# Patient Record
Sex: FEMALE | Race: WHITE | NOT HISPANIC OR LATINO | Employment: OTHER | URBAN - METROPOLITAN AREA
[De-identification: names, ages, dates, MRNs, and addresses within clinical notes are randomized per-mention and may not be internally consistent; named-entity substitution may affect disease eponyms.]

---

## 2017-03-06 ENCOUNTER — ALLSCRIPTS OFFICE VISIT (OUTPATIENT)
Dept: OTHER | Facility: OTHER | Age: 22
End: 2017-03-06

## 2017-06-15 ENCOUNTER — GENERIC CONVERSION - ENCOUNTER (OUTPATIENT)
Dept: OTHER | Facility: OTHER | Age: 22
End: 2017-06-15

## 2018-01-12 VITALS
DIASTOLIC BLOOD PRESSURE: 62 MMHG | WEIGHT: 195.38 LBS | HEIGHT: 66 IN | SYSTOLIC BLOOD PRESSURE: 118 MMHG | BODY MASS INDEX: 31.4 KG/M2

## 2018-01-12 NOTE — MISCELLANEOUS
Message   Recorded as Task   Date: 06/15/2017 02:29 PM, Created By: Max Shah   Task Name: Follow Up   Assigned To: Lynda Carosn   Regarding Patient: Joby Boyce, Status: Active   Comment:    Paul Barron - 15 Isaac 2017 2:29 PM     TASK CREATED  GOOD EVENING      PT CALLED ERQUESTING A REFILL FOR HER BIRTH CONTROL  HER CONTACT INFO: [facility] - 15 Isaac 2017 2:39 PM     TASK EDITED  pt should have refills    called and left a message for the pharmacy           Active Problems    1  Counseling for birth control, oral contraceptives (V25 01) (Z30 09)   2  Encounter for gynecological examination without abnormal finding (V72 31) (Z01 419)   3  Encounter for screening for respiratory tuberculosis (V74 1) (Z11 1)   4  Mild intermittent asthma without complication (275 27) (Y24 70)    Current Meds   1  Levora 0 15/30 (28) 0 15-30 MG-MCG Oral Tablet; Take one daily; Therapy: 74QNJ1220 to (Last Rx:06Mar2017)  Requested for: 33BQU2536 Ordered   2  Meloxicam 15 MG Oral Tablet; take 1 tablet by mouth once daily; Therapy: 40BSY6075 to (Evaluate:01Jan2017)  Requested for: 01ISY0076; Last   Rx:02Nov2016 Ordered   3  Methylphenidate HCl - 20 MG Oral Tablet; TAKE 1 TABLET DAILY; Therapy: 50LLY2974 to (Evaluate:05Jky4141); Last Rx:02Nov2016 Ordered   4  ProAir  (90 Base) MCG/ACT Inhalation Aerosol Solution; INHALE 2 PUFFS 4   times daily; Therapy: 17Jjr3137 to (Last Rx:39Wka4765)  Requested for: 77Ijf9992 Ordered    Allergies    1   No Known Environmental Allergies    Signatures   Electronically signed by : Yadi Castle, ; Isaac 15 2017  2:39PM EST                       (Author)

## 2018-01-15 NOTE — PROGRESS NOTES
Assessment    1  Well woman exam with routine gynecological exam (V72 31) (Z01 419)   2  Counseling for birth control, oral contraceptives (V25 01) (Z30 9)    Plan  Counseling for birth control, oral contraceptives    · Levora 0 15/30 (28) 0 15-30 MG-MCG Oral Tablet; TAKE 1 TABLET DAILY   Rx By: Steve Aguilar; Dispense: 0 Days ; #:1 Tablet; Refill: 3; For: Counseling for birth control, oral contraceptives; ALTHEA = N; Sent To: Elodia Norris    Discussion/Summary    Contraception - explained ocs and nexplanon, risk of blood clot with ocs reviewed along with side effects of nausea, breast tenderness, BTB, HA and bloating, irregular bleeding with nexplanon reviewed  She will start with her next menses and RTO 3months for pill check, continued condom use recommended, instructed on pill taking, information on pills and nexplanon given  Chief Complaint    1  Visit For: Preventive General Multisystem Exam  No issues      History of Present Illness  HPI: New Pt - 26 year old female presents for first exam, she has regular menses with 2-3 heavy days when she changes a super tampon 4-6 times a day  Cramps are manageable  She is sexually active and is very consistent with condom use  She is interested in contraception, either pills or Nexplanon  She has well controlled asthma and ADD  GYN HM, Adult Female [facility]: The patient is being seen for a health maintenance evaluation  General Health: The patient's health since the last visit is described as good  Lifestyle:  She has weight concerns  She does not use tobacco    Screening: Active Problems    1  Asthma (493 90) (J45 909)   2  Attention and concentration deficit (799 51) (R41 840)   3  Need for influenza vaccination (V04 81) (Z23)   4  Sacroiliitis (720 2) (M46 1)   5   URI (upper respiratory infection) (465 9) (J06 9)    Past Medical History    · History of status asthmaticus (V12 69) (Z87 09)    The active problems and past medical history were reviewed and updated today  Surgical History    · History of Adenoidectomy   · History of Oral Surgery Tooth Extraction   · History of Tonsillectomy    Family History    · Family history of Anxiety    · Family history of malignant neoplasm of breast (V16 3) (Z80 3)   · Family history of thyroid disease (V18 19) (Z83 49)   · Family history of Ovarian cancer    · Family history of Anxiety   · Family history of malignant neoplasm of breast (V16 3) (Z80 3)   · Family history of thyroid disease (V18 19) (Z83 49)   · Family history of Throat cancer    · Family history of Anxiety   · Family history of malignant neoplasm of breast (V16 3) (Z80 3)   · Family history of thyroid disease (V18 19) (Z83 49)   · Family history of Ovarian cancer   · Family history of Throat cancer    Social History    · Always uses seat belt   · Caffeine use (V49 89) (F15 90)   · Condom use   · Never a smoker   · No alcohol use   · Single    Current Meds   1  Methylphenidate HCl - 20 MG Oral Tablet; TAKE 1 TABLET DAILY; Therapy: 36LXI6543 to (Evaluate:04Ipq8229); Last Rx:19Nov2015 Ordered   2  ProAir  (90 Base) MCG/ACT Inhalation Aerosol Solution; INHALE 2 PUFFS 4   times daily; Therapy: 76Pjm1284 to (Last Rx:45Ktr7059)  Requested for: 25Srt4958 Ordered    Allergies    1  No Known Environmental Allergies    Vitals   Recorded: 95MET3566 39:97VM   Systolic 040   Diastolic 70   Height 5 ft 6 5 in   Weight 196 lb    BMI Calculated 31 16   BSA Calculated 1 99   LMP 29-Dec-2015     Physical Exam    Constitutional   General appearance: No acute distress, well appearing and well nourished  Neck   Thyroid: Normal, no thyromegaly  Pulmonary   Auscultation of lungs: Clear to auscultation  Cardiovascular   Auscultation of heart: Normal rate and rhythm, normal S1 and S2, no murmurs  Genitourinary   External genitalia: Normal and no lesions appreciated  Vagina: Normal, no lesions or dryness appreciated      Urethra: Normal     Urethral meatus: Normal     Bladder: Normal, soft, non-tender and no prolapse or masses appreciated  Cervix: Normal, no palpable masses  Uterus: Normal, non-tender, not enlarged, and no palpable masses  Adnexa/parametria: Normal, non-tender and no fullness or masses appreciated  Chest   Breasts: Normal and no dimpling or skin changes noted  Abdomen   Abdomen: Normal, non-tender, and no organomegaly noted  Liver and spleen: No hepatomegaly or splenomegaly  Examination for hernias: No hernias appreciated  Psychiatric   Orientation to person, place, and time: Normal     Mood and affect: Normal        Signatures   Electronically signed by :  Jean Serrano DO; Jan 19 2016  3:21PM EST                       (Author)

## 2018-01-16 NOTE — RESULT NOTES
Verified Results  * XR SPINE THORACIC 3 VIEW [id number] 01:49PM [facility] Order Number: [id number]     Test Name Result Flag Reference   XR SPINE THORACIC 3 VW (Report)     THORACIC SPINE     INDICATION: Right side back pain  COMPARISON: None     VIEWS: AP, lateral and coned-down projections; 3 images     FINDINGS: Bone mineralization within normal limits  Thoracic vertebrae demonstrate normal stature and alignment  There is no fracture or pathologic bone lesion  There is no displacement of the paraspinal line  The pedicles are intact  IMPRESSION:   Unremarkable thoracic spine  Workstation performed: [id number]     Signed by:    John Minaya DO   11/3/16

## 2018-03-16 ENCOUNTER — OFFICE VISIT (OUTPATIENT)
Dept: FAMILY MEDICINE CLINIC | Facility: HOSPITAL | Age: 23
End: 2018-03-16
Payer: COMMERCIAL

## 2018-03-16 VITALS
SYSTOLIC BLOOD PRESSURE: 100 MMHG | WEIGHT: 186 LBS | OXYGEN SATURATION: 99 % | HEIGHT: 67 IN | DIASTOLIC BLOOD PRESSURE: 64 MMHG | HEART RATE: 67 BPM | BODY MASS INDEX: 29.19 KG/M2

## 2018-03-16 DIAGNOSIS — F98.8 ATTENTION DEFICIT DISORDER (ADD) WITHOUT HYPERACTIVITY: Chronic | ICD-10-CM

## 2018-03-16 DIAGNOSIS — Z00.00 WELLNESS EXAMINATION: Primary | ICD-10-CM

## 2018-03-16 DIAGNOSIS — J45.20 MILD INTERMITTENT ASTHMA WITHOUT COMPLICATION: ICD-10-CM

## 2018-03-16 PROBLEM — S39.012A LUMBAR STRAIN: Status: ACTIVE | Noted: 2018-03-16

## 2018-03-16 PROCEDURE — 99395 PREV VISIT EST AGE 18-39: CPT | Performed by: INTERNAL MEDICINE

## 2018-03-16 RX ORDER — LEVONORGESTREL AND ETHINYL ESTRADIOL 0.15-0.03
KIT ORAL
COMMUNITY
Start: 2018-01-05 | End: 2018-04-05 | Stop reason: CLARIF

## 2018-03-16 RX ORDER — MELOXICAM 15 MG/1
15 TABLET ORAL DAILY
Qty: 30 TABLET | Refills: 2 | Status: SHIPPED | OUTPATIENT
Start: 2018-03-16 | End: 2019-11-29 | Stop reason: ALTCHOICE

## 2018-03-16 RX ORDER — MELOXICAM 15 MG/1
1 TABLET ORAL DAILY
COMMUNITY
Start: 2016-11-02 | End: 2018-03-16 | Stop reason: SDUPTHER

## 2018-03-16 RX ORDER — METHYLPHENIDATE HYDROCHLORIDE 20 MG/1
20 TABLET ORAL DAILY
Qty: 30 TABLET | Refills: 0 | Status: SHIPPED | OUTPATIENT
Start: 2018-03-16 | End: 2018-05-22 | Stop reason: SDUPTHER

## 2018-03-16 RX ORDER — LEVONORGESTREL AND ETHINYL ESTRADIOL 0.15-0.03
KIT ORAL DAILY
COMMUNITY
Start: 2016-01-19 | End: 2018-05-22 | Stop reason: SDUPTHER

## 2018-03-16 RX ORDER — ALBUTEROL SULFATE 90 UG/1
2 AEROSOL, METERED RESPIRATORY (INHALATION) 4 TIMES DAILY
COMMUNITY
Start: 2015-08-06 | End: 2018-03-16 | Stop reason: ALTCHOICE

## 2018-03-16 RX ORDER — LEVALBUTEROL TARTRATE 45 UG/1
2 AEROSOL, METERED ORAL EVERY 6 HOURS PRN
Qty: 1 INHALER | Refills: 0 | Status: SHIPPED | OUTPATIENT
Start: 2018-03-16 | End: 2018-08-08 | Stop reason: SDUPTHER

## 2018-03-16 RX ORDER — METHYLPHENIDATE HYDROCHLORIDE 20 MG/1
1 TABLET ORAL DAILY
COMMUNITY
Start: 2015-10-06 | End: 2018-03-16 | Stop reason: SDUPTHER

## 2018-03-16 NOTE — PROGRESS NOTES
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          Relevant Medications    methylphenidate (RITALIN) 20 MG tablet      Other Visit Diagnoses     Wellness examination    -  Primary    Relevant Medications    meloxicam (MOBIC) 15 mg tablet            Subjective:      Patient ID: Kale Rich is a 23 y o  female    1  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   sees gyn next month  2  Asthma- will change to xopenex        The following portions of the patient's history were reviewed and updated as appropriate: allergies, current medications and problem list      Review of Systems   Musculoskeletal:        Occasional low back pain   All other systems reviewed and are negative  Objective:      Current Outpatient Prescriptions:     levonorgestrel-ethinyl estradiol (LEVORA 0 15/30, 28,) 0 15-30 MG-MCG per tablet, Take by mouth daily, Disp: , Rfl:     meloxicam (MOBIC) 15 mg tablet, Take 1 tablet (15 mg total) by mouth daily, Disp: 30 tablet, Rfl: 2    methylphenidate (RITALIN) 20 MG tablet, Take 1 tablet (20 mg total) by mouth daily Max Daily Amount: 20 mg, Disp: 30 tablet, Rfl: 0    CRISTIANA-28 0 15-30 MG-MCG per tablet, , Disp: , Rfl:     levalbuterol (XOPENEX HFA) 45 mcg/act inhaler, Inhale 2 puffs every 6 (six) hours as needed for wheezing Intolerant to albuterol, Disp: 1 Inhaler, Rfl: 0    /72   Pulse 90   Resp 16   Ht 5' 3" (1 6 m)   Wt 65 3 kg (144 lb)   BMI 25 51 kg/m²          Physical Exam   Constitutional: She is oriented to person, place, and time   She appears well-developed and well-nourished  No distress  HENT:   Right Ear: External ear normal    Left Ear: External ear normal    Mouth/Throat: Oropharynx is clear and moist    Tm clear   Eyes: Conjunctivae are normal  Right eye exhibits no discharge  Left eye exhibits no discharge  No scleral icterus  Neck: Neck supple  No thyromegaly present  Cardiovascular: Normal rate and regular rhythm  No murmur heard  Pulmonary/Chest: Breath sounds normal  No respiratory distress  She has no wheezes  She has no rales  Abdominal: Soft  She exhibits no distension  There is no tenderness  Musculoskeletal: She exhibits no edema or tenderness  Lymphadenopathy:     She has no cervical adenopathy  Neurological: She is alert and oriented to person, place, and time  No cranial nerve deficit  She exhibits normal muscle tone  Skin: Skin is warm and dry  No rash noted  Psychiatric: She has a normal mood and affect  Her behavior is normal  Judgment and thought content normal    Nursing note and vitals reviewed

## 2018-03-16 NOTE — ASSESSMENT & PLAN NOTE
This winter has noted some burning in chest from inhaler and feeling jittery- will try xopenex instead

## 2018-04-05 ENCOUNTER — OFFICE VISIT (OUTPATIENT)
Dept: FAMILY MEDICINE CLINIC | Facility: HOSPITAL | Age: 23
End: 2018-04-05
Payer: COMMERCIAL

## 2018-04-05 VITALS
HEIGHT: 67 IN | DIASTOLIC BLOOD PRESSURE: 70 MMHG | WEIGHT: 192.5 LBS | SYSTOLIC BLOOD PRESSURE: 110 MMHG | BODY MASS INDEX: 30.21 KG/M2

## 2018-04-05 DIAGNOSIS — N76.0 ACUTE VAGINITIS: ICD-10-CM

## 2018-04-05 PROCEDURE — 99212 OFFICE O/P EST SF 10 MIN: CPT | Performed by: INTERNAL MEDICINE

## 2018-04-05 RX ORDER — METRONIDAZOLE 7.5 MG/G
1 GEL VAGINAL 2 TIMES DAILY
Qty: 70 G | Refills: 0 | Status: SHIPPED | OUTPATIENT
Start: 2018-04-05 | End: 2018-04-10

## 2018-04-05 RX ORDER — FLUCONAZOLE 150 MG/1
150 TABLET ORAL ONCE
Qty: 1 TABLET | Refills: 0 | Status: SHIPPED | OUTPATIENT
Start: 2018-04-05 | End: 2018-04-05

## 2018-04-05 NOTE — PROGRESS NOTES
Assessment/Plan:    No problem-specific Assessment & Plan notes found for this encounter  Problem List Items Addressed This Visit     None      Visit Diagnoses     Acute vaginitis    -  Primary            Subjective:      Patient ID: Alberta Fernando is a 23 y o  female    Started with green discharge about a week ago and then had pain with intercourse  Has some burning in area with voiding  Used some otc Hc cream for itching  This is different than prior yeast infections- has had mtnz in past        The following portions of the patient's history were reviewed and updated as appropriate: allergies, current medications and problem list      Review of Systems   Constitutional: Negative for chills and fever  Genitourinary: Positive for pelvic pain  Objective:      Current Outpatient Prescriptions:     levalbuterol (XOPENEX HFA) 45 mcg/act inhaler, Inhale 2 puffs every 6 (six) hours as needed for wheezing Intolerant to albuterol, Disp: 1 Inhaler, Rfl: 0    levonorgestrel-ethinyl estradiol (LEVORA 0 15/30, 28,) 0 15-30 MG-MCG per tablet, Take by mouth daily, Disp: , Rfl:     meloxicam (MOBIC) 15 mg tablet, Take 1 tablet (15 mg total) by mouth daily, Disp: 30 tablet, Rfl: 2    methylphenidate (RITALIN) 20 MG tablet, Take 1 tablet (20 mg total) by mouth daily Max Daily Amount: 20 mg, Disp: 30 tablet, Rfl: 0    /72   Pulse 90   Resp 16   Ht 5' 3" (1 6 m)   Wt 65 3 kg (144 lb)   BMI 25 51 kg/m²          Physical Exam   Constitutional: She appears well-developed and well-nourished  No distress  Abdominal: Soft  She exhibits no distension and no mass  There is no tenderness  There is no guarding  Nursing note and vitals reviewed

## 2018-05-22 ENCOUNTER — ANNUAL EXAM (OUTPATIENT)
Dept: OBGYN CLINIC | Facility: CLINIC | Age: 23
End: 2018-05-22
Payer: COMMERCIAL

## 2018-05-22 VITALS
BODY MASS INDEX: 30.13 KG/M2 | SYSTOLIC BLOOD PRESSURE: 118 MMHG | HEIGHT: 67 IN | DIASTOLIC BLOOD PRESSURE: 70 MMHG | WEIGHT: 192 LBS

## 2018-05-22 DIAGNOSIS — Z01.419 ENCOUNTER FOR ANNUAL ROUTINE GYNECOLOGICAL EXAMINATION: Primary | ICD-10-CM

## 2018-05-22 DIAGNOSIS — Z30.41 ENCOUNTER FOR SURVEILLANCE OF CONTRACEPTIVE PILLS: ICD-10-CM

## 2018-05-22 DIAGNOSIS — F98.8 ATTENTION DEFICIT DISORDER (ADD) WITHOUT HYPERACTIVITY: Chronic | ICD-10-CM

## 2018-05-22 DIAGNOSIS — Z30.09 ENCOUNTER FOR COUNSELING REGARDING INITIATION OF OTHER CONTRACEPTIVE MEASURE: ICD-10-CM

## 2018-05-22 DIAGNOSIS — Z11.3 SCREENING FOR STD (SEXUALLY TRANSMITTED DISEASE): ICD-10-CM

## 2018-05-22 PROCEDURE — 99395 PREV VISIT EST AGE 18-39: CPT | Performed by: OBSTETRICS & GYNECOLOGY

## 2018-05-22 PROCEDURE — 87591 N.GONORRHOEAE DNA AMP PROB: CPT | Performed by: OBSTETRICS & GYNECOLOGY

## 2018-05-22 PROCEDURE — 87491 CHLMYD TRACH DNA AMP PROBE: CPT | Performed by: OBSTETRICS & GYNECOLOGY

## 2018-05-22 RX ORDER — METHYLPHENIDATE HYDROCHLORIDE 20 MG/1
20 TABLET ORAL DAILY
Qty: 30 TABLET | Refills: 0 | Status: SHIPPED | OUTPATIENT
Start: 2018-05-22 | End: 2018-08-08 | Stop reason: SDUPTHER

## 2018-05-22 RX ORDER — LEVONORGESTREL AND ETHINYL ESTRADIOL 0.15-0.03
1 KIT ORAL DAILY
Qty: 84 TABLET | Refills: 4 | Status: SHIPPED | OUTPATIENT
Start: 2018-05-22 | End: 2019-06-25 | Stop reason: SDUPTHER

## 2018-05-22 NOTE — PROGRESS NOTES
Assessment/Plan:    Contraception-we discussed various options for her including the contraceptive patch  We discussed the ring although this could cause her to have more discharge  Depo-Provera was reviewed along with Nexplanon, Mirena and ParaGard  I discussed the importance of consistent condom use with any of these options  We discussed the small risk of severe infection and ectopic pregnancy with an [employer] Units D  The chance of increased dysmenorrhea  We reviewed bleeding issues with any of the progesterone only options  She was given information on all of the above and if she chooses 1 of these, she will call  For now, her Everett Diggs was renewed  Chlamydia and gonorrhea done  No problem-specific Assessment & Plan notes found for this encounter  Diagnoses and all orders for this visit:    Encounter for annual routine gynecological examination    Encounter for surveillance of contraceptive pills  -     levonorgestrel-ethinyl estradiol (LEVORA 0 15/30, 28,) 0 15-30 MG-MCG per tablet; Take 1 tablet by mouth daily    Screening for STD (sexually transmitted disease)  -     Chlamydia/GC amplified DNA by PCR          Subjective:      Patient ID: Luis A Resendiz is a 23 y o  female  Patient here for yearly, she has been on Everett Diggs and does well with it but is interested in birth control that she does not have to take every day as she is going away to college in the fall  She thinks she would like an IUD  She uses condoms regularly  She feels the pill causes her to have more clear discharge  The following portions of the patient's history were reviewed and updated as appropriate: allergies, current medications, past family history, past medical history, past social history, past surgical history and problem list     Review of Systems   Constitutional: Negative  HENT: Negative  Eyes: Negative  Respiratory: Negative  Cardiovascular: Negative  Gastrointestinal: Negative  Endocrine: Negative  Genitourinary: Negative  Musculoskeletal: Negative  Skin: Negative  Allergic/Immunologic: Negative  Neurological: Negative  Hematological: Negative  Psychiatric/Behavioral: Negative  Objective:      /70 (BP Location: Right arm, Patient Position: Sitting, Cuff Size: Standard)   Ht 5' 6 5" (1 689 m)   Wt 87 1 kg (192 lb)   LMP 05/16/2018 (Exact Date)   BMI 30 53 kg/m²          Physical Exam   Constitutional: She appears well-developed  Neck: No tracheal deviation present  No thyromegaly present  Cardiovascular: Normal rate and regular rhythm  Pulmonary/Chest: Effort normal and breath sounds normal  Right breast exhibits no inverted nipple, no mass, no nipple discharge, no skin change and no tenderness  Left breast exhibits no inverted nipple, no mass, no nipple discharge, no skin change and no tenderness  Breasts are symmetrical    Examined seated and supine   Abdominal: Soft  She exhibits no distension and no mass  There is no tenderness  Genitourinary: Vagina normal and uterus normal  No labial fusion  There is no rash, tenderness, lesion or injury on the right labia  There is no rash, tenderness, lesion or injury on the left labia  Cervix exhibits no motion tenderness, no discharge and no friability  Right adnexum displays no mass, no tenderness and no fullness  Left adnexum displays no mass, no tenderness and no fullness  Vitals reviewed

## 2018-05-23 LAB
CHLAMYDIA DNA CVX QL NAA+PROBE: NORMAL
N GONORRHOEA DNA GENITAL QL NAA+PROBE: NORMAL

## 2018-08-08 ENCOUNTER — OFFICE VISIT (OUTPATIENT)
Dept: FAMILY MEDICINE CLINIC | Facility: HOSPITAL | Age: 23
End: 2018-08-08
Payer: COMMERCIAL

## 2018-08-08 ENCOUNTER — CLINICAL SUPPORT (OUTPATIENT)
Dept: FAMILY MEDICINE CLINIC | Facility: HOSPITAL | Age: 23
End: 2018-08-08
Payer: COMMERCIAL

## 2018-08-08 VITALS
OXYGEN SATURATION: 98 % | DIASTOLIC BLOOD PRESSURE: 68 MMHG | BODY MASS INDEX: 37.76 KG/M2 | WEIGHT: 200 LBS | SYSTOLIC BLOOD PRESSURE: 96 MMHG | HEIGHT: 61 IN | HEART RATE: 80 BPM

## 2018-08-08 DIAGNOSIS — Z11.1 PPD SCREENING TEST: Primary | ICD-10-CM

## 2018-08-08 DIAGNOSIS — F98.8 ATTENTION DEFICIT DISORDER (ADD) WITHOUT HYPERACTIVITY: Primary | Chronic | ICD-10-CM

## 2018-08-08 DIAGNOSIS — J45.20 MILD INTERMITTENT ASTHMA WITHOUT COMPLICATION: ICD-10-CM

## 2018-08-08 PROBLEM — S39.012A LUMBAR STRAIN: Status: RESOLVED | Noted: 2018-03-16 | Resolved: 2018-08-08

## 2018-08-08 PROCEDURE — 99213 OFFICE O/P EST LOW 20 MIN: CPT | Performed by: INTERNAL MEDICINE

## 2018-08-08 PROCEDURE — 86580 TB INTRADERMAL TEST: CPT | Performed by: INTERNAL MEDICINE

## 2018-08-08 RX ORDER — LEVALBUTEROL TARTRATE 45 UG/1
2 AEROSOL, METERED ORAL EVERY 6 HOURS PRN
Qty: 1 INHALER | Refills: 5 | Status: SHIPPED | OUTPATIENT
Start: 2018-08-08 | End: 2019-05-15 | Stop reason: SDUPTHER

## 2018-08-08 RX ORDER — METHYLPHENIDATE HYDROCHLORIDE 20 MG/1
20 TABLET ORAL DAILY
Qty: 30 TABLET | Refills: 0 | Status: SHIPPED | OUTPATIENT
Start: 2018-08-08 | End: 2018-10-22 | Stop reason: SDUPTHER

## 2018-08-08 NOTE — ASSESSMENT & PLAN NOTE
No recent flare with heat and humidity- when active with school and walking up and down the hill to her house    had tachycardia with albuterol so on prn xopenex   If having increased symptoms will place on steroid inhaler

## 2018-08-08 NOTE — PROGRESS NOTES
Assessment/Plan:             Problem List Items Addressed This Visit        Respiratory    Mild intermittent asthma without complication     No recent flare with heat and humidity- when active with school and walking up and down the hill to her house             Other    ADD (attention deficit disorder) - Primary (Chronic)     Here for 6 month recheck regarding use of meds- ritalin 20 mg is holding her well for classes  Takes about an hour before her first class in am and does well with that- took one class this summer for math and used it on those days  Will be restarting classes end of month  No sleep issues even with a night class this summer  Relevant Medications    methylphenidate (RITALIN) 20 MG tablet            Subjective:      Patient ID: Kristina Reno is a 23 y o  female    1  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 and  Has 5 classes this semester  2   Screening-had recent pap- n o issues with current oc meds- no headaches or dizziness        The following portions of the patient's history were reviewed and updated as appropriate: allergies, current medications and problem list      Review of Systems   Musculoskeletal: Negative for back pain  Uses prn mobic   All other systems reviewed and are negative          Objective:      Current Outpatient Prescriptions:     levalbuterol (XOPENEX HFA) 45 mcg/act inhaler, Inhale 2 puffs every 6 (six) hours as needed for wheezing Intolerant to albuterol, Disp: 1 Inhaler, Rfl: 0    levonorgestrel-ethinyl estradiol (LEVORA 0 15/30, 28,) 0 15-30 MG-MCG per tablet, Take 1 tablet by mouth daily, Disp: 84 tablet, Rfl: 4    meloxicam (MOBIC) 15 mg tablet, Take 1 tablet (15 mg total) by mouth daily, Disp: 30 tablet, Rfl: 2    methylphenidate (RITALIN) 20 MG tablet, Take 1 tablet (20 mg total) by mouth daily Max Daily Amount: 20 mg, Disp: 30 tablet, Rfl: 0    Blood pressure 96/68, pulse 80, height 5' 0 65" (1 541 m), weight 90 7 kg (200 lb), last menstrual period 07/18/2018, SpO2 98 %  Physical Exam   Constitutional: She is oriented to person, place, and time  She appears well-developed and well-nourished  No distress  HENT:   Right Ear: External ear normal    Left Ear: External ear normal    Mouth/Throat: Oropharynx is clear and moist    Eyes: Conjunctivae are normal  Right eye exhibits no discharge  Left eye exhibits no discharge  No scleral icterus  Neck: Neck supple  No thyromegaly present  Cardiovascular: Normal rate and regular rhythm  No murmur heard  Pulmonary/Chest: Breath sounds normal  No respiratory distress  She has no wheezes  She has no rales  Abdominal: Soft  She exhibits no distension  There is no tenderness  Musculoskeletal: She exhibits no edema or tenderness  Lymphadenopathy:     She has no cervical adenopathy  Neurological: She is alert and oriented to person, place, and time  No cranial nerve deficit  She exhibits normal muscle tone  Skin: Skin is warm and dry  No rash noted  Psychiatric: She has a normal mood and affect  Her behavior is normal  Judgment and thought content normal    Nursing note and vitals reviewed

## 2018-08-08 NOTE — ASSESSMENT & PLAN NOTE
Here for 6 month recheck regarding use of meds- ritalin 20 mg is holding her well for classes  Takes about an hour before her first class in am and does well with that- took one class this summer for math and used it on those days  Will be restarting classes end of month  No sleep issues even with a night class this summer

## 2018-08-10 ENCOUNTER — CLINICAL SUPPORT (OUTPATIENT)
Dept: FAMILY MEDICINE CLINIC | Facility: HOSPITAL | Age: 23
End: 2018-08-10

## 2018-08-10 DIAGNOSIS — Z11.1 ENCOUNTER FOR PPD SKIN TEST READING: ICD-10-CM

## 2018-08-10 LAB
INDURATION: 0 MM
TB SKIN TEST: NEGATIVE

## 2018-08-14 ENCOUNTER — TELEPHONE (OUTPATIENT)
Dept: FAMILY MEDICINE CLINIC | Facility: HOSPITAL | Age: 23
End: 2018-08-14

## 2018-08-20 ENCOUNTER — OFFICE VISIT (OUTPATIENT)
Dept: FAMILY MEDICINE CLINIC | Facility: HOSPITAL | Age: 23
End: 2018-08-20
Payer: COMMERCIAL

## 2018-08-20 VITALS
TEMPERATURE: 98.5 F | SYSTOLIC BLOOD PRESSURE: 100 MMHG | HEART RATE: 82 BPM | BODY MASS INDEX: 37.76 KG/M2 | DIASTOLIC BLOOD PRESSURE: 64 MMHG | OXYGEN SATURATION: 99 % | WEIGHT: 200 LBS | HEIGHT: 61 IN

## 2018-08-20 DIAGNOSIS — J02.9 PHARYNGITIS, UNSPECIFIED ETIOLOGY: Primary | ICD-10-CM

## 2018-08-20 PROCEDURE — 1036F TOBACCO NON-USER: CPT | Performed by: PHYSICIAN ASSISTANT

## 2018-08-20 PROCEDURE — 3008F BODY MASS INDEX DOCD: CPT | Performed by: PHYSICIAN ASSISTANT

## 2018-08-20 PROCEDURE — 99213 OFFICE O/P EST LOW 20 MIN: CPT | Performed by: PHYSICIAN ASSISTANT

## 2018-08-20 RX ORDER — AZITHROMYCIN 250 MG/1
250 TABLET, FILM COATED ORAL EVERY 24 HOURS
Qty: 6 TABLET | Refills: 0 | Status: SHIPPED | OUTPATIENT
Start: 2018-08-20 | End: 2018-08-25

## 2018-08-20 NOTE — PATIENT INSTRUCTIONS
Informed patient suspect viral etiology and no need for antibiotics at this time, given Rx  In case sx  Worsen or progress     Increase water intake, and get plenty of rest

## 2018-08-20 NOTE — PROGRESS NOTES
Assessment/Plan:         Diagnoses and all orders for this visit:    Pharyngitis, unspecified etiology  -     azithromycin (ZITHROMAX) 250 mg tablet; Take 1 tablet (250 mg total) by mouth every 24 hours for 5 days Take 2 tabs  Day one then one tab  Daily  Subjective:      Patient ID: Linette Fisher is a 23 y o  female  23year old white female presents with c/o sore throat since last week Wednesday, getting better; has  no ear pain, no runny nose, no headaches, no fatigue/sweats/chills  No meds  Taken for sx  Goes to college tomorrow, and mom concerned about patient (daughter)  Sore Throat    Pertinent negatives include no congestion, coughing, ear pain, headaches or shortness of breath  Review of Systems   Constitutional: Negative for chills, diaphoresis, fatigue and fever  HENT: Positive for sore throat  Negative for congestion, ear pain and rhinorrhea  Respiratory: Negative for cough and shortness of breath  Neurological: Negative for dizziness, light-headedness and headaches  Objective:      /64   Pulse 82   Temp 98 5 °F (36 9 °C) (Tympanic)   Ht 5' 0 65" (1 541 m)   Wt 90 7 kg (200 lb)   LMP 08/10/2018 (Exact Date)   SpO2 99%   BMI 38 23 kg/m²          Physical Exam   Constitutional: She appears well-developed and well-nourished  No distress  HENT:   Head: Normocephalic and atraumatic  Right Ear: External ear normal    Left Ear: External ear normal    Nose: Nose normal    Mouth/Throat: Oropharynx is clear and moist  No oropharyngeal exudate  Cardiovascular: Normal rate, regular rhythm and normal heart sounds  Pulmonary/Chest: Effort normal and breath sounds normal  No respiratory distress  She has no wheezes  She has no rales  Skin: She is not diaphoretic  Nursing note and vitals reviewed

## 2018-10-22 DIAGNOSIS — F98.8 ATTENTION DEFICIT DISORDER (ADD) WITHOUT HYPERACTIVITY: Chronic | ICD-10-CM

## 2018-10-23 RX ORDER — METHYLPHENIDATE HYDROCHLORIDE 20 MG/1
20 TABLET ORAL DAILY
Qty: 30 TABLET | Refills: 0 | Status: SHIPPED | OUTPATIENT
Start: 2018-10-23 | End: 2019-02-05 | Stop reason: SDUPTHER

## 2018-12-31 PROCEDURE — 88305 TISSUE EXAM BY PATHOLOGIST: CPT | Performed by: PATHOLOGY

## 2019-01-08 ENCOUNTER — LAB REQUISITION (OUTPATIENT)
Dept: LAB | Facility: HOSPITAL | Age: 24
End: 2019-01-08
Payer: COMMERCIAL

## 2019-01-08 DIAGNOSIS — D22.4 MELANOCYTIC NEVI OF SCALP AND NECK: ICD-10-CM

## 2019-02-05 DIAGNOSIS — F98.8 ATTENTION DEFICIT DISORDER (ADD) WITHOUT HYPERACTIVITY: Chronic | ICD-10-CM

## 2019-02-06 DIAGNOSIS — F98.8 ATTENTION DEFICIT DISORDER (ADD) WITHOUT HYPERACTIVITY: Chronic | ICD-10-CM

## 2019-02-06 RX ORDER — METHYLPHENIDATE HYDROCHLORIDE 20 MG/1
20 TABLET ORAL DAILY
Qty: 30 TABLET | Refills: 0 | Status: SHIPPED | OUTPATIENT
Start: 2019-02-06 | End: 2019-08-06 | Stop reason: SDUPTHER

## 2019-02-06 RX ORDER — METHYLPHENIDATE HYDROCHLORIDE 20 MG/1
20 TABLET ORAL DAILY
Qty: 30 TABLET | Refills: 0 | Status: SHIPPED | OUTPATIENT
Start: 2019-02-06 | End: 2019-02-06 | Stop reason: SDUPTHER

## 2019-03-12 ENCOUNTER — OFFICE VISIT (OUTPATIENT)
Dept: FAMILY MEDICINE CLINIC | Facility: HOSPITAL | Age: 24
End: 2019-03-12
Payer: COMMERCIAL

## 2019-03-12 VITALS
BODY MASS INDEX: 35.84 KG/M2 | HEART RATE: 84 BPM | DIASTOLIC BLOOD PRESSURE: 64 MMHG | SYSTOLIC BLOOD PRESSURE: 100 MMHG | HEIGHT: 66 IN | RESPIRATION RATE: 16 BRPM | WEIGHT: 223 LBS

## 2019-03-12 DIAGNOSIS — Z00.00 HEALTHCARE MAINTENANCE: Primary | ICD-10-CM

## 2019-03-12 PROCEDURE — 99395 PREV VISIT EST AGE 18-39: CPT | Performed by: PHYSICIAN ASSISTANT

## 2019-03-12 NOTE — PROGRESS NOTES
Assessment/Plan:    Health maintenance  Obesity  Allergic rhinitis       Subjective:      Patient ID: Mars Osman is a 26 y o  female  26year old white female presents for physical, also concerned about left ear pain  Saw PA in college, and was given ear drops, which did not help  Denies feeling congested but has sore throat  LNMP-  2/23/19, regular  Has GYN for well woman exams  Occasional back pain, right side, intermittent, non-radiating  Studying early childhood education  Review of Systems   Constitutional: Negative for chills, diaphoresis, fatigue and fever  HENT: Positive for ear pain and sore throat  Negative for congestion and rhinorrhea  Eyes: Negative for pain, discharge and visual disturbance  Respiratory: Negative for cough and shortness of breath  Cardiovascular: Negative for chest pain, palpitations and leg swelling  Gastrointestinal: Negative for abdominal pain, constipation, diarrhea, nausea and vomiting  Genitourinary: Negative for menstrual problem, vaginal bleeding and vaginal discharge  Musculoskeletal: Negative for arthralgias, back pain, myalgias, neck pain and neck stiffness  Neurological: Negative for dizziness, light-headedness and headaches  Psychiatric/Behavioral: Positive for decreased concentration and sleep disturbance  Negative for self-injury and suicidal ideas  The patient is not nervous/anxious  Objective:      /64   Pulse 84   Resp 16   Ht 5' 6 05" (1 678 m)   Wt 101 kg (223 lb)   BMI 35 94 kg/m²          Physical Exam   Constitutional: She is oriented to person, place, and time  She appears well-developed and well-nourished  No distress  Overweight  HENT:   Head: Normocephalic and atraumatic  Right Ear: External ear normal    Left Ear: External ear normal    Nose: Nose normal    Mouth/Throat: Oropharynx is clear and moist  No oropharyngeal exudate  Congestion noted in middle ears      Eyes: EOM are normal  Right eye exhibits no discharge  Left eye exhibits no discharge  No scleral icterus  Cardiovascular: Normal rate, regular rhythm, normal heart sounds and intact distal pulses  Pulmonary/Chest: Effort normal and breath sounds normal  No stridor  No respiratory distress  She has no wheezes  She has no rales  Abdominal: Soft  Bowel sounds are normal  She exhibits no distension and no mass  There is no tenderness  There is no rebound and no guarding  Musculoskeletal: Normal range of motion  She exhibits no edema, tenderness or deformity  Neurological: She is alert and oriented to person, place, and time  She displays normal reflexes  No cranial nerve deficit or sensory deficit  She exhibits normal muscle tone  Coordination normal    Skin: She is not diaphoretic  Psychiatric: She has a normal mood and affect  Her behavior is normal  Judgment and thought content normal    Nursing note and vitals reviewed

## 2019-05-15 ENCOUNTER — LAB (OUTPATIENT)
Dept: LAB | Facility: HOSPITAL | Age: 24
End: 2019-05-15
Attending: INTERNAL MEDICINE
Payer: COMMERCIAL

## 2019-05-15 ENCOUNTER — OFFICE VISIT (OUTPATIENT)
Dept: FAMILY MEDICINE CLINIC | Facility: HOSPITAL | Age: 24
End: 2019-05-15
Payer: COMMERCIAL

## 2019-05-15 VITALS
HEIGHT: 67 IN | WEIGHT: 226 LBS | BODY MASS INDEX: 35.47 KG/M2 | SYSTOLIC BLOOD PRESSURE: 118 MMHG | DIASTOLIC BLOOD PRESSURE: 82 MMHG | RESPIRATION RATE: 16 BRPM | HEART RATE: 72 BPM

## 2019-05-15 DIAGNOSIS — J45.20 MILD INTERMITTENT ASTHMA WITHOUT COMPLICATION: ICD-10-CM

## 2019-05-15 DIAGNOSIS — F41.0 PANIC DISORDER: Primary | ICD-10-CM

## 2019-05-15 DIAGNOSIS — F41.0 PANIC DISORDER: ICD-10-CM

## 2019-05-15 LAB
ALBUMIN SERPL BCP-MCNC: 3.8 G/DL (ref 3.5–5)
ALP SERPL-CCNC: 76 U/L (ref 46–116)
ALT SERPL W P-5'-P-CCNC: 36 U/L (ref 12–78)
ANION GAP SERPL CALCULATED.3IONS-SCNC: 10 MMOL/L (ref 4–13)
AST SERPL W P-5'-P-CCNC: 18 U/L (ref 5–45)
BILIRUB SERPL-MCNC: 0.6 MG/DL (ref 0.2–1)
BUN SERPL-MCNC: 10 MG/DL (ref 5–25)
CALCIUM SERPL-MCNC: 8.9 MG/DL (ref 8.3–10.1)
CHLORIDE SERPL-SCNC: 106 MMOL/L (ref 100–108)
CO2 SERPL-SCNC: 26 MMOL/L (ref 21–32)
CREAT SERPL-MCNC: 0.81 MG/DL (ref 0.6–1.3)
ERYTHROCYTE [DISTWIDTH] IN BLOOD BY AUTOMATED COUNT: 13 % (ref 11.6–15.1)
GFR SERPL CREATININE-BSD FRML MDRD: 104 ML/MIN/1.73SQ M
GLUCOSE P FAST SERPL-MCNC: 87 MG/DL (ref 65–99)
HCT VFR BLD AUTO: 44.3 % (ref 34.8–46.1)
HGB BLD-MCNC: 14.5 G/DL (ref 11.5–15.4)
MCH RBC QN AUTO: 29.8 PG (ref 26.8–34.3)
MCHC RBC AUTO-ENTMCNC: 32.7 G/DL (ref 31.4–37.4)
MCV RBC AUTO: 91 FL (ref 82–98)
PLATELET # BLD AUTO: 302 THOUSANDS/UL (ref 149–390)
PMV BLD AUTO: 9.7 FL (ref 8.9–12.7)
POTASSIUM SERPL-SCNC: 4 MMOL/L (ref 3.5–5.3)
PROT SERPL-MCNC: 7.4 G/DL (ref 6.4–8.2)
RBC # BLD AUTO: 4.87 MILLION/UL (ref 3.81–5.12)
SODIUM SERPL-SCNC: 142 MMOL/L (ref 136–145)
TSH SERPL DL<=0.05 MIU/L-ACNC: 1.85 UIU/ML (ref 0.36–3.74)
WBC # BLD AUTO: 7.9 THOUSAND/UL (ref 4.31–10.16)

## 2019-05-15 PROCEDURE — 84443 ASSAY THYROID STIM HORMONE: CPT

## 2019-05-15 PROCEDURE — 80053 COMPREHEN METABOLIC PANEL: CPT

## 2019-05-15 PROCEDURE — 85027 COMPLETE CBC AUTOMATED: CPT

## 2019-05-15 PROCEDURE — 36415 COLL VENOUS BLD VENIPUNCTURE: CPT

## 2019-05-15 PROCEDURE — 99214 OFFICE O/P EST MOD 30 MIN: CPT | Performed by: INTERNAL MEDICINE

## 2019-05-15 RX ORDER — LEVALBUTEROL TARTRATE 45 UG/1
2 AEROSOL, METERED ORAL EVERY 6 HOURS PRN
Qty: 1 INHALER | Refills: 5 | Status: SHIPPED | OUTPATIENT
Start: 2019-05-15 | End: 2021-03-01 | Stop reason: SDUPTHER

## 2019-05-15 RX ORDER — ALPRAZOLAM 0.25 MG/1
0.25 TABLET ORAL 2 TIMES DAILY PRN
Qty: 20 TABLET | Refills: 0 | Status: SHIPPED | OUTPATIENT
Start: 2019-05-15 | End: 2019-06-19 | Stop reason: SDUPTHER

## 2019-06-19 ENCOUNTER — OFFICE VISIT (OUTPATIENT)
Dept: FAMILY MEDICINE CLINIC | Facility: HOSPITAL | Age: 24
End: 2019-06-19
Payer: COMMERCIAL

## 2019-06-19 VITALS
DIASTOLIC BLOOD PRESSURE: 78 MMHG | OXYGEN SATURATION: 97 % | BODY MASS INDEX: 36.41 KG/M2 | WEIGHT: 232 LBS | HEIGHT: 67 IN | HEART RATE: 89 BPM | SYSTOLIC BLOOD PRESSURE: 118 MMHG

## 2019-06-19 DIAGNOSIS — F98.8 ATTENTION DEFICIT DISORDER (ADD) WITHOUT HYPERACTIVITY: Chronic | ICD-10-CM

## 2019-06-19 DIAGNOSIS — J45.20 MILD INTERMITTENT ASTHMA WITHOUT COMPLICATION: ICD-10-CM

## 2019-06-19 DIAGNOSIS — R63.5 WEIGHT GAIN: ICD-10-CM

## 2019-06-19 DIAGNOSIS — F41.0 PANIC DISORDER: Primary | ICD-10-CM

## 2019-06-19 PROBLEM — E66.812 CLASS 2 OBESITY IN ADULT: Status: ACTIVE | Noted: 2019-06-19

## 2019-06-19 PROBLEM — E66.9 CLASS 2 OBESITY IN ADULT: Status: ACTIVE | Noted: 2019-06-19

## 2019-06-19 PROCEDURE — 3008F BODY MASS INDEX DOCD: CPT | Performed by: INTERNAL MEDICINE

## 2019-06-19 PROCEDURE — 99214 OFFICE O/P EST MOD 30 MIN: CPT | Performed by: INTERNAL MEDICINE

## 2019-06-19 RX ORDER — ALPRAZOLAM 0.25 MG/1
0.25 TABLET ORAL 2 TIMES DAILY PRN
Qty: 20 TABLET | Refills: 0 | Status: SHIPPED | OUTPATIENT
Start: 2019-06-19 | End: 2019-08-06 | Stop reason: SDUPTHER

## 2019-06-25 ENCOUNTER — ANNUAL EXAM (OUTPATIENT)
Dept: OBGYN CLINIC | Facility: CLINIC | Age: 24
End: 2019-06-25
Payer: COMMERCIAL

## 2019-06-25 VITALS
SYSTOLIC BLOOD PRESSURE: 110 MMHG | DIASTOLIC BLOOD PRESSURE: 80 MMHG | WEIGHT: 231.4 LBS | HEIGHT: 67 IN | BODY MASS INDEX: 36.32 KG/M2

## 2019-06-25 DIAGNOSIS — R63.5 WEIGHT GAIN: ICD-10-CM

## 2019-06-25 DIAGNOSIS — Z30.41 ENCOUNTER FOR SURVEILLANCE OF CONTRACEPTIVE PILLS: ICD-10-CM

## 2019-06-25 DIAGNOSIS — Z12.4 CERVICAL CANCER SCREENING: Primary | ICD-10-CM

## 2019-06-25 DIAGNOSIS — Z01.419 ENCOUNTER FOR ANNUAL ROUTINE GYNECOLOGICAL EXAMINATION: ICD-10-CM

## 2019-06-25 PROCEDURE — G0145 SCR C/V CYTO,THINLAYER,RESCR: HCPCS | Performed by: OBSTETRICS & GYNECOLOGY

## 2019-06-25 PROCEDURE — 99395 PREV VISIT EST AGE 18-39: CPT | Performed by: OBSTETRICS & GYNECOLOGY

## 2019-06-25 RX ORDER — LEVONORGESTREL AND ETHINYL ESTRADIOL 0.15-0.03
1 KIT ORAL DAILY
Qty: 84 TABLET | Refills: 4 | Status: SHIPPED | OUTPATIENT
Start: 2019-06-25 | End: 2020-07-23 | Stop reason: ALTCHOICE

## 2019-06-28 LAB
LAB AP GYN PRIMARY INTERPRETATION: NORMAL
LAB AP LMP: NORMAL
Lab: NORMAL
PATH INTERP SPEC-IMP: NORMAL

## 2019-07-01 ENCOUNTER — TELEPHONE (OUTPATIENT)
Dept: OBGYN CLINIC | Facility: CLINIC | Age: 24
End: 2019-07-01

## 2019-07-01 NOTE — TELEPHONE ENCOUNTER
----- Message from Louie Reveles DO sent at 6/28/2019  5:34 PM EDT -----  Normal pap, signs of yeast, if she has symptoms, can treat with monistat 7

## 2019-07-01 NOTE — TELEPHONE ENCOUNTER
----- Message from Raul Townsend DO sent at 6/28/2019  5:34 PM EDT -----  Normal pap, signs of yeast, if she has symptoms, can treat with monistat 7

## 2019-08-06 ENCOUNTER — OFFICE VISIT (OUTPATIENT)
Dept: FAMILY MEDICINE CLINIC | Facility: HOSPITAL | Age: 24
End: 2019-08-06
Payer: COMMERCIAL

## 2019-08-06 VITALS
DIASTOLIC BLOOD PRESSURE: 84 MMHG | HEART RATE: 85 BPM | WEIGHT: 237 LBS | HEIGHT: 67 IN | RESPIRATION RATE: 16 BRPM | BODY MASS INDEX: 37.2 KG/M2 | SYSTOLIC BLOOD PRESSURE: 120 MMHG

## 2019-08-06 DIAGNOSIS — R63.5 WEIGHT GAIN: ICD-10-CM

## 2019-08-06 DIAGNOSIS — F41.0 PANIC DISORDER: ICD-10-CM

## 2019-08-06 DIAGNOSIS — J45.20 MILD INTERMITTENT ASTHMA WITHOUT COMPLICATION: ICD-10-CM

## 2019-08-06 DIAGNOSIS — F98.8 ATTENTION DEFICIT DISORDER (ADD) WITHOUT HYPERACTIVITY: Primary | Chronic | ICD-10-CM

## 2019-08-06 PROCEDURE — 99213 OFFICE O/P EST LOW 20 MIN: CPT | Performed by: INTERNAL MEDICINE

## 2019-08-06 RX ORDER — ALPRAZOLAM 0.25 MG/1
0.25 TABLET ORAL 2 TIMES DAILY PRN
Qty: 20 TABLET | Refills: 0 | Status: SHIPPED | OUTPATIENT
Start: 2019-08-06

## 2019-08-06 RX ORDER — METHYLPHENIDATE HYDROCHLORIDE 20 MG/1
20 TABLET ORAL DAILY
Qty: 30 TABLET | Refills: 0 | Status: SHIPPED | OUTPATIENT
Start: 2019-08-06 | End: 2019-08-19 | Stop reason: SDUPTHER

## 2019-08-06 NOTE — PROGRESS NOTES
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- will have sign language etc         Panic disorder     No recent panic attacks                 Subjective:      Patient ID: Ubaldo Jeffrey is a 26 y o  female    1  Anxiety- doing well this summer- no recent panic attacks- have had her fillout form for controlled substance-      The following portions of the patient's history were reviewed and updated as appropriate: allergies, current medications and problem list      Review of Systems   HENT: Negative for congestion and dental problem  All other systems reviewed and are negative  Objective:      Current Outpatient Medications:     ALPRAZolam (XANAX) 0 25 mg tablet, Take 1 tablet (0 25 mg total) by mouth 2 (two) times a day as needed for anxiety (panic attacks), Disp: 20 tablet, Rfl: 0    levalbuterol (XOPENEX HFA) 45 mcg/act inhaler, Inhale 2 puffs every 6 (six) hours as needed for wheezing Intolerant to albuterol, Disp: 1 Inhaler, Rfl: 5    levonorgestrel-ethinyl estradiol (LEVORA 0 15/30, 28,) 0 15-30 MG-MCG per tablet, Take 1 tablet by mouth daily, Disp: 84 tablet, Rfl: 4    meloxicam (MOBIC) 15 mg tablet, Take 1 tablet (15 mg total) by mouth daily (Patient taking differently: 1 daily prn only), Disp: 30 tablet, Rfl: 2    methylphenidate (RITALIN) 20 MG tablet, Take 1 tablet (20 mg total) by mouth daily Max Daily Amount: 20 mg, Disp: 30 tablet, Rfl: 0    Blood pressure 120/84, pulse 85, resp  rate 16, height 5' 6 5" (1 689 m), weight 108 kg (237 lb)  Physical Exam   Constitutional: She is oriented to person, place, and time  She appears well-developed and well-nourished  No distress  Eyes: Right eye exhibits no discharge   Left eye exhibits no discharge  Neck: Normal range of motion  No thyromegaly present  Cardiovascular: Normal rate and regular rhythm  Exam reveals no friction rub  No murmur heard  Pulmonary/Chest: Effort normal and breath sounds normal  No stridor  No respiratory distress  Abdominal: Soft  Bowel sounds are normal  She exhibits no distension  There is no tenderness  Musculoskeletal: Normal range of motion  She exhibits no edema  Lymphadenopathy:     She has no cervical adenopathy  Neurological: She is alert and oriented to person, place, and time  No tremors   Skin: No erythema  No pallor  Psychiatric: She has a normal mood and affect  Her behavior is normal    No tremors, speech is focused   Nursing note and vitals reviewed

## 2019-08-14 ENCOUNTER — HOSPITAL ENCOUNTER (OUTPATIENT)
Dept: ULTRASOUND IMAGING | Facility: HOSPITAL | Age: 24
Discharge: HOME | End: 2019-08-14
Attending: OBSTETRICS & GYNECOLOGY
Payer: COMMERCIAL

## 2019-08-14 DIAGNOSIS — R63.5 WEIGHT GAIN: ICD-10-CM

## 2019-08-14 PROCEDURE — 76830 TRANSVAGINAL US NON-OB: CPT

## 2019-08-14 PROCEDURE — 76856 US EXAM PELVIC COMPLETE: CPT

## 2019-08-19 ENCOUNTER — CLINICAL SUPPORT (OUTPATIENT)
Dept: FAMILY MEDICINE CLINIC | Facility: HOSPITAL | Age: 24
End: 2019-08-19
Payer: COMMERCIAL

## 2019-08-19 ENCOUNTER — OFFICE VISIT (OUTPATIENT)
Dept: FAMILY MEDICINE CLINIC | Facility: HOSPITAL | Age: 24
End: 2019-08-19
Payer: COMMERCIAL

## 2019-08-19 VITALS
HEART RATE: 90 BPM | HEIGHT: 67 IN | WEIGHT: 233.4 LBS | SYSTOLIC BLOOD PRESSURE: 98 MMHG | DIASTOLIC BLOOD PRESSURE: 60 MMHG | BODY MASS INDEX: 36.63 KG/M2

## 2019-08-19 DIAGNOSIS — Z11.1 PPD SCREENING TEST: Primary | ICD-10-CM

## 2019-08-19 DIAGNOSIS — J45.20 MILD INTERMITTENT ASTHMA WITHOUT COMPLICATION: ICD-10-CM

## 2019-08-19 DIAGNOSIS — F98.8 ATTENTION DEFICIT DISORDER (ADD) WITHOUT HYPERACTIVITY: Chronic | ICD-10-CM

## 2019-08-19 DIAGNOSIS — R63.5 WEIGHT GAIN: ICD-10-CM

## 2019-08-19 DIAGNOSIS — E66.09 CLASS 2 OBESITY DUE TO EXCESS CALORIES WITHOUT SERIOUS COMORBIDITY WITH BODY MASS INDEX (BMI) OF 37.0 TO 37.9 IN ADULT: Primary | ICD-10-CM

## 2019-08-19 PROCEDURE — 3008F BODY MASS INDEX DOCD: CPT | Performed by: INTERNAL MEDICINE

## 2019-08-19 PROCEDURE — 99213 OFFICE O/P EST LOW 20 MIN: CPT | Performed by: INTERNAL MEDICINE

## 2019-08-19 PROCEDURE — 86580 TB INTRADERMAL TEST: CPT

## 2019-08-19 RX ORDER — METHYLPHENIDATE HYDROCHLORIDE 20 MG/1
20 TABLET ORAL DAILY
Qty: 30 TABLET | Refills: 0 | Status: SHIPPED | OUTPATIENT
Start: 2019-08-19 | End: 2019-11-29 | Stop reason: ALTCHOICE

## 2019-08-19 NOTE — PROGRESS NOTES
Assessment/Plan:             Problem List Items Addressed This Visit        Respiratory    Mild intermittent asthma without complication     To use prn  xopenex for flare of symptoms            Other    ADD (attention deficit disorder) (Chronic)    Relevant Medications    methylphenidate (RITALIN) 20 MG tablet    Weight gain     Did call to see nutritionist- has appt in September  Is working out at gym 3-4 x a week  Class 2 obesity in adult - Primary            Subjective:      Patient ID: Kieran Jackson is a 26 y o  female    1  Obesity- continue with workouts- had pelvic us- no ovarian cysts   will consider for meds such as  xysma if not improving  The following portions of the patient's history were reviewed and updated as appropriate: allergies, current medications and problem list      Review of Systems   HENT: Negative for congestion  Respiratory: Negative for shortness of breath  Cardiovascular: Negative for chest pain and palpitations  Gastrointestinal: Negative for abdominal distention, diarrhea and nausea  All other systems reviewed and are negative          Objective:      Current Outpatient Medications:     ALPRAZolam (XANAX) 0 25 mg tablet, Take 1 tablet (0 25 mg total) by mouth 2 (two) times a day as needed for anxiety (panic attacks), Disp: 20 tablet, Rfl: 0    levalbuterol (XOPENEX HFA) 45 mcg/act inhaler, Inhale 2 puffs every 6 (six) hours as needed for wheezing Intolerant to albuterol, Disp: 1 Inhaler, Rfl: 5    levonorgestrel-ethinyl estradiol (LEVORA 0 15/30, 28,) 0 15-30 MG-MCG per tablet, Take 1 tablet by mouth daily, Disp: 84 tablet, Rfl: 4    meloxicam (MOBIC) 15 mg tablet, Take 1 tablet (15 mg total) by mouth daily (Patient taking differently: 1 daily prn only), Disp: 30 tablet, Rfl: 2    methylphenidate (RITALIN) 20 MG tablet, Take 1 tablet (20 mg total) by mouth dailyMax Daily Amount: 20 mg, Disp: 30 tablet, Rfl: 0    Blood pressure 98/60, pulse 90, height 5' 6 5" (1 689 m), weight 106 kg (233 lb 6 4 oz)  Physical Exam   Constitutional: She is oriented to person, place, and time  She appears well-developed and well-nourished  No distress  HENT:   Head: Normocephalic  Right Ear: External ear normal    Left Ear: External ear normal    Nose: Nose normal    Mouth/Throat: Oropharynx is clear and moist    Eyes: Pupils are equal, round, and reactive to light  Conjunctivae are normal    Neck: Normal range of motion  No thyromegaly present  Cardiovascular: Normal rate and regular rhythm  Exam reveals no friction rub  No murmur heard  Pulmonary/Chest: Effort normal and breath sounds normal  No stridor  No respiratory distress  Abdominal: Soft  Bowel sounds are normal  She exhibits no distension  There is no tenderness  Musculoskeletal: Normal range of motion  She exhibits no edema  Lymphadenopathy:     She has no cervical adenopathy  Neurological: She is alert and oriented to person, place, and time  No tremors   Skin: No erythema  No pallor  Psychiatric: She has a normal mood and affect  Her behavior is normal    No tremors, speech is focused   Nursing note and vitals reviewed

## 2019-08-21 ENCOUNTER — CLINICAL SUPPORT (OUTPATIENT)
Dept: FAMILY MEDICINE CLINIC | Facility: HOSPITAL | Age: 24
End: 2019-08-21

## 2019-08-21 DIAGNOSIS — Z11.1 PPD SCREENING TEST: Primary | ICD-10-CM

## 2019-08-21 LAB
INDURATION: 0 MM
TB SKIN TEST: NEGATIVE

## 2019-10-08 ENCOUNTER — TELEPHONE (OUTPATIENT)
Dept: OBGYN CLINIC | Facility: CLINIC | Age: 24
End: 2019-10-08

## 2019-10-08 NOTE — TELEPHONE ENCOUNTER
Patient's Mother called about patient's BCP refills  Informed Mother that Rx Neal Chamorro was sent to [facility] Mail order on 6/25/19, 90 day supply with 4 refills  [facility]

## 2019-11-29 ENCOUNTER — OFFICE VISIT (OUTPATIENT)
Dept: FAMILY MEDICINE CLINIC | Facility: HOSPITAL | Age: 24
End: 2019-11-29
Payer: COMMERCIAL

## 2019-11-29 VITALS
OXYGEN SATURATION: 98 % | HEART RATE: 89 BPM | DIASTOLIC BLOOD PRESSURE: 64 MMHG | HEIGHT: 67 IN | WEIGHT: 248 LBS | BODY MASS INDEX: 38.92 KG/M2 | SYSTOLIC BLOOD PRESSURE: 102 MMHG

## 2019-11-29 DIAGNOSIS — F98.8 ATTENTION DEFICIT DISORDER (ADD) WITHOUT HYPERACTIVITY: Chronic | ICD-10-CM

## 2019-11-29 DIAGNOSIS — J45.20 MILD INTERMITTENT ASTHMA WITHOUT COMPLICATION: Primary | ICD-10-CM

## 2019-11-29 DIAGNOSIS — J45.20 MILD INTERMITTENT ASTHMA WITHOUT COMPLICATION: ICD-10-CM

## 2019-11-29 PROCEDURE — 3008F BODY MASS INDEX DOCD: CPT | Performed by: INTERNAL MEDICINE

## 2019-11-29 PROCEDURE — 99214 OFFICE O/P EST MOD 30 MIN: CPT | Performed by: INTERNAL MEDICINE

## 2019-11-29 PROCEDURE — 1036F TOBACCO NON-USER: CPT | Performed by: INTERNAL MEDICINE

## 2019-11-29 RX ORDER — METHYLPHENIDATE HYDROCHLORIDE 30 MG/1
30 CAPSULE, EXTENDED RELEASE ORAL DAILY
Qty: 30 CAPSULE | Refills: 0 | Status: SHIPPED | OUTPATIENT
Start: 2019-11-29 | End: 2019-11-29 | Stop reason: SDUPTHER

## 2019-11-29 RX ORDER — METHYLPHENIDATE HYDROCHLORIDE 30 MG/1
30 CAPSULE, EXTENDED RELEASE ORAL DAILY
Qty: 30 CAPSULE | Refills: 0 | Status: SHIPPED | OUTPATIENT
Start: 2019-11-29 | End: 2020-01-19 | Stop reason: SDUPTHER

## 2019-11-29 RX ORDER — FLUTICASONE FUROATE AND VILANTEROL 100; 25 UG/1; UG/1
1 POWDER RESPIRATORY (INHALATION) DAILY
Qty: 1 INHALER | Refills: 5 | Status: SHIPPED | OUTPATIENT
Start: 2019-11-29 | End: 2019-11-29 | Stop reason: SDUPTHER

## 2019-11-29 RX ORDER — FLUTICASONE FUROATE AND VILANTEROL 100; 25 UG/1; UG/1
1 POWDER RESPIRATORY (INHALATION) DAILY
Qty: 1 INHALER | Refills: 5 | Status: SHIPPED | OUTPATIENT
Start: 2019-11-29 | End: 2020-01-19 | Stop reason: SDUPTHER

## 2019-11-29 NOTE — PROGRESS NOTES
Assessment/Plan:             Problem List Items Addressed This Visit        Respiratory    Mild intermittent asthma without complication - Primary     No tightness today- last month had flare of allergies            Other    ADD (attention deficit disorder) (Chronic)     Using methylphnidate-on 20 mg  Daily- using daily with school- goes to bed at midnight- will switch to long activng form                 Subjective:      Patient ID: Osiris Slater is a 27 y o  female    some sob and occasional wheezing- after walking outside had to lie on the floor and catch her breath- using prn inhaler- not on any long acting controller inhaler      The following portions of the patient's history were reviewed and updated as appropriate: allergies, current medications and problem list      Review of Systems   HENT: Negative for congestion and sinus pressure  No discolored draiange   Respiratory: Positive for cough and shortness of breath  Cardiovascular: Negative for chest pain and palpitations  Gastrointestinal: Negative for abdominal pain and anal bleeding  Psychiatric/Behavioral: Positive for decreased concentration  With upcoming finals has had difficulty with meds wearing off with add         Objective:      Current Outpatient Medications:     ALPRAZolam (XANAX) 0 25 mg tablet, Take 1 tablet (0 25 mg total) by mouth 2 (two) times a day as needed for anxiety (panic attacks), Disp: 20 tablet, Rfl: 0    levalbuterol (XOPENEX HFA) 45 mcg/act inhaler, Inhale 2 puffs every 6 (six) hours as needed for wheezing Intolerant to albuterol, Disp: 1 Inhaler, Rfl: 5    levonorgestrel-ethinyl estradiol (LEVORA 0 15/30, 28,) 0 15-30 MG-MCG per tablet, Take 1 tablet by mouth daily, Disp: 84 tablet, Rfl: 4    Blood pressure 102/64, pulse 89, height 5' 6 5" (1 689 m), weight 112 kg (248 lb), last menstrual period 11/19/2019, SpO2 98 %  Physical Exam   Constitutional: She appears well-developed and well-nourished  No distress  HENT:   Head: Normocephalic  Right Ear: External ear normal    Left Ear: External ear normal    Eyes: Left eye exhibits no discharge  Neck: No thyromegaly present  Cardiovascular: Normal rate and regular rhythm  Exam reveals no friction rub  No murmur heard  Pulmonary/Chest: Effort normal and breath sounds normal    Mild prolonged exp phase   Skin: She is not diaphoretic  Nursing note and vitals reviewed

## 2019-11-29 NOTE — ASSESSMENT & PLAN NOTE
Using methylphnidate-on 20 mg  Daily- using daily with school- goes to bed at midnight- will switch to long activng form

## 2020-01-19 DIAGNOSIS — J45.20 MILD INTERMITTENT ASTHMA WITHOUT COMPLICATION: ICD-10-CM

## 2020-01-19 DIAGNOSIS — F98.8 ATTENTION DEFICIT DISORDER (ADD) WITHOUT HYPERACTIVITY: Chronic | ICD-10-CM

## 2020-01-21 RX ORDER — FLUTICASONE FUROATE AND VILANTEROL 100; 25 UG/1; UG/1
1 POWDER RESPIRATORY (INHALATION) DAILY
Qty: 3 INHALER | Refills: 1 | Status: SHIPPED | OUTPATIENT
Start: 2020-01-21 | End: 2020-05-29 | Stop reason: SDUPTHER

## 2020-01-21 RX ORDER — METHYLPHENIDATE HYDROCHLORIDE 30 MG/1
30 CAPSULE, EXTENDED RELEASE ORAL DAILY
Qty: 30 CAPSULE | Refills: 0 | Status: SHIPPED | OUTPATIENT
Start: 2020-01-21 | End: 2020-05-29 | Stop reason: SDUPTHER

## 2020-03-12 ENCOUNTER — OFFICE VISIT (OUTPATIENT)
Dept: FAMILY MEDICINE CLINIC | Facility: HOSPITAL | Age: 25
End: 2020-03-12
Payer: COMMERCIAL

## 2020-03-12 VITALS
WEIGHT: 260 LBS | HEART RATE: 90 BPM | DIASTOLIC BLOOD PRESSURE: 78 MMHG | SYSTOLIC BLOOD PRESSURE: 106 MMHG | BODY MASS INDEX: 40.81 KG/M2 | HEIGHT: 67 IN

## 2020-03-12 DIAGNOSIS — E66.01 CLASS 3 SEVERE OBESITY IN ADULT, UNSPECIFIED BMI, UNSPECIFIED OBESITY TYPE, UNSPECIFIED WHETHER SERIOUS COMORBIDITY PRESENT (HCC): ICD-10-CM

## 2020-03-12 DIAGNOSIS — N64.4 BREAST PAIN: Primary | ICD-10-CM

## 2020-03-12 PROCEDURE — 99214 OFFICE O/P EST MOD 30 MIN: CPT | Performed by: PHYSICIAN ASSISTANT

## 2020-03-12 PROCEDURE — 1036F TOBACCO NON-USER: CPT | Performed by: PHYSICIAN ASSISTANT

## 2020-03-12 PROCEDURE — 3008F BODY MASS INDEX DOCD: CPT | Performed by: PHYSICIAN ASSISTANT

## 2020-03-12 RX ORDER — PHENTERMINE HYDROCHLORIDE 30 MG/1
30 CAPSULE ORAL EVERY MORNING
Qty: 30 CAPSULE | Refills: 2 | Status: SHIPPED | OUTPATIENT
Start: 2020-03-12 | End: 2020-05-29 | Stop reason: SDUPTHER

## 2020-03-12 NOTE — PATIENT INSTRUCTIONS
Start taking a multivitamin, and vitamin D 4000 IU daily  Referred to get US of left breast, encouraged self breast exams monthly  Try Motrin or Advil for pain  Monitor pain, and menses  Discussed meal planning, started on Phentermine daily  Recommend repeating blood test in May

## 2020-03-12 NOTE — PROGRESS NOTES
Assessment/Plan:         Diagnoses and all orders for this visit:    Breast pain  -     US breast left limited (diagnostic); Future    Class 3 severe obesity in adult, unspecified BMI, unspecified obesity type, unspecified whether serious comorbidity present ([facility] [address] )  -     phentermine 30 MG capsule; Take 1 capsule (30 mg total) by mouth every morning        Subjective:      Patient ID: Mayelin Lacy is a 27 y o  female  27year old white female presents with c/o left breast pain past 1 week, LNMP-  2/15/2020, regular, not currently sexually active  On OCP, same one brand for past 1 year  Last week, Thursday, felt a "pulling pain", affecting left breast in the evening while in bed watching TV  No hx  Of pregnancy  Very concerned with weight loss  Breakfast- yogurt, muffin, sometimes skips (2-3 days a week); lunch- sandwich, cold cut, or chicken salad; dinner- protein, veggies and starch; sometimes skips protein at dinner  Snacks- crackers, wheat, sometimes cheese  Goes to [employer]  Wants to be a teacher  [address] mother dx  With breast cancer in her 63's, and does not have the gene  Review of Systems   Respiratory: Negative for cough and shortness of breath  Cardiovascular: Negative for chest pain, palpitations and leg swelling  Gastrointestinal: Negative for abdominal pain, constipation, diarrhea, nausea and vomiting  Genitourinary: Negative for menstrual problem, vaginal bleeding and vaginal discharge  Neurological: Negative for weakness  Objective:      /78   Pulse 90   Ht 5' 6 5" (1 689 m)   Wt 118 kg (260 lb)   LMP 02/12/2020 (Exact Date)   BMI 41 34 kg/m²          Physical Exam   Constitutional: She is oriented to person, place, and time  She appears well-developed and well-nourished  No distress  Overweight  Pulmonary/Chest: Effort normal and breath sounds normal  Chest wall is not dull to percussion   She exhibits no mass, no tenderness, no bony tenderness, no laceration, no crepitus, no edema, no deformity, no swelling and no retraction  Right breast exhibits no inverted nipple, no mass, no nipple discharge, no skin change and no tenderness  Left breast exhibits mass and tenderness  Left breast exhibits no inverted nipple, no nipple discharge and no skin change  No breast swelling, tenderness, discharge or bleeding  Breasts are symmetrical        Musculoskeletal: Normal range of motion  She exhibits no edema, tenderness or deformity  Neurological: She is alert and oriented to person, place, and time  Skin: She is not diaphoretic  Psychiatric: She has a normal mood and affect  Her behavior is normal  Judgment and thought content normal    Nursing note and vitals reviewed

## 2020-03-13 ENCOUNTER — HOSPITAL ENCOUNTER (OUTPATIENT)
Dept: ULTRASOUND IMAGING | Facility: CLINIC | Age: 25
Discharge: HOME | End: 2020-03-13
Payer: COMMERCIAL

## 2020-03-13 VITALS — BODY MASS INDEX: 41.78 KG/M2 | WEIGHT: 260 LBS | HEIGHT: 66 IN

## 2020-03-13 DIAGNOSIS — N64.4 BREAST PAIN: ICD-10-CM

## 2020-03-13 PROCEDURE — 76642 ULTRASOUND BREAST LIMITED: CPT

## 2020-05-29 ENCOUNTER — OFFICE VISIT (OUTPATIENT)
Dept: FAMILY MEDICINE CLINIC | Facility: HOSPITAL | Age: 25
End: 2020-05-29
Payer: COMMERCIAL

## 2020-05-29 VITALS
HEART RATE: 99 BPM | OXYGEN SATURATION: 97 % | DIASTOLIC BLOOD PRESSURE: 66 MMHG | WEIGHT: 261.6 LBS | HEIGHT: 66 IN | BODY MASS INDEX: 42.04 KG/M2 | SYSTOLIC BLOOD PRESSURE: 94 MMHG | TEMPERATURE: 98.2 F

## 2020-05-29 DIAGNOSIS — Z23 NEED FOR PNEUMOCOCCAL VACCINATION: ICD-10-CM

## 2020-05-29 DIAGNOSIS — F98.8 ATTENTION DEFICIT DISORDER (ADD) WITHOUT HYPERACTIVITY: Chronic | ICD-10-CM

## 2020-05-29 DIAGNOSIS — E66.01 CLASS 3 SEVERE OBESITY IN ADULT, UNSPECIFIED BMI, UNSPECIFIED OBESITY TYPE, UNSPECIFIED WHETHER SERIOUS COMORBIDITY PRESENT (HCC): ICD-10-CM

## 2020-05-29 DIAGNOSIS — J01.00 ACUTE NON-RECURRENT MAXILLARY SINUSITIS: Primary | ICD-10-CM

## 2020-05-29 DIAGNOSIS — J45.20 MILD INTERMITTENT ASTHMA WITHOUT COMPLICATION: ICD-10-CM

## 2020-05-29 PROCEDURE — 99214 OFFICE O/P EST MOD 30 MIN: CPT | Performed by: INTERNAL MEDICINE

## 2020-05-29 PROCEDURE — 90471 IMMUNIZATION ADMIN: CPT

## 2020-05-29 PROCEDURE — 90732 PPSV23 VACC 2 YRS+ SUBQ/IM: CPT

## 2020-05-29 PROCEDURE — 1036F TOBACCO NON-USER: CPT | Performed by: INTERNAL MEDICINE

## 2020-05-29 PROCEDURE — 3008F BODY MASS INDEX DOCD: CPT | Performed by: INTERNAL MEDICINE

## 2020-05-29 RX ORDER — CEFUROXIME AXETIL 250 MG/1
250 TABLET ORAL EVERY 12 HOURS SCHEDULED
Qty: 20 TABLET | Refills: 0 | Status: SHIPPED | OUTPATIENT
Start: 2020-05-29 | End: 2020-06-08

## 2020-05-29 RX ORDER — CEFUROXIME AXETIL 250 MG/1
250 TABLET ORAL EVERY 12 HOURS SCHEDULED
Qty: 20 TABLET | Refills: 0 | Status: SHIPPED | OUTPATIENT
Start: 2020-05-29 | End: 2020-05-29

## 2020-05-29 RX ORDER — FLUTICASONE FUROATE AND VILANTEROL 100; 25 UG/1; UG/1
1 POWDER RESPIRATORY (INHALATION) DAILY
Qty: 3 INHALER | Refills: 1 | Status: SHIPPED | OUTPATIENT
Start: 2020-05-29 | End: 2020-08-17 | Stop reason: SDUPTHER

## 2020-05-29 RX ORDER — METHYLPHENIDATE HYDROCHLORIDE 30 MG/1
30 CAPSULE, EXTENDED RELEASE ORAL DAILY
Qty: 30 CAPSULE | Refills: 0 | Status: SHIPPED | OUTPATIENT
Start: 2020-05-29 | End: 2020-08-17 | Stop reason: SDUPTHER

## 2020-05-29 RX ORDER — PHENTERMINE HYDROCHLORIDE 30 MG/1
30 CAPSULE ORAL EVERY MORNING
Qty: 30 CAPSULE | Refills: 2 | Status: SHIPPED | OUTPATIENT
Start: 2020-05-29 | End: 2020-08-17 | Stop reason: SDUPTHER

## 2020-07-23 ENCOUNTER — ANNUAL EXAM (OUTPATIENT)
Dept: OBGYN CLINIC | Facility: CLINIC | Age: 25
End: 2020-07-23
Payer: COMMERCIAL

## 2020-07-23 VITALS
BODY MASS INDEX: 40.97 KG/M2 | HEIGHT: 67 IN | WEIGHT: 261 LBS | SYSTOLIC BLOOD PRESSURE: 100 MMHG | TEMPERATURE: 97.6 F | DIASTOLIC BLOOD PRESSURE: 78 MMHG

## 2020-07-23 DIAGNOSIS — Z12.4 SCREENING FOR CERVICAL CANCER: Primary | ICD-10-CM

## 2020-07-23 DIAGNOSIS — Z30.41 SURVEILLANCE FOR BIRTH CONTROL, ORAL CONTRACEPTIVES: ICD-10-CM

## 2020-07-23 DIAGNOSIS — Z01.419 ENCOUNTER FOR ANNUAL ROUTINE GYNECOLOGICAL EXAMINATION: ICD-10-CM

## 2020-07-23 PROCEDURE — G0145 SCR C/V CYTO,THINLAYER,RESCR: HCPCS | Performed by: OBSTETRICS & GYNECOLOGY

## 2020-07-23 PROCEDURE — 99395 PREV VISIT EST AGE 18-39: CPT | Performed by: OBSTETRICS & GYNECOLOGY

## 2020-07-23 RX ORDER — NORGESTIMATE AND ETHINYL ESTRADIOL 0.25-0.035
1 KIT ORAL DAILY
Qty: 84 TABLET | Refills: 4 | Status: SHIPPED | OUTPATIENT
Start: 2020-07-23 | End: 2020-08-17 | Stop reason: SDUPTHER

## 2020-07-23 RX ORDER — NORGESTIMATE AND ETHINYL ESTRADIOL 0.25-0.035
1 KIT ORAL DAILY
Qty: 28 TABLET | Refills: 0 | Status: SHIPPED | OUTPATIENT
Start: 2020-07-23 | End: 2020-11-08 | Stop reason: SDUPTHER

## 2020-07-23 NOTE — PROGRESS NOTES
Assessment/Plan:    pap is up to date    Discussed self breast exams    Contraception-we discussed trying a different pill to see if it would help with her weight loss efforts  Prescription for Sprintec sent to her pharmacy  She will call if she has any questions or concerns with this  discussed regular exercise and a healthy diet      No problem-specific Assessment & Plan notes found for this encounter  Diagnoses and all orders for this visit:    Screening for cervical cancer  -     Liquid-based pap, screening    Encounter for annual routine gynecological examination    Surveillance for birth control, oral contraceptives  -     norgestimate-ethinyl estradiol (ORTHO-CYCLEN) 0 25-35 MG-MCG per tablet; Take 1 tablet by mouth daily  -     norgestimate-ethinyl estradiol (ORTHO-CYCLEN) 0 25-35 MG-MCG per tablet; Take 1 tablet by mouth daily          Subjective:      Patient ID: Terry Chaney is a 27 y o  female  Pt here for yearly  She has no gyn complaints  She has been on Lo for a without any side effects or problems  She has gained 30 lb since last year  This has been a difficult issue for her  Her internist recently placed her on weight loss medications  She feels well on them  Normal pap in 2019, negative chlamydia and gonorrhea in 2018  No new partner, not currently sexually active  The following portions of the patient's history were reviewed and updated as appropriate: allergies, current medications, past family history, past medical history, past social history, past surgical history and problem list     Review of Systems   Constitutional: Negative  Gastrointestinal: Negative  Genitourinary: Negative            Objective:      /78 (BP Location: Right arm, Patient Position: Sitting, Cuff Size: Standard)   Temp 97 6 °F (36 4 °C)   Ht 5' 6 5" (1 689 m)   Wt 118 kg (261 lb)   LMP 06/22/2020   BMI 41 50 kg/m²          Physical Exam   Constitutional: She appears well-developed  Neck: No tracheal deviation present  No thyromegaly present  Cardiovascular: Normal rate and regular rhythm  Pulmonary/Chest: Effort normal and breath sounds normal  Right breast exhibits no inverted nipple, no mass, no nipple discharge, no skin change and no tenderness  Left breast exhibits no inverted nipple, no mass, no nipple discharge, no skin change and no tenderness  Breasts are symmetrical    Examined seated and supine   Abdominal: Soft  She exhibits no distension and no mass  There is no tenderness  Genitourinary: Vagina normal and uterus normal  No labial fusion  There is no rash, tenderness, lesion or injury on the right labia  There is no rash, tenderness, lesion or injury on the left labia  Cervix exhibits no motion tenderness, no discharge and no friability  Right adnexum displays no mass, no tenderness and no fullness  Left adnexum displays no mass, no tenderness and no fullness  Vitals reviewed

## 2020-07-30 LAB
LAB AP GYN PRIMARY INTERPRETATION: NORMAL
Lab: NORMAL
PATH INTERP SPEC-IMP: NORMAL

## 2020-08-12 DIAGNOSIS — Z30.41 SURVEILLANCE FOR BIRTH CONTROL, ORAL CONTRACEPTIVES: ICD-10-CM

## 2020-08-17 ENCOUNTER — OFFICE VISIT (OUTPATIENT)
Dept: FAMILY MEDICINE CLINIC | Facility: HOSPITAL | Age: 25
End: 2020-08-17
Payer: COMMERCIAL

## 2020-08-17 VITALS
HEART RATE: 87 BPM | TEMPERATURE: 97.9 F | SYSTOLIC BLOOD PRESSURE: 98 MMHG | HEIGHT: 67 IN | DIASTOLIC BLOOD PRESSURE: 76 MMHG | OXYGEN SATURATION: 96 % | BODY MASS INDEX: 41.31 KG/M2 | WEIGHT: 263.2 LBS

## 2020-08-17 DIAGNOSIS — G43.009 MIGRAINE WITHOUT AURA AND WITHOUT STATUS MIGRAINOSUS, NOT INTRACTABLE: ICD-10-CM

## 2020-08-17 DIAGNOSIS — H69.82 ACUTE DYSFUNCTION OF LEFT EUSTACHIAN TUBE: ICD-10-CM

## 2020-08-17 DIAGNOSIS — J45.20 MILD INTERMITTENT ASTHMA WITHOUT COMPLICATION: ICD-10-CM

## 2020-08-17 DIAGNOSIS — F98.8 ATTENTION DEFICIT DISORDER (ADD) WITHOUT HYPERACTIVITY: Chronic | ICD-10-CM

## 2020-08-17 DIAGNOSIS — E66.01 CLASS 3 SEVERE OBESITY IN ADULT, UNSPECIFIED BMI, UNSPECIFIED OBESITY TYPE, UNSPECIFIED WHETHER SERIOUS COMORBIDITY PRESENT (HCC): ICD-10-CM

## 2020-08-17 DIAGNOSIS — F51.01 PRIMARY INSOMNIA: ICD-10-CM

## 2020-08-17 DIAGNOSIS — Z00.00 ANNUAL PHYSICAL EXAM: Primary | ICD-10-CM

## 2020-08-17 PROBLEM — G43.109 MIGRAINE WITH AURA AND WITHOUT STATUS MIGRAINOSUS, NOT INTRACTABLE: Status: ACTIVE | Noted: 2020-08-17

## 2020-08-17 PROCEDURE — 99395 PREV VISIT EST AGE 18-39: CPT | Performed by: INTERNAL MEDICINE

## 2020-08-17 RX ORDER — METHYLPREDNISOLONE 4 MG/1
TABLET ORAL
Qty: 21 EACH | Refills: 0 | Status: SHIPPED | OUTPATIENT
Start: 2020-08-17 | End: 2020-11-27 | Stop reason: ALTCHOICE

## 2020-08-17 RX ORDER — ZOLPIDEM TARTRATE 5 MG/1
5 TABLET ORAL
Qty: 30 TABLET | Refills: 0 | Status: SHIPPED | OUTPATIENT
Start: 2020-08-17 | End: 2021-09-08 | Stop reason: SDUPTHER

## 2020-08-17 RX ORDER — METHYLPHENIDATE HYDROCHLORIDE 30 MG/1
30 CAPSULE, EXTENDED RELEASE ORAL DAILY
Qty: 30 CAPSULE | Refills: 0 | Status: SHIPPED | OUTPATIENT
Start: 2020-08-17 | End: 2020-10-14 | Stop reason: SDUPTHER

## 2020-08-17 RX ORDER — RIZATRIPTAN BENZOATE 5 MG/1
5 TABLET ORAL ONCE AS NEEDED
Qty: 9 TABLET | Refills: 0 | Status: SHIPPED | OUTPATIENT
Start: 2020-08-17 | End: 2020-09-09

## 2020-08-17 RX ORDER — PHENTERMINE HYDROCHLORIDE 30 MG/1
30 CAPSULE ORAL EVERY MORNING
Qty: 30 CAPSULE | Refills: 2 | Status: SHIPPED | OUTPATIENT
Start: 2020-08-17 | End: 2021-03-01 | Stop reason: SDUPTHER

## 2020-08-17 RX ORDER — ONDANSETRON 4 MG/1
4 TABLET, FILM COATED ORAL EVERY 8 HOURS PRN
Qty: 12 TABLET | Refills: 0 | Status: SHIPPED | OUTPATIENT
Start: 2020-08-17 | End: 2020-11-27 | Stop reason: SDUPTHER

## 2020-08-17 RX ORDER — FLUTICASONE FUROATE AND VILANTEROL 100; 25 UG/1; UG/1
1 POWDER RESPIRATORY (INHALATION) DAILY
Qty: 3 INHALER | Refills: 1 | Status: SHIPPED | OUTPATIENT
Start: 2020-08-17 | End: 2022-03-15 | Stop reason: ALTCHOICE

## 2020-08-17 NOTE — PROGRESS NOTES
ADULT ANNUAL [address] [facility]    NAME: Krishna Cuellar  AGE: 27 y o  SEX: female  : 1995     DATE: 2020     Assessment and Plan:     Problem List Items Addressed This Visit        Respiratory    Mild intermittent asthma without complication    Relevant Medications    fluticasone-vilanterol (BREO ELLIPTA) 100-25 mcg/inh inhaler       Other    ADD (attention deficit disorder) (Chronic)    Relevant Medications    methylphenidate (METADATE CD) 30 MG CR capsule    phentermine 30 MG capsule    zolpidem (AMBIEN) 5 mg tablet      Other Visit Diagnoses     Annual physical exam    -  Primary    Class 3 severe obesity in adult, unspecified BMI, unspecified obesity type, unspecified whether serious comorbidity present (HCC)        Relevant Medications    phentermine 30 MG capsule    Primary insomnia        Relevant Medications    zolpidem (AMBIEN) 5 mg tablet    Acute dysfunction of left eustachian tube        Relevant Medications    methylPREDNISolone 4 MG tablet therapy pack    Migraine without aura and without status migrainosus, not intractable        Relevant Medications    rizatriptan (MAXALT) 5 MG tablet    ondansetron (ZOFRAN) 4 mg tablet          Immunizations and preventive care screenings were discussed with patient today  Appropriate education was printed on patient's after visit summary  Counseling:  · Dental Health: discussed importance of regular tooth brushing, flossing, and dental visits  Return in about 3 months (around 2020)  Chief Complaint:     Chief Complaint   Patient presents with    Annual Exam      History of Present Illness:     Adult Annual Physical   Patient here for a comprehensive physical exam  The patient reports asthma- had flare when hiking in woods last month-     Diet and Physical Activity  · Diet/Nutrition: consuming 3-5 servings of fruits/vegetables daily     · Exercise: walking  Depression Screening  PHQ-9 Depression Screening    PHQ-9:    Frequency of the following problems over the past two weeks:            General Health  · Sleep: gets 4-6 hours of sleep on average  · Hearing: decreased - left  · Vision: no vision problems  · Dental: regular dental visits  /GYN Health  · Last menstrual period:regualr - seen by Dr Aniket Lindsay this month  · Contraceptive method: oral contraceptives  ·      Review of Systems:     Review of Systems   Constitutional: Negative for fatigue  HENT: Negative for congestion  Respiratory: Negative for wheezing  All other systems reviewed and are negative  Past Medical History:     Past Medical History:   Diagnosis Date    Attention and concentration deficit     last assessed: Nov 2, 2016    Status asthmaticus       Past Surgical History:     Past Surgical History:   Procedure Laterality Date    APPENDECTOMY      TONSILLECTOMY      TOOTH EXTRACTION        Social History:     E-Cigarette/Vaping    E-Cigarette Use Never User      E-Cigarette/Vaping Substances    Nicotine No     THC No     CBD No     Flavoring No     Other No     Unknown No      Social History     Socioeconomic History    Marital status: Single     Spouse name: None    Number of children: None    Years of education: None    Highest education level: None   Occupational History    None   Social Needs    Financial resource strain: None    Food insecurity     Worry: None     Inability: None    Transportation needs     Medical: No     Non-medical: No   Tobacco Use    Smoking status: Never Smoker    Smokeless tobacco: Never Used   Substance and Sexual Activity    Alcohol use: No    Drug use: No    Sexual activity: Yes     Partners: Male     Birth control/protection: Condom, OCP   Lifestyle    Physical activity     Days per week: 5 days     Minutes per session: 120 min    Stress:  Only a little   Relationships    Social connections     Talks on phone: None     Gets together: None     Attends [employer] service: None     Active member of club or organization: None     Attends meetings of clubs or organizations: None     Relationship status: None    Intimate partner violence     Fear of current or ex partner: None     Emotionally abused: None     Physically abused: None     Forced sexual activity: None   Other Topics Concern    None   Social History Narrative    Always uses seat belt     Caffeine use     Regular dental care    Exercise habits    Lives with mom    Feels safe at home    No living will      Family History:     Family History   Problem Relation Age of Onset    Anxiety disorder Father     Anxiety disorder Paternal Grandfather     Thyroid disease Paternal Grandfather     Throat cancer Paternal Grandfather     No Known Problems Mother     No Known Problems Brother     Breast cancer Maternal Grandmother     No Known Problems Maternal Grandfather     No Known Problems Paternal Grandmother     No Known Problems Maternal Aunt     No Known Problems Paternal Aunt     Substance Abuse Neg Hx     Mental illness Neg Hx       Current Medications:     Current Outpatient Medications   Medication Sig Dispense Refill    fluticasone-vilanterol (BREO ELLIPTA) 100-25 mcg/inh inhaler Inhale 1 puff daily Rinse mouth after use   3 Inhaler 1    levalbuterol (XOPENEX HFA) 45 mcg/act inhaler Inhale 2 puffs every 6 (six) hours as needed for wheezing Intolerant to albuterol 1 Inhaler 5    methylphenidate (METADATE CD) 30 MG CR capsule Take 1 capsule (30 mg total) by mouth dailyMax Daily Amount: 30 mg 30 capsule 0    norgestimate-ethinyl estradiol (ORTHO-CYCLEN) 0 25-35 MG-MCG per tablet Take 1 tablet by mouth daily 28 tablet 0    phentermine 30 MG capsule Take 1 capsule (30 mg total) by mouth every morning 30 capsule 2    ALPRAZolam (XANAX) 0 25 mg tablet Take 1 tablet (0 25 mg total) by mouth 2 (two) times a day as needed for anxiety (panic attacks) (Patient not taking: Reported on 7/23/2020) 20 tablet 0    methylPREDNISolone 4 MG tablet therapy pack Use as directed on package 21 each 0    ondansetron (ZOFRAN) 4 mg tablet Take 1 tablet (4 mg total) by mouth every 8 (eight) hours as needed for nausea or vomiting for up to 4 days 12 tablet 0    rizatriptan (MAXALT) 5 MG tablet Take 1 tablet (5 mg total) by mouth once as needed for migraine for up to 1 dose May repeat in 2 hours if needed 9 tablet 0    zolpidem (AMBIEN) 5 mg tablet Take 1 tablet (5 mg total) by mouth daily at bedtime as needed for sleep 30 tablet 0     No current facility-administered medications for this visit  Allergies:     No Known Allergies   Physical Exam:     BP 98/76   Pulse 87   Temp 97 9 °F (36 6 °C)   Ht 5' 6 5" (1 689 m)   Wt 119 kg (263 lb 3 2 oz)   SpO2 96%   BMI 41 85 kg/m²     Physical Exam  Vitals signs and nursing note reviewed  Constitutional:       Appearance: Normal appearance  HENT:      Head: Normocephalic and atraumatic  Ears:      Comments: Left tm dull with air fluid levels     Nose: No congestion  Mouth/Throat:      Mouth: Mucous membranes are moist       Pharynx: No posterior oropharyngeal erythema  Neck:      Musculoskeletal: Normal range of motion  No muscular tenderness  Cardiovascular:      Rate and Rhythm: Normal rate and regular rhythm  Pulses: Normal pulses  Heart sounds: Normal heart sounds  Pulmonary:      Effort: Pulmonary effort is normal       Breath sounds: Normal breath sounds  No stridor  Abdominal:      Palpations: Abdomen is soft  Comments: overweight   Musculoskeletal:         General: No swelling or tenderness  Neurological:      General: No focal deficit present  Mental Status: She is alert            DO MARGARITO Cooper [facility]

## 2020-08-17 NOTE — PATIENT INSTRUCTIONS
Use medrol dose pack for ear issues- if not improving will need ent evaluation  Use ambien nightly for 5 night     Wellness Visit for Adults   AMBULATORY CARE:   A wellness visit  is when you see your healthcare provider to get screened for health problems  You can also get advice on how to stay healthy  Write down your questions so you remember to ask them  Ask your healthcare provider how often you should have a wellness visit  What happens at a wellness visit:  Your healthcare provider will ask about your health, and your family history of health problems  This includes high blood pressure, heart disease, and cancer  He or she will ask if you have symptoms that concern you, if you smoke, and about your mood  You may also be asked about your intake of medicines, supplements, food, and alcohol  Any of the following may be done:  · Your weight  will be checked  Your height may also be checked so your body mass index (BMI) can be calculated  Your BMI shows if you are at a healthy weight  · Your blood pressure  and heart rate will be checked  Your temperature may also be checked  · Blood and urine tests  may be done  Blood tests may be done to check your cholesterol levels  Abnormal cholesterol levels increase your risk for heart disease and stroke  You may also need a blood or urine test to check for diabetes if you are at increased risk  Urine tests may be done to look for signs of an infection or kidney disease  · A physical exam  includes checking your heartbeat and lungs with a stethoscope  Your healthcare provider may also check your skin to look for sun damage  · Screening tests  may be recommended  A screening test is done to check for diseases that may not cause symptoms  The screening tests you may need depend on your age, gender, family history, and lifestyle habits  For example, colorectal screening may be recommended if you are 48years old or older    Screening tests you need if you are a woman:   · A Pap smear  is used to screen for cervical cancer  Pap smears are usually done every 3 to 5 years depending on your age  You may need them more often if you have had abnormal Pap smear test results in the past  Ask your healthcare provider how often you should have a Pap smear  · A mammogram  is an x-ray of your breasts to screen for breast cancer  Experts recommend mammograms every 2 years starting at age 48 years  You may need a mammogram at age 52 years or younger if you have an increased risk for breast cancer  Talk to your healthcare provider about when you should start having mammograms and how often you need them  Vaccines you may need:   · Get an influenza vaccine  every year  The influenza vaccine protects you from the flu  Several types of viruses cause the flu  The viruses change over time, so new vaccines are made each year  · Get a tetanus-diphtheria (Td) booster vaccine  every 10 years  This vaccine protects you against tetanus and diphtheria  Tetanus is a severe infection that may cause painful muscle spasms and lockjaw  Diphtheria is a severe bacterial infection that causes a thick covering in the back of your mouth and throat  · Get a human papillomavirus (HPV) vaccine  if you are female and aged 23 to 32 or male 23 to 24 and never received it  This vaccine protects you from HPV infection  HPV is the most common infection spread by sexual contact  HPV may also cause vaginal, penile, and anal cancers  · Get a pneumococcal vaccine  if you are aged 72 years or older  The pneumococcal vaccine is an injection given to protect you from pneumococcal disease  Pneumococcal disease is an infection caused by pneumococcal bacteria  The infection may cause pneumonia, meningitis, or an ear infection  · Get a shingles vaccine  if you are aged 61 or older, even if you have had shingles before  The shingles vaccine is an injection to protect you from the varicella-zoster virus   This is the same virus that causes chickenpox  Shingles is a painful rash that develops in people who had chickenpox or have been exposed to the virus  How to eat healthy:  My Plate is a model for planning healthy meals  It shows the types and amounts of foods that should go on your plate  Fruits and vegetables make up about half of your plate, and grains and protein make up the other half  A serving of dairy is included on the side of your plate  The amount of calories and serving sizes you need depends on your age, gender, weight, and height  Examples of healthy foods are listed below:  · Eat a variety of vegetables  such as dark green, red, and orange vegetables  You can also include canned vegetables low in sodium (salt) and frozen vegetables without added butter or sauces  · Eat a variety of fresh fruits , canned fruit in 100% juice, frozen fruit, and dried fruit  · Include whole grains  At least half of the grains you eat should be whole grains  Examples include whole-wheat bread, wheat pasta, brown rice, and whole-grain cereals such as oatmeal     · Eat a variety of protein foods such as seafood (fish and shellfish), lean meat, and poultry without skin (turkey and chicken)  Examples of lean meats include pork leg, shoulder, or tenderloin, and beef round, sirloin, tenderloin, and extra lean ground beef  Other protein foods include eggs and egg substitutes, beans, peas, soy products, nuts, and seeds  · Choose low-fat dairy products such as skim or 1% milk or low-fat yogurt, cheese, and cottage cheese  · Limit unhealthy fats  such as butter, hard margarine, and shortening  Exercise:  Exercise at least 30 minutes per day on most days of the week  Some examples of exercise include walking, biking, dancing, and swimming  You can also fit in more physical activity by taking the stairs instead of the elevator or parking farther away from stores   Include muscle strengthening activities 2 days each week  Regular exercise provides many health benefits  It helps you manage your weight, and decreases your risk for type 2 diabetes, heart disease, stroke, and high blood pressure  Exercise can also help improve your mood  Ask your healthcare provider about the best exercise plan for you  General health and safety guidelines:   · Do not smoke  Nicotine and other chemicals in cigarettes and cigars can cause lung damage  Ask your healthcare provider for information if you currently smoke and need help to quit  E-cigarettes or smokeless tobacco still contain nicotine  Talk to your healthcare provider before you use these products  · Limit alcohol  A drink of alcohol is 12 ounces of beer, 5 ounces of wine, or 1½ ounces of liquor  · Lose weight, if needed  Being overweight increases your risk of certain health conditions  These include heart disease, high blood pressure, type 2 diabetes, and certain types of cancer  · Protect your skin  Do not sunbathe or use tanning beds  Use sunscreen with a SPF 15 or higher  Apply sunscreen at least 15 minutes before you go outside  Reapply sunscreen every 2 hours  Wear protective clothing, hats, and sunglasses when you are outside  · Drive safely  Always wear your seatbelt  Make sure everyone in your car wears a seatbelt  A seatbelt can save your life if you are in an accident  Do not use your cell phone when you are driving  This could distract you and cause an accident  Pull over if you need to make a call or send a text message  · Practice safe sex  Use latex condoms if are sexually active and have more than one partner  Your healthcare provider may recommend screening tests for sexually transmitted infections (STIs)  · Wear helmets, lifejackets, and protective gear  Always wear a helmet when you ride a bike or motorcycle, go skiing, or play sports that could cause a head injury  Wear protective equipment when you play sports   Wear a lifejacket when you are on a boat or doing water sports  © 2017 [address] Information is for End User's use only and may not be sold, redistributed or otherwise used for commercial purposes  All illustrations and images included in CareNotes® are the copyrighted property of A D A M , Inc  or Lei Moses  The above information is an  only  It is not intended as medical advice for individual conditions or treatments  Talk to your doctor, nurse or pharmacist before following any medical regimen to see if it is safe and effective for you  Obesity   AMBULATORY CARE:   Obesity  is when your body mass index (BMI) is greater than 30  Your healthcare provider will use your height and weight to measure your BMI  The risks of obesity include  many health problems, such as injuries or physical disability  You may need tests to check for the following:  · Diabetes     · High blood pressure or high cholesterol     · Heart disease     · Gallbladder or liver disease     · Cancer of the colon, breast, prostate, liver, or kidney     · Sleep apnea     · Arthritis or gout  Seek care immediately if:   · You have a severe headache, confusion, or difficulty speaking  · You have weakness on one side of your body  · You have chest pain, sweating, or shortness of breath  Contact your healthcare provider if:   · You have symptoms of gallbladder or liver disease, such as pain in your upper abdomen  · You have knee or hip pain and discomfort while walking  · You have symptoms of diabetes, such as intense hunger and thirst, and frequent urination  · You have symptoms of sleep apnea, such as snoring or daytime sleepiness  · You have questions or concerns about your condition or care  Treatment for obesity  focuses on helping you lose weight to improve your health  Even a small decrease in BMI can reduce the risk for many health problems   Your healthcare provider will help you set a weight-loss goal   · Lifestyle changes  are the first step in treating obesity  These include making healthy food choices and getting regular physical activity  Your healthcare provider may suggest a weight-loss program that involves coaching, education, and therapy  · Medicine  may help you lose weight when it is used with a healthy diet and physical activity  · Surgery  can help you lose weight if you are very obese and have other health problems  There are several types of weight-loss surgery  Ask your healthcare provider for more information  Be successful losing weight:   · Set small, realistic goals  An example of a small goal is to walk for 20 minutes 5 days a week  Anther goal is to lose 5% of your body weight  · Tell friends, family members, and coworkers about your goals  and ask for their support  Ask a friend to lose weight with you, or join a weight-loss support group  · Identify foods or triggers that may cause you to overeat , and find ways to avoid them  Remove tempting high-calorie foods from your home and workplace  Place a bowl of fresh fruit on your kitchen counter  If stress causes you to eat, then find other ways to cope with stress  · Keep a diary to track what you eat and drink  Also write down how many minutes of physical activity you do each day  Weigh yourself once a week and record it in your diary  Eating changes: You will need to eat 500 to 1,000 fewer calories each day than you currently eat to lose 1 to 2 pounds a week  The following changes will help you cut calories:  · Eat smaller portions  Use small plates, no larger than 9 inches in diameter  Fill your plate half full of fruits and vegetables  Measure your food using measuring cups until you know what a serving size looks like  · Eat 3 meals and 1 or 2 snacks each day  Plan your meals in advance  Isaiah Puentes and eat at home most of the time  Eat slowly       · Eat fruits and vegetables at every meal   They are low in calories and high in fiber, which makes you feel full  Do not add butter, margarine, or cream sauce to vegetables  Use herbs to season steamed vegetables  · Eat less fat and fewer fried foods  Eat more baked or grilled chicken and fish  These protein sources are lower in calories and fat than red meat  Limit fast food  Dress your salads with olive oil and vinegar instead of bottled dressing  · Limit the amount of sugar you eat  Do not drink sugary beverages  Limit alcohol  Activity changes:  Physical activity is good for your body in many ways  It helps you burn calories and build strong muscles  It decreases stress and depression, and improves your mood  It can also help you sleep better  Talk to your healthcare provider before you begin an exercise program   · Exercise for at least 30 minutes 5 days a week  Start slowly  Set aside time each day for physical activity that you enjoy and that is convenient for you  It is best to do both weight training and an activity that increases your heart rate, such as walking, bicycling, or swimming  · Find ways to be more active  Do yard work and housecleaning  Walk up the stairs instead of using elevators  Spend your leisure time going to events that require walking, such as outdoor festivals or fairs  This extra physical activity can help you lose weight and keep it off  Follow up with your healthcare provider as directed: You may need to meet with a dietitian  Write down your questions so you remember to ask them during your visits  © 2017 [address] Information is for End User's use only and may not be sold, redistributed or otherwise used for commercial purposes  All illustrations and images included in CareNotes® are the copyrighted property of A D A M , [employer]  or Lei Moses  The above information is an  only  It is not intended as medical advice for individual conditions or treatments   Talk to your doctor, nurse or pharmacist before following any medical regimen to see if it is safe and effective for you

## 2020-09-09 DIAGNOSIS — G43.009 MIGRAINE WITHOUT AURA AND WITHOUT STATUS MIGRAINOSUS, NOT INTRACTABLE: ICD-10-CM

## 2020-09-09 RX ORDER — RIZATRIPTAN BENZOATE 5 MG/1
TABLET ORAL
Qty: 9 TABLET | Refills: 5 | Status: SHIPPED | OUTPATIENT
Start: 2020-09-09 | End: 2020-10-14 | Stop reason: SDUPTHER

## 2020-10-14 DIAGNOSIS — F98.8 ATTENTION DEFICIT DISORDER (ADD) WITHOUT HYPERACTIVITY: Chronic | ICD-10-CM

## 2020-10-14 DIAGNOSIS — G43.009 MIGRAINE WITHOUT AURA AND WITHOUT STATUS MIGRAINOSUS, NOT INTRACTABLE: ICD-10-CM

## 2020-10-14 RX ORDER — METHYLPHENIDATE HYDROCHLORIDE 30 MG/1
30 CAPSULE, EXTENDED RELEASE ORAL DAILY
Qty: 30 CAPSULE | Refills: 0 | Status: SHIPPED | OUTPATIENT
Start: 2020-10-14 | End: 2020-11-27 | Stop reason: SDUPTHER

## 2020-10-14 RX ORDER — RIZATRIPTAN BENZOATE 5 MG/1
5 TABLET ORAL ONCE AS NEEDED
Qty: 9 TABLET | Refills: 5 | Status: SHIPPED | OUTPATIENT
Start: 2020-10-14 | End: 2020-11-27 | Stop reason: SDUPTHER

## 2020-11-08 DIAGNOSIS — Z30.41 SURVEILLANCE FOR BIRTH CONTROL, ORAL CONTRACEPTIVES: ICD-10-CM

## 2020-11-13 RX ORDER — NORGESTIMATE AND ETHINYL ESTRADIOL 0.25-0.035
1 KIT ORAL DAILY
Qty: 28 TABLET | Refills: 9 | Status: SHIPPED | OUTPATIENT
Start: 2020-11-13 | End: 2021-06-03 | Stop reason: SDUPTHER

## 2020-11-27 ENCOUNTER — OFFICE VISIT (OUTPATIENT)
Dept: FAMILY MEDICINE CLINIC | Facility: HOSPITAL | Age: 25
End: 2020-11-27
Payer: COMMERCIAL

## 2020-11-27 VITALS
HEIGHT: 67 IN | HEART RATE: 92 BPM | WEIGHT: 270.8 LBS | DIASTOLIC BLOOD PRESSURE: 74 MMHG | SYSTOLIC BLOOD PRESSURE: 98 MMHG | BODY MASS INDEX: 42.5 KG/M2

## 2020-11-27 DIAGNOSIS — U07.1 COVID-19: ICD-10-CM

## 2020-11-27 DIAGNOSIS — Z23 NEED FOR INFLUENZA VACCINATION: ICD-10-CM

## 2020-11-27 DIAGNOSIS — J45.20 MILD INTERMITTENT ASTHMA WITHOUT COMPLICATION: ICD-10-CM

## 2020-11-27 DIAGNOSIS — F98.8 ATTENTION DEFICIT DISORDER (ADD) WITHOUT HYPERACTIVITY: Chronic | ICD-10-CM

## 2020-11-27 DIAGNOSIS — E66.09 CLASS 2 OBESITY DUE TO EXCESS CALORIES WITHOUT SERIOUS COMORBIDITY WITH BODY MASS INDEX (BMI) OF 37.0 TO 37.9 IN ADULT: ICD-10-CM

## 2020-11-27 DIAGNOSIS — G43.009 MIGRAINE WITHOUT AURA AND WITHOUT STATUS MIGRAINOSUS, NOT INTRACTABLE: Primary | ICD-10-CM

## 2020-11-27 PROCEDURE — 90471 IMMUNIZATION ADMIN: CPT

## 2020-11-27 PROCEDURE — 90682 RIV4 VACC RECOMBINANT DNA IM: CPT

## 2020-11-27 PROCEDURE — 99213 OFFICE O/P EST LOW 20 MIN: CPT | Performed by: INTERNAL MEDICINE

## 2020-11-27 RX ORDER — RIZATRIPTAN BENZOATE 10 MG/1
10 TABLET ORAL ONCE AS NEEDED
Qty: 9 TABLET | Refills: 0 | Status: SHIPPED | OUTPATIENT
Start: 2020-11-27

## 2020-11-27 RX ORDER — METHYLPHENIDATE HYDROCHLORIDE 30 MG/1
30 CAPSULE, EXTENDED RELEASE ORAL DAILY
Qty: 30 CAPSULE | Refills: 0 | Status: SHIPPED | OUTPATIENT
Start: 2020-11-27 | End: 2022-03-15 | Stop reason: ALTCHOICE

## 2020-11-27 RX ORDER — ONDANSETRON 4 MG/1
4 TABLET, FILM COATED ORAL EVERY 8 HOURS PRN
Qty: 12 TABLET | Refills: 0 | Status: SHIPPED | OUTPATIENT
Start: 2020-11-27 | End: 2020-11-27 | Stop reason: SDUPTHER

## 2020-11-27 RX ORDER — ONDANSETRON 4 MG/1
4 TABLET, FILM COATED ORAL EVERY 8 HOURS PRN
Qty: 12 TABLET | Refills: 0 | Status: SHIPPED | OUTPATIENT
Start: 2020-11-27 | End: 2022-03-15

## 2020-12-02 ENCOUNTER — TELEPHONE (OUTPATIENT)
Dept: FAMILY MEDICINE CLINIC | Facility: HOSPITAL | Age: 25
End: 2020-12-02

## 2020-12-02 DIAGNOSIS — J45.20 MILD INTERMITTENT ASTHMA WITHOUT COMPLICATION: Primary | ICD-10-CM

## 2020-12-02 DIAGNOSIS — N63.0 BREAST LUMP: ICD-10-CM

## 2020-12-09 ENCOUNTER — TELEPHONE (OUTPATIENT)
Dept: FAMILY MEDICINE CLINIC | Facility: HOSPITAL | Age: 25
End: 2020-12-09

## 2020-12-09 DIAGNOSIS — N63.0 BREAST LUMP: Primary | ICD-10-CM

## 2020-12-10 ENCOUNTER — HOSPITAL ENCOUNTER (OUTPATIENT)
Dept: RADIOLOGY | Facility: HOSPITAL | Age: 25
Discharge: HOME | End: 2020-12-10
Attending: INTERNAL MEDICINE
Payer: COMMERCIAL

## 2020-12-10 DIAGNOSIS — J45.20 MILD INTERMITTENT ASTHMA WITHOUT COMPLICATION: ICD-10-CM

## 2020-12-10 PROCEDURE — 71046 X-RAY EXAM CHEST 2 VIEWS: CPT

## 2020-12-18 ENCOUNTER — HOSPITAL ENCOUNTER (OUTPATIENT)
Dept: ULTRASOUND IMAGING | Facility: CLINIC | Age: 25
Discharge: HOME | End: 2020-12-18
Payer: COMMERCIAL

## 2020-12-18 VITALS — HEIGHT: 66 IN | BODY MASS INDEX: 43.39 KG/M2 | WEIGHT: 270 LBS

## 2020-12-18 DIAGNOSIS — N63.0 BREAST LUMP: ICD-10-CM

## 2020-12-18 PROCEDURE — 76642 ULTRASOUND BREAST LIMITED: CPT

## 2020-12-22 DIAGNOSIS — N63.0 BREAST LUMP: Primary | ICD-10-CM

## 2021-01-04 ENCOUNTER — CONSULT (OUTPATIENT)
Dept: PULMONOLOGY | Facility: HOSPITAL | Age: 26
End: 2021-01-04
Attending: INTERNAL MEDICINE
Payer: COMMERCIAL

## 2021-01-04 ENCOUNTER — LAB (OUTPATIENT)
Dept: LAB | Facility: HOSPITAL | Age: 26
End: 2021-01-04
Attending: INTERNAL MEDICINE
Payer: COMMERCIAL

## 2021-01-04 VITALS
HEIGHT: 66 IN | OXYGEN SATURATION: 96 % | WEIGHT: 277.8 LBS | BODY MASS INDEX: 44.65 KG/M2 | HEART RATE: 93 BPM | DIASTOLIC BLOOD PRESSURE: 80 MMHG | TEMPERATURE: 98.8 F | SYSTOLIC BLOOD PRESSURE: 128 MMHG | RESPIRATION RATE: 18 BRPM

## 2021-01-04 DIAGNOSIS — J45.30 MILD PERSISTENT ASTHMA WITHOUT COMPLICATION: Primary | ICD-10-CM

## 2021-01-04 DIAGNOSIS — J45.30 MILD PERSISTENT ASTHMA WITHOUT COMPLICATION: ICD-10-CM

## 2021-01-04 DIAGNOSIS — J45.20 MILD INTERMITTENT ASTHMA WITHOUT COMPLICATION: ICD-10-CM

## 2021-01-04 DIAGNOSIS — U07.1 COVID-19: ICD-10-CM

## 2021-01-04 DIAGNOSIS — G47.33 OSA (OBSTRUCTIVE SLEEP APNEA): ICD-10-CM

## 2021-01-04 PROBLEM — R06.09 DYSPNEA ON EXERTION: Status: ACTIVE | Noted: 2021-01-04

## 2021-01-04 PROBLEM — R06.00 DYSPNEA ON EXERTION: Status: ACTIVE | Noted: 2021-01-04

## 2021-01-04 LAB
BASOPHILS # BLD AUTO: 0.02 THOUSANDS/ΜL (ref 0–0.1)
BASOPHILS NFR BLD AUTO: 0 % (ref 0–1)
EOSINOPHIL # BLD AUTO: 0.11 THOUSAND/ΜL (ref 0–0.61)
EOSINOPHIL NFR BLD AUTO: 1 % (ref 0–6)
ERYTHROCYTE [DISTWIDTH] IN BLOOD BY AUTOMATED COUNT: 12.9 % (ref 11.6–15.1)
HCT VFR BLD AUTO: 44 % (ref 34.8–46.1)
HGB BLD-MCNC: 14.2 G/DL (ref 11.5–15.4)
IMM GRANULOCYTES # BLD AUTO: 0.04 THOUSAND/UL (ref 0–0.2)
IMM GRANULOCYTES NFR BLD AUTO: 1 % (ref 0–2)
LYMPHOCYTES # BLD AUTO: 2.59 THOUSANDS/ΜL (ref 0.6–4.47)
LYMPHOCYTES NFR BLD AUTO: 29 % (ref 14–44)
MCH RBC QN AUTO: 28.5 PG (ref 26.8–34.3)
MCHC RBC AUTO-ENTMCNC: 32.3 G/DL (ref 31.4–37.4)
MCV RBC AUTO: 88 FL (ref 82–98)
MONOCYTES # BLD AUTO: 0.82 THOUSAND/ΜL (ref 0.17–1.22)
MONOCYTES NFR BLD AUTO: 9 % (ref 4–12)
NEUTROPHILS # BLD AUTO: 5.27 THOUSANDS/ΜL (ref 1.85–7.62)
NEUTS SEG NFR BLD AUTO: 60 % (ref 43–75)
NRBC BLD AUTO-RTO: 0 /100 WBCS
PLATELET # BLD AUTO: 348 THOUSANDS/UL (ref 149–390)
PMV BLD AUTO: 9.4 FL (ref 8.9–12.7)
RBC # BLD AUTO: 4.99 MILLION/UL (ref 3.81–5.12)
WBC # BLD AUTO: 8.85 THOUSAND/UL (ref 4.31–10.16)

## 2021-01-04 PROCEDURE — 85025 COMPLETE CBC W/AUTO DIFF WBC: CPT

## 2021-01-04 PROCEDURE — 82785 ASSAY OF IGE: CPT

## 2021-01-04 PROCEDURE — 36415 COLL VENOUS BLD VENIPUNCTURE: CPT

## 2021-01-04 PROCEDURE — 99244 OFF/OP CNSLTJ NEW/EST MOD 40: CPT | Performed by: INTERNAL MEDICINE

## 2021-01-04 PROCEDURE — 86003 ALLG SPEC IGE CRUDE XTRC EA: CPT

## 2021-01-04 NOTE — PROGRESS NOTES
Pulmonary Outpatient Consultation Note   Ortega Hunter 23 y o  female MRN: [id number]  1/4/2021    Referring provider: Do Cindy Dunaway  [facility],  [address] [place] [address]     Assessment/Plan:      Mild persistent asthma without complication  Patient has a longstanding history of asthma going back to middle school  Over the past few years, she has noticed increased symptoms with poor asthma control  The recent addition of Zakia Amilcar has been beneficial   She is not needing her rescue inhaler therapy on any regular basis  I would like to perform PFTs to have as a baseline  She will also undergo blood work to include CBC with differential and [facility] allergy panel  Dyspnea on exertion  While some of her dyspnea on exertion is related to asthma, I believe her slow weight gain is also a contributing factor  She would benefit from weight loss and patient states that she is going to try getting on an exercise program and diet in an effort to lose some weight    LEE (obstructive sleep apnea)  Patient had sleep apnea as a child and underwent tonsillectomy around the age of 13  She is now exhibiting signs and symptoms suggestive of obstructive sleep apnea, related to her weight gain and crowded posterior oropharynx  I would like to proceed with a home sleep study to confirm the diagnosis of sleep apnea  Patient would be agreeable to wearing CPAP if needed  She would also benefit from weight loss as above  Visit orders:    Diagnoses and all orders for this visit:    Mild persistent asthma without complication  -     Complete PFT with post bronchodilator; Future  -     CBC and differential; Future  -     [facility] Allergy Panel, Adult; Future    COVID-19  -     Ambulatory referral to Pulmonology    Mild intermittent asthma without complication  -     Ambulatory referral to Pulmonology    ELE (obstructive sleep apnea)  -     Home Study;  Future        History of Present Illness   HPI:  Cara Aurelio Castillo is a 23 y o  female who was diagnosed with asthma in middle school  Over the past 1-2 years, she has noticed increased symptoms  Her most significant symptoms are dyspnea on exertion, sometimes associated with wheezing  No significant cough or sputum production  She was seen by her primary care physician and started on [place]  She states that her symptoms are 75% better since taking Breo on a regular basis  She is not needing albuterol as frequently  She will sometimes use it prior to exercising  She has no definitive triggers, but knows that she is allergic to cats  She has a dog and 2 cats at home, but states she keeps her distance from the cats  She has heartburn approximately once per month, but does not require any specific treatment  She has occasional postnasal drip  She has no fevers or chills  Over the past year, she has gained approximately 30 lb and feels this is affected her shortness of breath with exertion  When she was a child, she was diagnosed with sleep apnea  She underwent tonsillectomy around the age of 13  More recently, she has increasing episodes of waking up choking and gasping  She snores heavily  She wakes up with headaches  She has daytime hypersomnolence and often takes naps, when given the opportunity  She is a college student at [employer]  Review of Systems   Constitutional: Positive for fatigue and unexpected weight change  Negative for chills and fever  HENT: Positive for postnasal drip  Negative for sore throat  Eyes: Negative for visual disturbance  Respiratory:        As noted in HPI   Cardiovascular: Negative for chest pain  Gastrointestinal: Negative for abdominal pain, diarrhea and vomiting  Musculoskeletal: Negative for arthralgias  Skin: Negative for rash  Neurological: Negative for headaches  Hematological: Negative for adenopathy  Psychiatric/Behavioral: Negative      All other systems reviewed and are negative          Historical Information   Past Medical History:   Diagnosis Date    Attention and concentration deficit     last assessed: Nov 2, 2016    Status asthmaticus      Past Surgical History:   Procedure Laterality Date    APPENDECTOMY      TONSILLECTOMY      TOOTH EXTRACTION       Family History   Problem Relation Age of Onset    Anxiety disorder Father     Anxiety disorder Paternal Grandfather     Thyroid disease Paternal Grandfather     Throat cancer Paternal Grandfather     No Known Problems Mother     No Known Problems Brother     Breast cancer Maternal Grandmother     No Known Problems Maternal Grandfather     No Known Problems Paternal Grandmother     No Known Problems Maternal Aunt     No Known Problems Paternal Aunt     Substance Abuse Neg Hx     Mental illness Neg Hx        Occupational History:  College student    Social History     Tobacco Use   Smoking Status Never Smoker   Smokeless Tobacco Never Used       Meds/Allergies     Current Outpatient Medications:     ALPRAZolam (XANAX) 0 25 mg tablet, Take 1 tablet (0 25 mg total) by mouth 2 (two) times a day as needed for anxiety (panic attacks), Disp: 20 tablet, Rfl: 0    fluticasone-vilanterol (BREO ELLIPTA) 100-25 mcg/inh inhaler, Inhale 1 puff daily Rinse mouth after use , Disp: 3 Inhaler, Rfl: 1    levalbuterol (XOPENEX HFA) 45 mcg/act inhaler, Inhale 2 puffs every 6 (six) hours as needed for wheezing Intolerant to albuterol, Disp: 1 Inhaler, Rfl: 5    methylphenidate (METADATE CD) 30 MG CR capsule, Take 1 capsule (30 mg total) by mouth dailyMax Daily Amount: 30 mg, Disp: 30 capsule, Rfl: 0    norgestimate-ethinyl estradiol (ORTHO-CYCLEN) 0 25-35 MG-MCG per tablet, Take 1 tablet by mouth daily, Disp: 28 tablet, Rfl: 9    phentermine 30 MG capsule, Take 1 capsule (30 mg total) by mouth every morning, Disp: 30 capsule, Rfl: 2    rizatriptan (MAXALT) 10 MG tablet, Take 1 tablet (10 mg total) by mouth once as needed for migraine for up to 9 doses May repeat in 2 hours if needed, Disp: 9 tablet, Rfl: 0    zolpidem (AMBIEN) 5 mg tablet, Take 1 tablet (5 mg total) by mouth daily at bedtime as needed for sleep, Disp: 30 tablet, Rfl: 0    ondansetron (ZOFRAN) 4 mg tablet, Take 1 tablet (4 mg total) by mouth every 8 (eight) hours as needed for nausea or vomiting for up to 4 days Send to [address] [place] [address], Disp: 12 tablet, Rfl: 0  No Known Allergies    Vitals: Blood pressure 128/80, pulse 93, temperature 98 8 °F (37 1 °C), temperature source Tympanic, resp  rate 18, height 5' 6" (1 676 m), weight 126 kg (277 lb 12 8 oz), SpO2 96 %  , Body mass index is 44 84 kg/m²  Oxygen Therapy  SpO2: 96 %    Physical Exam   Physical Exam  Constitutional:       General: She is not in acute distress  HENT:      Head: Normocephalic  Mouth/Throat:      Pharynx: No oropharyngeal exudate  Comments: Mallampati 4  Eyes:      General: No scleral icterus  Pupils: Pupils are equal, round, and reactive to light  Neck:      Musculoskeletal: Neck supple  Vascular: No JVD  Cardiovascular:      Rate and Rhythm: Normal rate and regular rhythm  Heart sounds: No murmur  Pulmonary:      Effort: No respiratory distress  Breath sounds: No wheezing or rales  Abdominal:      Palpations: Abdomen is soft  Tenderness: There is no abdominal tenderness  Lymphadenopathy:      Cervical: No cervical adenopathy  Skin:     General: Skin is warm and dry  Neurological:      Mental Status: She is alert and oriented to person, place, and time  Psychiatric:         Mood and Affect: Mood normal          Behavior: Behavior normal          Labs: I have personally reviewed pertinent lab results    Lab Results   Component Value Date    WBC 7 90 05/15/2019    HGB 14 5 05/15/2019    HCT 44 3 05/15/2019    MCV 91 05/15/2019     05/15/2019     Lab Results   Component Value Date    GLUCOSE 87 07/23/2015    CALCIUM 8 9 05/15/2019    NA 137 07/23/2015    K 4 0 05/15/2019    CO2 26 05/15/2019     05/15/2019    BUN 10 05/15/2019    CREATININE 0 81 05/15/2019     No results found for: IGE  Lab Results   Component Value Date    ALT 36 05/15/2019    AST 18 05/15/2019    ALKPHOS 76 05/15/2019    BILITOT 0 7 07/23/2015       Imaging and other studies: I have personally reviewed pertinent reports     and I have personally reviewed pertinent films in PACS  Chest x-ray from 12/10/20 shows clear lungs

## 2021-01-04 NOTE — ASSESSMENT & PLAN NOTE
While some of her dyspnea on exertion is related to asthma, I believe her slow weight gain is also a contributing factor    She would benefit from weight loss and patient states that she is going to try getting on an exercise program and diet in an effort to lose some weight

## 2021-01-04 NOTE — ASSESSMENT & PLAN NOTE
Patient had sleep apnea as a child and underwent tonsillectomy around the age of 13  She is now exhibiting signs and symptoms suggestive of obstructive sleep apnea, related to her weight gain and crowded posterior oropharynx  I would like to proceed with a home sleep study to confirm the diagnosis of sleep apnea  Patient would be agreeable to wearing CPAP if needed  She would also benefit from weight loss as above

## 2021-01-04 NOTE — ASSESSMENT & PLAN NOTE
Patient has a longstanding history of asthma going back to middle school  Over the past few years, she has noticed increased symptoms with poor asthma control  The recent addition of [place] Range has been beneficial   She is not needing her rescue inhaler therapy on any regular basis  I would like to perform PFTs to have as a baseline  She will also undergo blood work to include CBC with differential and [facility] allergy panel

## 2021-01-05 LAB
A ALTERNATA IGE QN: <0.1 KUA/I
A FUMIGATUS IGE QN: <0.1 KUA/I
ALLERGEN COMMENT: ABNORMAL
BERMUDA GRASS IGE QN: <0.1 KUA/I
BOXELDER IGE QN: <0.1 KUA/I
C HERBARUM IGE QN: <0.1 KUA/I
CAT DANDER IGE QN: <0.1 KUA/I
CMN PIGWEED IGE QN: <0.1 KUA/I
COMMON RAGWEED IGE QN: <0.1 KUA/I
COTTONWOOD IGE QN: <0.1 KUA/I
D FARINAE IGE QN: <0.1 KUA/I
D PTERONYSS IGE QN: <0.1 KUA/I
DOG DANDER IGE QN: <0.1 KUA/I
LONDON PLANE IGE QN: <0.1 KUA/I
MOUSE URINE PROT IGE QN: <0.1 KUA/I
MT JUNIPER IGE QN: 0.11 KUA/I
MUGWORT IGE QN: <0.1 KUA/I
P NOTATUM IGE QN: <0.1 KUA/I
ROACH IGE QN: 0.11 KUA/I
SHEEP SORREL IGE QN: <0.1 KUA/I
SILVER BIRCH IGE QN: <0.1 KUA/I
TIMOTHY IGE QN: <0.1 KUA/I
TOTAL IGE SMQN RAST: 18.6 KU/L (ref 0–113)
WALNUT IGE QN: <0.1 KUA/I
WHITE ASH IGE QN: <0.1 KUA/I
WHITE ELM IGE QN: <0.1 KUA/I
WHITE MULBERRY IGE QN: <0.1 KUA/I
WHITE OAK IGE QN: <0.1 KUA/I

## 2021-01-20 ENCOUNTER — TELEPHONE (OUTPATIENT)
Dept: SLEEP CENTER | Facility: CLINIC | Age: 26
End: 2021-01-20

## 2021-01-20 NOTE — TELEPHONE ENCOUNTER
----- Message from Alvin Farias MD sent at 1/19/2021 10:41 AM EST -----  Approved  ----- Message -----  From: Cait Tam  Sent: 1/5/2021   7:32 AM EST  To: Sleep Medicine [facility] Provider    This sleep study needs approval      If approved please sign and return to clerical pool  If denied please include reasons why  Also provide alternative testing if warranted  Please sign and return to clerical pool

## 2021-03-01 ENCOUNTER — OFFICE VISIT (OUTPATIENT)
Dept: FAMILY MEDICINE CLINIC | Facility: HOSPITAL | Age: 26
End: 2021-03-01
Payer: COMMERCIAL

## 2021-03-01 VITALS
BODY MASS INDEX: 45.16 KG/M2 | WEIGHT: 281 LBS | DIASTOLIC BLOOD PRESSURE: 76 MMHG | SYSTOLIC BLOOD PRESSURE: 102 MMHG | HEART RATE: 87 BPM | HEIGHT: 66 IN

## 2021-03-01 DIAGNOSIS — J45.30 MILD PERSISTENT ASTHMA WITHOUT COMPLICATION: Primary | ICD-10-CM

## 2021-03-01 DIAGNOSIS — E66.01 CLASS 3 SEVERE OBESITY IN ADULT, UNSPECIFIED BMI, UNSPECIFIED OBESITY TYPE, UNSPECIFIED WHETHER SERIOUS COMORBIDITY PRESENT (HCC): ICD-10-CM

## 2021-03-01 DIAGNOSIS — F98.8 ATTENTION DEFICIT DISORDER (ADD) WITHOUT HYPERACTIVITY: Chronic | ICD-10-CM

## 2021-03-01 DIAGNOSIS — G47.33 OSA (OBSTRUCTIVE SLEEP APNEA): ICD-10-CM

## 2021-03-01 DIAGNOSIS — G43.109 MIGRAINE WITH AURA AND WITHOUT STATUS MIGRAINOSUS, NOT INTRACTABLE: ICD-10-CM

## 2021-03-01 DIAGNOSIS — J45.20 MILD INTERMITTENT ASTHMA WITHOUT COMPLICATION: ICD-10-CM

## 2021-03-01 PROBLEM — R06.09 DYSPNEA ON EXERTION: Status: RESOLVED | Noted: 2021-01-04 | Resolved: 2021-03-01

## 2021-03-01 PROBLEM — G43.009 MIGRAINE WITHOUT AURA AND WITHOUT STATUS MIGRAINOSUS, NOT INTRACTABLE: Status: RESOLVED | Noted: 2020-08-17 | Resolved: 2021-03-01

## 2021-03-01 PROBLEM — E66.9 CLASS 2 OBESITY IN ADULT: Status: RESOLVED | Noted: 2019-06-19 | Resolved: 2021-03-01

## 2021-03-01 PROBLEM — R06.00 DYSPNEA ON EXERTION: Status: RESOLVED | Noted: 2021-01-04 | Resolved: 2021-03-01

## 2021-03-01 PROBLEM — E66.812 CLASS 2 OBESITY IN ADULT: Status: RESOLVED | Noted: 2019-06-19 | Resolved: 2021-03-01

## 2021-03-01 PROBLEM — E66.813 CLASS 3 SEVERE OBESITY IN ADULT (HCC): Status: ACTIVE | Noted: 2019-06-19

## 2021-03-01 PROCEDURE — 99213 OFFICE O/P EST LOW 20 MIN: CPT | Performed by: INTERNAL MEDICINE

## 2021-03-01 RX ORDER — LEVALBUTEROL TARTRATE 45 UG/1
2 AEROSOL, METERED ORAL EVERY 6 HOURS PRN
Qty: 1 INHALER | Refills: 5 | Status: SHIPPED | OUTPATIENT
Start: 2021-03-01 | End: 2021-09-08 | Stop reason: SDUPTHER

## 2021-03-01 RX ORDER — PHENTERMINE HYDROCHLORIDE 30 MG/1
30 CAPSULE ORAL EVERY MORNING
Qty: 30 CAPSULE | Refills: 2 | Status: SHIPPED | OUTPATIENT
Start: 2021-03-01 | End: 2021-09-08 | Stop reason: ALTCHOICE

## 2021-03-01 NOTE — ASSESSMENT & PLAN NOTE
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

## 2021-03-01 NOTE — ASSESSMENT & PLAN NOTE
Has brio for baseline control worse with cold weather- needs refill on her xopenex- had shakiness with albuterol

## 2021-03-01 NOTE — PROGRESS NOTES
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 graduated from [employer] and living out there for now  now working as [employer]- triggered with fluorescent  lights at their home            Other    ADD (attention deficit disorder) (Chronic)    Relevant Medications    phentermine 30 MG capsule    Class 3 severe obesity in adult [facility])    Relevant Medications    phentermine 30 MG capsule      Other Visit Diagnoses     Mild intermittent asthma without complication        Relevant Medications    levalbuterol (Xopenex HFA) 45 mcg/act inhaler            Subjective:      Patient ID: Driss Madrigal is a 23 y o  female    See ruchich   BMI Counseling: Body mass index is 45 35 kg/m²  The BMI is above normal  Nutrition recommendations include 3-5 servings of fruits/vegetables daily and increasing intake of lean protein  Exercise recommendations include exercising 3-5 times per week- walking daily- working out  The following portions of the patient's history were reviewed and updated as appropriate: allergies, current medications and problem list      Review of Systems   Constitutional: Negative for activity change and diaphoresis  HENT: Negative for congestion and postnasal drip  Respiratory: Negative for chest tightness and shortness of breath  Cardiovascular: Negative for chest pain and palpitations     Gastrointestinal: Negative for abdominal distention, constipation, diarrhea and nausea  Genitourinary: To see gyn in June   All other systems reviewed and are negative  Objective:      Current Outpatient Medications:     ALPRAZolam (XANAX) 0 25 mg tablet, Take 1 tablet (0 25 mg total) by mouth 2 (two) times a day as needed for anxiety (panic attacks), Disp: 20 tablet, Rfl: 0    fluticasone-vilanterol (BREO ELLIPTA) 100-25 mcg/inh inhaler, Inhale 1 puff daily Rinse mouth after use , Disp: 3 Inhaler, Rfl: 1    levalbuterol (Xopenex HFA) 45 mcg/act inhaler, Inhale 2 puffs every 6 (six) hours as needed for wheezing Intolerant to albuterol, Disp: 1 Inhaler, Rfl: 5    methylphenidate (METADATE CD) 30 MG CR capsule, Take 1 capsule (30 mg total) by mouth dailyMax Daily Amount: 30 mg, Disp: 30 capsule, Rfl: 0    norgestimate-ethinyl estradiol (ORTHO-CYCLEN) 0 25-35 MG-MCG per tablet, Take 1 tablet by mouth daily, Disp: 28 tablet, Rfl: 9    ondansetron (ZOFRAN) 4 mg tablet, Take 1 tablet (4 mg total) by mouth every 8 (eight) hours as needed for nausea or vomiting for up to 4 days Send to [address] [place] [address], Disp: 12 tablet, Rfl: 0    phentermine 30 MG capsule, Take 1 capsule (30 mg total) by mouth every morning, Disp: 30 capsule, Rfl: 2    rizatriptan (MAXALT) 10 MG tablet, Take 1 tablet (10 mg total) by mouth once as needed for migraine for up to 9 doses May repeat in 2 hours if needed, Disp: 9 tablet, Rfl: 0    zolpidem (AMBIEN) 5 mg tablet, Take 1 tablet (5 mg total) by mouth daily at bedtime as needed for sleep, Disp: 30 tablet, Rfl: 0    Blood pressure 102/76, pulse 87, height 5' 6" (1 676 m), weight 127 kg (281 lb)  Physical Exam  Vitals signs and nursing note reviewed  Constitutional:       General: She is not in acute distress  Appearance: She is well-developed  HENT:      Head: Normocephalic and atraumatic        Right Ear: Tympanic membrane normal       Left Ear: Tympanic membrane normal  Nose: No congestion  Eyes:      General:         Right eye: No discharge  Left eye: No discharge  Conjunctiva/sclera: Conjunctivae normal    Neck:      Musculoskeletal: Neck supple  Cardiovascular:      Rate and Rhythm: Normal rate and regular rhythm  Heart sounds: No murmur  Pulmonary:      Effort: Pulmonary effort is normal  No respiratory distress  Breath sounds: Normal breath sounds  No wheezing or rhonchi  Abdominal:      General: Abdomen is flat  Palpations: Abdomen is soft  Tenderness: There is no abdominal tenderness  Comments: Obese, nondistended   Musculoskeletal:         General: No swelling or tenderness  Skin:     General: Skin is warm and dry  Neurological:      General: No focal deficit present  Mental Status: She is alert and oriented to person, place, and time  Cranial Nerves: No cranial nerve deficit  Motor: No weakness     Psychiatric:         Mood and Affect: Mood normal

## 2021-03-26 ENCOUNTER — TELEPHONE (OUTPATIENT)
Dept: SLEEP CENTER | Facility: CLINIC | Age: 26
End: 2021-03-26

## 2021-03-26 NOTE — TELEPHONE ENCOUNTER
----- Message from Sheyla Tello DO sent at 3/26/2021  2:05 PM EDT -----  approved  ----- Message -----  From: Alison Mercedes  Sent: 3/25/2021  10:01 AM EDT  To: Sleep Medicine [place] Provider    This sleep study needs approval      If approved please sign and return to clerical pool  If denied please include reasons why  Also provide alternative testing if warranted  Please sign and return to clerical pool

## 2021-04-09 ENCOUNTER — CLINICAL SUPPORT (OUTPATIENT)
Dept: NUTRITION | Facility: HOSPITAL | Age: 26
End: 2021-04-09
Attending: INTERNAL MEDICINE
Payer: COMMERCIAL

## 2021-04-09 VITALS — BODY MASS INDEX: 45.23 KG/M2 | WEIGHT: 280.2 LBS

## 2021-04-09 DIAGNOSIS — E66.01 CLASS 3 SEVERE OBESITY IN ADULT, UNSPECIFIED BMI, UNSPECIFIED OBESITY TYPE, UNSPECIFIED WHETHER SERIOUS COMORBIDITY PRESENT (HCC): ICD-10-CM

## 2021-04-09 PROCEDURE — 97802 MEDICAL NUTRITION INDIV IN: CPT

## 2021-04-09 NOTE — PROGRESS NOTES
Initial Nutrition Assessment Form    Patient Name: Ani Del Rosario    YOB: 1995    Sex: Female     Assessment Date: 4/9/2021  Start Time: 1005 Stop Time: 1105 Total Minutes: 61     Data:  Present at session: self   Parent/Patient Concerns: "I can't lose weight"   Medical Dx/Reason for Referral:  obesity   Past Medical History:   Diagnosis Date    Attention and concentration deficit     last assessed: Nov 2, 2016    Status asthmaticus        Current Outpatient Medications   Medication Sig Dispense Refill    ALPRAZolam (XANAX) 0 25 mg tablet Take 1 tablet (0 25 mg total) by mouth 2 (two) times a day as needed for anxiety (panic attacks) 20 tablet 0    fluticasone-vilanterol (BREO ELLIPTA) 100-25 mcg/inh inhaler Inhale 1 puff daily Rinse mouth after use  3 Inhaler 1    levalbuterol (Xopenex HFA) 45 mcg/act inhaler Inhale 2 puffs every 6 (six) hours as needed for wheezing Intolerant to albuterol 1 Inhaler 5    methylphenidate (METADATE CD) 30 MG CR capsule Take 1 capsule (30 mg total) by mouth dailyMax Daily Amount: 30 mg 30 capsule 0    norgestimate-ethinyl estradiol (ORTHO-CYCLEN) 0 25-35 MG-MCG per tablet Take 1 tablet by mouth daily 28 tablet 9    ondansetron (ZOFRAN) 4 mg tablet Take 1 tablet (4 mg total) by mouth every 8 (eight) hours as needed for nausea or vomiting for up to 4 days Send to [address] [place] [address] 12 tablet 0    phentermine 30 MG capsule Take 1 capsule (30 mg total) by mouth every morning 30 capsule 2    rizatriptan (MAXALT) 10 MG tablet Take 1 tablet (10 mg total) by mouth once as needed for migraine for up to 9 doses May repeat in 2 hours if needed 9 tablet 0    zolpidem (AMBIEN) 5 mg tablet Take 1 tablet (5 mg total) by mouth daily at bedtime as needed for sleep 30 tablet 0     No current facility-administered medications for this visit           Additional Meds/Supplements: Mvi; hair/nails, probiotic   Special Learning Needs: n/a   Height: HC Readings from Last 3 Encounters:   No data found for [facility]       Weight: Wt Readings from Last 10 Encounters:   04/09/21 127 kg (280 lb 3 2 oz)   03/01/21 127 kg (281 lb)   01/04/21 126 kg (277 lb 12 8 oz)   12/18/20 122 kg (270 lb)   11/27/20 123 kg (270 lb 12 8 oz)   08/17/20 119 kg (263 lb 3 2 oz)   07/23/20 118 kg (261 lb)   05/29/20 119 kg (261 lb 9 6 oz)   03/13/20 118 kg (260 lb)   03/12/20 118 kg (260 lb)     Estimated body mass index is 45 23 kg/m² as calculated from the following:    Height as of 3/1/21: 5' 6" (1 676 m)  Weight as of this encounter: 127 kg (280 lb 3 2 oz)  Recent Weight Change: [x]Yes     []No  Amount: Gradual increase      Energy Needs: 3175kcals (25kcals/kg)  - 1000 kcals (for 2#/wk wt loss)= 2175kcals   No Known Allergies NKFA   Social History     Substance and Sexual Activity   Alcohol Use Yes    Frequency: Monthly or less    Drinks per session: 1 or 2    Binge frequency: Monthly    2x/month 1 drink wine or mixed drink   Social History     Tobacco Use   Smoking Status Never Smoker   Smokeless Tobacco Never Used    n/a   Who shops? patient   Who cooks? patient   Exercise: Walking with 8 mos pushing strolling x 20  mins good pace; workouts 2-3x/wk 15 mins youtube video burpees pushups mountain climbers and lunges/side lunges    Prior Counseling? []Yes     [x]No  When:      Why:         Diet Hx: eat out 2x/wk fast food- cheeseburger x 1-2 (1x/month will get 2) small tapia small soda  Breakfast: protein shake -equate brand 9gms protein 220kcals 8-10oz (just started); 1-2 eggs with or without bagel thin OJ or nothing (no coffee)    630  a m  skipping 4x/wk at least   Lunch:  S/w [place] and cheese mustard with regular soda regular white bread applesauce or finish bag of chips (from snack); microwave frozen meals 280-400 cals soda 3-4x/wk or water  1   p m           Dinner:  Black bean burger and broccoli (no roll) water; sushi ; BK or ERICH [employer] in one week but rare (week before cycle started); grilled chicken and rice, hot dogs and mozzarella sticks    5-6  p m  Snacks: AM - 10 celery and cream cheese or single serving chip -dorito/potato regular-water  PM - n/a  HS -         Nutrition Diagnosis:   Overweight/obesity  related to Excess energy intake as evidenced by  Weight for height more than normative standard for age and sex       Medical Nutrition Therapy Intervention:  []Individualized Meal Plan []Understanding Lab Values   []Basic Pathophysiology of Disease []Food/Medication Interactions   [x]Food Diary-myfitnesspal daily until next visit [x]Exercise   [x]Lifestyle/Behavior Modification Techniques- adequate sleep 7-9 hours as able []Medication, Mechanism of Action   []Label Reading []Self Blood Glucose Monitoring   [x]Weight/BMI Goals- wants to lose weight, 170# long term goal (last weighed in high school) [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 Notes:  Just graduated from college will be  Bed 11-12 at night asleep right away (in bed at 10-tv); waking up 6 us  Does not wake up in the night; naps 1x/wk feels tired daily       Comprehension: []Excellent  []Very Good  [x]Good  []Fair   []Poor    Receptivity: []Excellent  []Very Good  [x]Good  []Fair   []Poor    Expected Compliance: []Excellent  []Very Good  [x]Good  []Fair   []Poor        Goals:  1   3 meals a day no skipping   2  Minimize/eliminate drinks with caloreis   3 portions per plate method as discussed with RD       No follow-ups on file    Labs:  CMP  Lab Results   Component Value Date     07/23/2015    K 4 0 05/15/2019     05/15/2019    CO2 26 05/15/2019    ANIONGAP 7 07/23/2015    BUN 10 05/15/2019    CREATININE 0 81 05/15/2019    GLUCOSE 87 07/23/2015    GLUF 87 05/15/2019    CALCIUM 8 9 05/15/2019    AST 18 05/15/2019    ALT 36 05/15/2019    ALKPHOS 76 05/15/2019    PROT 6 7 07/23/2015    BILITOT 0 7 07/23/2015    EGFR 104 05/15/2019       BMP  Lab Results   Component Value Date    GLUCOSE 87 07/23/2015    CALCIUM 8 9 05/15/2019     07/23/2015    K 4 0 05/15/2019    CO2 26 05/15/2019     05/15/2019    BUN 10 05/15/2019    CREATININE 0 81 05/15/2019       Lipids  No results found for: CHOL  No results found for: HDL  No results found for: LDLCALC  No results found for: TRIG  No results found for: CHOLHDL    Hemoglobin A1C  No results found for: HGBA1C    Fasting Glucose  Lab Results   Component Value Date    GLUF 87 05/15/2019       Insulin     Thyroid  No results found for: TSH, F5GRAJE, P6QASJI, THYROIDAB    Hepatic Function Panel  Lab Results   Component Value Date    ALT 36 05/15/2019    AST 18 05/15/2019    ALKPHOS 76 05/15/2019    BILITOT 0 7 07/23/2015       Celiac Disease Antibody Panel  No results found for: ENDOMYSIAL IGA, GLIADIN IGA, GLIADIN IGG, IGA, TISSUE TRANSGLUT AB, TTG IGA   Iron  No results found for: IRON, TIBC, FERRITIN    Vitamins  No results found for: VITAMIN B2   No results found for: NICOTINAMIDE, NICOTINIC ACID   No results found for: VITAMINB6  No results found for: [id number]  No results found for: VITB5  No results found for: D4AHKFNL  No results found for: THYROGLB  No results found for: VITAMIN K   No results found for: 25-HYDROXY VIT D   No components found for: VITAMINE     Fritzon MS RD LDN  [address]

## 2021-04-23 ENCOUNTER — HOSPITAL ENCOUNTER (OUTPATIENT)
Dept: SLEEP CENTER | Facility: CLINIC | Age: 26
Discharge: HOME | End: 2021-04-23
Attending: INTERNAL MEDICINE
Payer: COMMERCIAL

## 2021-04-23 DIAGNOSIS — G47.33 OSA (OBSTRUCTIVE SLEEP APNEA): ICD-10-CM

## 2021-04-23 PROCEDURE — G0399 HOME SLEEP TEST/TYPE 3 PORTA: HCPCS

## 2021-04-23 PROCEDURE — G0399 HOME SLEEP TEST/TYPE 3 PORTA: HCPCS | Performed by: INTERNAL MEDICINE

## 2021-04-24 NOTE — PROGRESS NOTES
Home Sleep Study Documentation    Pre-Sleep Home Study:    Set-up and instructions performed by: MIKE    Technician performed demonstration for Patient: yes    Return demonstration performed by Patient: yes    Written instructions provided to Patient: yes    Patient signed consent form: yes        Post-Sleep Home Study:    Additional comments by Patient:         Home Sleep Study Failed:no:    Failure reason: N/A    Reported or Detected: N/A     Scored by: VENITA Simental Se

## 2021-05-19 ENCOUNTER — CLINICAL SUPPORT (OUTPATIENT)
Dept: NUTRITION | Facility: HOSPITAL | Age: 26
End: 2021-05-19
Payer: COMMERCIAL

## 2021-05-19 VITALS — BODY MASS INDEX: 45.29 KG/M2 | WEIGHT: 280.6 LBS

## 2021-05-19 DIAGNOSIS — E66.01 MORBID OBESITY (HCC): Primary | ICD-10-CM

## 2021-05-19 PROCEDURE — 97803 MED NUTRITION INDIV SUBSEQ: CPT

## 2021-05-19 NOTE — PROGRESS NOTES
Follow-Up Nutrition Assessment Form    Patient Name: Jessa Giron    YOB: 1995    Sex: Female      Follow Up Date: 5/19/2021  Start Time: 0 Stop Time: 3 Total Minutes: 30     Data:  Present at session: self   Parent/Patient Concerns: n/a   Medical Dx/Reason for Referral: obesity   Past Medical History:   Diagnosis Date    Attention and concentration deficit     last assessed: Nov 2, 2016    Status asthmaticus        Current Outpatient Medications   Medication Sig Dispense Refill    ALPRAZolam (XANAX) 0 25 mg tablet Take 1 tablet (0 25 mg total) by mouth 2 (two) times a day as needed for anxiety (panic attacks) 20 tablet 0    fluticasone-vilanterol (BREO ELLIPTA) 100-25 mcg/inh inhaler Inhale 1 puff daily Rinse mouth after use  3 Inhaler 1    levalbuterol (Xopenex HFA) 45 mcg/act inhaler Inhale 2 puffs every 6 (six) hours as needed for wheezing Intolerant to albuterol 1 Inhaler 5    methylphenidate (METADATE CD) 30 MG CR capsule Take 1 capsule (30 mg total) by mouth dailyMax Daily Amount: 30 mg 30 capsule 0    norgestimate-ethinyl estradiol (ORTHO-CYCLEN) 0 25-35 MG-MCG per tablet Take 1 tablet by mouth daily 28 tablet 9    ondansetron (ZOFRAN) 4 mg tablet Take 1 tablet (4 mg total) by mouth every 8 (eight) hours as needed for nausea or vomiting for up to 4 days Send to [address] [place] [address] 12 tablet 0    phentermine 30 MG capsule Take 1 capsule (30 mg total) by mouth every morning 30 capsule 2    rizatriptan (MAXALT) 10 MG tablet Take 1 tablet (10 mg total) by mouth once as needed for migraine for up to 9 doses May repeat in 2 hours if needed 9 tablet 0    zolpidem (AMBIEN) 5 mg tablet Take 1 tablet (5 mg total) by mouth daily at bedtime as needed for sleep 30 tablet 0     No current facility-administered medications for this visit           Additional Meds/Supplements: n/a   Barriers to Learning: None   Labs: n/a   Height: Ht Readings from Last 3 Encounters:   03/01/21 5' 6" (1 676 m)   01/04/21 5' 6" (1 676 m)   12/18/20 5' 6" (1 676 m)      Weight: Wt Readings from Last 10 Encounters:   05/19/21 127 kg (280 lb 9 6 oz)   04/09/21 127 kg (280 lb 3 2 oz)   03/01/21 127 kg (281 lb)   01/04/21 126 kg (277 lb 12 8 oz)   12/18/20 122 kg (270 lb)   11/27/20 123 kg (270 lb 12 8 oz)   08/17/20 119 kg (263 lb 3 2 oz)   07/23/20 118 kg (261 lb)   05/29/20 119 kg (261 lb 9 6 oz)   03/13/20 118 kg (260 lb)     Estimated body mass index is 45 29 kg/m² as calculated from the following:    Height as of 3/1/21: 5' 6" (1 676 m)  Weight as of this encounter: 127 kg (280 lb 9 6 oz)  Wt  Change Since Last Visit: []Yes     [x]No  Amount:       Energy Needs: No calculations performed for this visit   Pain Screen: Are you having pain now? No      Goals Achieved: regular meals, decreased drinks with calories, good sleep     New Goals:   1  Activity 20-30mins daily   2  Continue meal tracking   3  Initial PES:   Overweight/obesity  related to Excess energy intake as evidenced by  Weight for height more than normative standard for age and sex    New PES: No Change      New Problem List:  1 none new   2    3        Assessment:  Wt is stable, is calorie counting is at 2000kcals daily  Has moved back home with her mom and son  Is using the [employer] ap to track calories  Eating 3 meals a day no skipping  Minimal snacking, did slack off when she was moving  Sleeping well in bed by 9-10, and awake at 830-9am, energy is good during the day  Minimal stress  Activity has now picked up recently week or so, by increasing activity around the house, shampooing carpets and mowing the lawn  Does walk the dog almost every other day, [id number]  Watching portion sizes for meals and snacks  Has increased water intake and decreased food intake  Has minimized drinks with calories         Medical Nutrition Therapy Intervention:  []Individualized Meal Plan []Understanding Lab Values   []Basic Pathophysiology of Disease []Food/Medication Interactions   []Food Diary [x]Exercise-30mins 5-7 days a week   []Lifestyle/Behavior Modification Techniques []Medication, Mechanism of Action   []Label Reading []Self Blood Glucose Monitoring   [x]Weight/BMI Goals-wants to lose; 170# long term goal; 3-4# by next visit [x]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)   Other Notes: was living in [place] going to school, now back home, looking for a job        Comprehension: []Excellent  []Very Good  [x]Good  []Fair   []Poor    Receptivity: []Excellent  []Very Good  [x]Good  []Fair   []Poor    Expected Compliance: []Excellent  []Very Good  [x]Good  []Fair   []Poor      Labs:  CMP  Lab Results   Component Value Date     07/23/2015    K 4 0 05/15/2019     05/15/2019    CO2 26 05/15/2019    ANIONGAP 7 07/23/2015    BUN 10 05/15/2019    CREATININE 0 81 05/15/2019    GLUCOSE 87 07/23/2015    GLUF 87 05/15/2019    CALCIUM 8 9 05/15/2019    AST 18 05/15/2019    ALT 36 05/15/2019    ALKPHOS 76 05/15/2019    PROT 6 7 07/23/2015    BILITOT 0 7 07/23/2015    EGFR 104 05/15/2019       BMP  Lab Results   Component Value Date    GLUCOSE 87 07/23/2015    CALCIUM 8 9 05/15/2019     07/23/2015    K 4 0 05/15/2019    CO2 26 05/15/2019     05/15/2019    BUN 10 05/15/2019    CREATININE 0 81 05/15/2019       Lipids  No results found for: CHOL  No results found for: HDL  No results found for: LDLCALC  No results found for: TRIG  No results found for: CHOLHDL    Hemoglobin A1C  No results found for: HGBA1C    Fasting Glucose  Lab Results   Component Value Date    GLUF 87 05/15/2019       Insulin     Thyroid  No results found for: TSH, G5DCYCX, J2MOXNB, THYROIDAB    Hepatic Function Panel  Lab Results   Component Value Date    ALT 36 05/15/2019    AST 18 05/15/2019    ALKPHOS 76 05/15/2019    BILITOT 0 7 07/23/2015       Celiac Disease Antibody Panel  No results found for: ENDOMYSIAL IGA, GLIADIN IGA, GLIADIN IGG, IGA, TISSUE TRANSGLUT AB, TTG IGA   Iron  No results found for: IRON, TIBC, FERRITIN    Vitamins  No results found for: VITAMIN B2   No results found for: NICOTINAMIDE, NICOTINIC ACID   No results found for: VITAMINB6  No results found for: [id number]  No results found for: VITB5  No results found for: P2EHFRDW  No results found for: THYROGLB  No results found for: VITAMIN K   No results found for: 25-HYDROXY VIT D   No components found for: VITAMINE     No follow-ups on file      [id number] [address] [place] [address] 70  [address] [address] [facility] [id number]

## 2021-06-03 DIAGNOSIS — Z30.41 SURVEILLANCE FOR BIRTH CONTROL, ORAL CONTRACEPTIVES: ICD-10-CM

## 2021-06-03 RX ORDER — NORGESTIMATE AND ETHINYL ESTRADIOL 0.25-0.035
1 KIT ORAL DAILY
Qty: 28 TABLET | Refills: 1 | Status: SHIPPED | OUTPATIENT
Start: 2021-06-03 | End: 2021-09-08 | Stop reason: ALTCHOICE

## 2021-06-12 ENCOUNTER — IMMUNIZATIONS (OUTPATIENT)
Dept: FAMILY MEDICINE CLINIC | Facility: HOSPITAL | Age: 26
End: 2021-06-12

## 2021-06-12 DIAGNOSIS — Z23 ENCOUNTER FOR IMMUNIZATION: Primary | ICD-10-CM

## 2021-06-12 PROCEDURE — 0001A SARS-COV-2 / COVID-19 MRNA VACCINE (PFIZER-BIONTECH) 30 MCG: CPT

## 2021-06-12 PROCEDURE — 91300 SARS-COV-2 / COVID-19 MRNA VACCINE (PFIZER-BIONTECH) 30 MCG: CPT

## 2021-06-22 ENCOUNTER — CLINICAL SUPPORT (OUTPATIENT)
Dept: NUTRITION | Facility: HOSPITAL | Age: 26
End: 2021-06-22
Payer: COMMERCIAL

## 2021-06-22 VITALS — BODY MASS INDEX: 45.65 KG/M2 | WEIGHT: 282.8 LBS

## 2021-06-22 DIAGNOSIS — E66.01 MORBID OBESITY (HCC): Primary | ICD-10-CM

## 2021-06-22 PROCEDURE — 97803 MED NUTRITION INDIV SUBSEQ: CPT

## 2021-06-22 NOTE — PROGRESS NOTES
Follow-Up Nutrition Assessment Form    Patient Name: Mery Carcamo    YOB: 1995    Sex: Female      Follow Up Date: 6/22/2021  Start Time: 1 Stop Time: 130 Total Minutes: 30     Data:  Present at session: self   Parent/Patient Concerns: n/a   Medical Dx/Reason for Referral: obesity   Past Medical History:   Diagnosis Date    Attention and concentration deficit     last assessed: Nov 2, 2016    Status asthmaticus        Current Outpatient Medications   Medication Sig Dispense Refill    ALPRAZolam (XANAX) 0 25 mg tablet Take 1 tablet (0 25 mg total) by mouth 2 (two) times a day as needed for anxiety (panic attacks) 20 tablet 0    fluticasone-vilanterol (BREO ELLIPTA) 100-25 mcg/inh inhaler Inhale 1 puff daily Rinse mouth after use  3 Inhaler 1    levalbuterol (Xopenex HFA) 45 mcg/act inhaler Inhale 2 puffs every 6 (six) hours as needed for wheezing Intolerant to albuterol 1 Inhaler 5    methylphenidate (METADATE CD) 30 MG CR capsule Take 1 capsule (30 mg total) by mouth dailyMax Daily Amount: 30 mg 30 capsule 0    norgestimate-ethinyl estradiol (ORTHO-CYCLEN) 0 25-35 MG-MCG per tablet Take 1 tablet by mouth daily 28 tablet 1    ondansetron (ZOFRAN) 4 mg tablet Take 1 tablet (4 mg total) by mouth every 8 (eight) hours as needed for nausea or vomiting for up to 4 days Send to [address] [place] [address] 12 tablet 0    phentermine 30 MG capsule Take 1 capsule (30 mg total) by mouth every morning 30 capsule 2    rizatriptan (MAXALT) 10 MG tablet Take 1 tablet (10 mg total) by mouth once as needed for migraine for up to 9 doses May repeat in 2 hours if needed 9 tablet 0    zolpidem (AMBIEN) 5 mg tablet Take 1 tablet (5 mg total) by mouth daily at bedtime as needed for sleep 30 tablet 0     No current facility-administered medications for this visit          Additional Meds/Supplements: n/a   Barriers to Learning: None   Labs: n/a   Height: Ht Readings from Last 3 Encounters:   03/01/21 5' 6" (1 676 m)   01/04/21 5' 6" (1 676 m)   12/18/20 5' 6" (1 676 m)      Weight: Wt Readings from Last 10 Encounters:   06/22/21 128 kg (282 lb 12 8 oz)   05/19/21 127 kg (280 lb 9 6 oz)   04/09/21 127 kg (280 lb 3 2 oz)   03/01/21 127 kg (281 lb)   01/04/21 126 kg (277 lb 12 8 oz)   12/18/20 122 kg (270 lb)   11/27/20 123 kg (270 lb 12 8 oz)   08/17/20 119 kg (263 lb 3 2 oz)   07/23/20 118 kg (261 lb)   05/29/20 119 kg (261 lb 9 6 oz)     Estimated body mass index is 45 65 kg/m² as calculated from the following:    Height as of 3/1/21: 5' 6" (1 676 m)  Weight as of this encounter: 128 kg (282 lb 12 8 oz)  Wt  Change Since Last Visit: [x]Yes     []No  Amount: 3# gain not desired      Energy Needs: No calculations performed for this visit   Pain Screen: Are you having pain now? No      Goals Achieved: regular meals, decreased drinks with calories, good sleep     New Goals:   1  Activity 20-30mins daily   2  Continue meal tracking   3  Initial PES:   Overweight/obesity  related to Excess energy intake as evidenced by  Weight for height more than normative standard for age and sex    New PES: No Change      New Problem List:  1 none new   2    3        Assessment:  Wt is up slightly but starts cycle next week so may be gaining for that, is calorie counting is at 2000kcals daily but soemtimes does not even reach that amount  Has moved back home with her mom and son  Is using the [employer] ap to track calories  Eating 3 meals a day no skipping  Minimal snacking, did slack off when she was moving  Has 2 small snacks and one meal, sometimes a microwave lunch, has not been near as hungry  Sleeping well in bed by 9-10 (now 3-4am), and awake at 830-9am, energy is good during the day  Minimal stress  Activity has now picked up recently week or so, by increasing activity around the house, shampooing carpets and mowing the lawn  Does walk the dog almost every other day, [id number]    Watching portion sizes for meals and snacks  Has increased water intake and decreased food intake  Has minimized drinks with calories (soda 1x/wk max, mostly water daily, snapple every once in a While)         Medical Nutrition Therapy Intervention:  []Individualized Meal Plan []Understanding Lab Values   []Basic Pathophysiology of Disease []Food/Medication Interactions   []Food Diary [x]Exercise-30mins 5-7 days a week-last week went for 3-4 bike rides 30-60mins, and ice skating 1x/wk (just got a bike)    []Lifestyle/Behavior Modification Techniques []Medication, Mechanism of Action   []Label Reading []Self Blood Glucose Monitoring   [x]Weight/BMI Goals-wants to lose; 170# long term goal; 3-4# by next visit [x]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)   Other Notes: was living in [place] going to school, now back home, looking for a job        Comprehension: []Excellent  []Very Good  [x]Good  []Fair   []Poor    Receptivity: []Excellent  []Very Good  [x]Good  []Fair   []Poor    Expected Compliance: []Excellent  []Very Good  [x]Good  []Fair   []Poor      Labs:  CMP  Lab Results   Component Value Date     07/23/2015    K 4 0 05/15/2019     05/15/2019    CO2 26 05/15/2019    ANIONGAP 7 07/23/2015    BUN 10 05/15/2019    CREATININE 0 81 05/15/2019    GLUCOSE 87 07/23/2015    GLUF 87 05/15/2019    CALCIUM 8 9 05/15/2019    AST 18 05/15/2019    ALT 36 05/15/2019    ALKPHOS 76 05/15/2019    PROT 6 7 07/23/2015    BILITOT 0 7 07/23/2015    EGFR 104 05/15/2019       BMP  Lab Results   Component Value Date    GLUCOSE 87 07/23/2015    CALCIUM 8 9 05/15/2019     07/23/2015    K 4 0 05/15/2019    CO2 26 05/15/2019     05/15/2019    BUN 10 05/15/2019    CREATININE 0 81 05/15/2019       Lipids  No results found for: CHOL  No results found for: HDL  No results found for: LDLCALC  No results found for: TRIG  No results found for: CHOLHDL    Hemoglobin A1C  No results found for: HGBA1C    Fasting Glucose  Lab Results   Component Value Date    GLUF 87 05/15/2019       Insulin     Thyroid  No results found for: TSH, Y6PODUS, T2AJVUG, THYROIDAB    Hepatic Function Panel  Lab Results   Component Value Date    ALT 36 05/15/2019    AST 18 05/15/2019    ALKPHOS 76 05/15/2019    BILITOT 0 7 07/23/2015       Celiac Disease Antibody Panel  No results found for: ENDOMYSIAL IGA, GLIADIN IGA, GLIADIN IGG, IGA, TISSUE TRANSGLUT AB, TTG IGA   Iron  No results found for: IRON, TIBC, FERRITIN    Vitamins  No results found for: VITAMIN B2   No results found for: NICOTINAMIDE, NICOTINIC ACID   No results found for: VITAMINB6  No results found for: UWVRJWJO18  No results found for: VITB5  No results found for: X1IKOGPS  No results found for: THYROGLB  No results found for: VITAMIN K   No results found for: 25-HYDROXY VIT D   No components found for: VITAMINE     No follow-ups on file      [id number] [address] [place] [address] [address] [id number]

## 2021-07-06 ENCOUNTER — IMMUNIZATIONS (OUTPATIENT)
Dept: FAMILY MEDICINE CLINIC | Facility: HOSPITAL | Age: 26
End: 2021-07-06

## 2021-07-06 DIAGNOSIS — Z23 ENCOUNTER FOR IMMUNIZATION: Primary | ICD-10-CM

## 2021-07-06 PROCEDURE — 0002A SARS-COV-2 / COVID-19 MRNA VACCINE (PFIZER-BIONTECH) 30 MCG: CPT

## 2021-07-06 PROCEDURE — 91300 SARS-COV-2 / COVID-19 MRNA VACCINE (PFIZER-BIONTECH) 30 MCG: CPT

## 2021-07-29 ENCOUNTER — ANNUAL EXAM (OUTPATIENT)
Dept: OBGYN CLINIC | Facility: CLINIC | Age: 26
End: 2021-07-29
Payer: COMMERCIAL

## 2021-07-29 VITALS
WEIGHT: 285 LBS | SYSTOLIC BLOOD PRESSURE: 110 MMHG | DIASTOLIC BLOOD PRESSURE: 72 MMHG | BODY MASS INDEX: 44.73 KG/M2 | HEIGHT: 67 IN

## 2021-07-29 DIAGNOSIS — Z01.419 ENCOUNTER FOR ANNUAL ROUTINE GYNECOLOGICAL EXAMINATION: Primary | ICD-10-CM

## 2021-07-29 DIAGNOSIS — Z30.09 GENERAL COUNSELING AND ADVICE ON FEMALE CONTRACEPTION: ICD-10-CM

## 2021-07-29 PROCEDURE — 99395 PREV VISIT EST AGE 18-39: CPT | Performed by: OBSTETRICS & GYNECOLOGY

## 2021-07-29 NOTE — PROGRESS NOTES
Assessment/Plan:    pap is up to date     Discussed self breast exams      Contraception- she currently does not need contraception but we discussed other options for her  I explained the recommendation to avoid estrogen if she has migraine with aura  We discussed increased risk of stroke noted  We discussed condoms, progesterone only pills, Depo-Provera, Nexplanon, both types of IUD  Depo-Provera may not be a good option for her due to weight gain  She would be interested in progesterone only pills or an IUD  I gave her information to review on the above  She will call us if she wants to start 1 of these  discussed preventive care, regular exercise and a healthy diet      No problem-specific Assessment & Plan notes found for this encounter  Diagnoses and all orders for this visit:    Encounter for annual routine gynecological examination          Subjective:      Patient ID: Kristina Reno is a 23 y o  female  Pt here for yearly  She stopped her generic Ortho Cyclen 2 months ago because she did not have her new pack on time  Also, since last year, she was diagnosed with migraine with aura  She has noticed less headaches since she stopped her pills  She has not been sexually active in about 2 years  Her blood pressure is good  Her menses have been regular for the past 2 months but she notices her acne is worse since stopping the pills  She started meeting with a nutritionist and she is no longer taking the weight loss medication  Normal pap in 2020 , there was a suggestion of BV on her Pap but she has no symptoms  The following portions of the patient's history were reviewed and updated as appropriate: allergies, current medications, past family history, past medical history, past social history, past surgical history and problem list     Review of Systems   Constitutional: Negative  Gastrointestinal: Negative  Genitourinary: Negative            Objective:      /72 (BP Location: Left arm, Patient Position: Sitting, Cuff Size: Large)   Ht 5' 6 5" (1 689 m)   Wt 84 kg (185 lb 3 2 oz)   LMP 06/29/2021   BMI 29 44 kg/m²          Physical Exam  Vitals reviewed  Constitutional:       Appearance: She is well-developed  Neck:      Thyroid: No thyromegaly  Trachea: No tracheal deviation  Cardiovascular:      Rate and Rhythm: Normal rate and regular rhythm  Pulmonary:      Effort: Pulmonary effort is normal       Breath sounds: Normal breath sounds  Chest:      Breasts: Breasts are symmetrical          Right: No inverted nipple, mass, nipple discharge, skin change or tenderness  Left: No inverted nipple, mass, nipple discharge, skin change or tenderness  Abdominal:      General: There is no distension  Palpations: Abdomen is soft  There is no mass  Tenderness: There is no abdominal tenderness  Genitourinary:     Labia:         Right: No rash, tenderness, lesion or injury  Left: No rash, tenderness, lesion or injury  Vagina: Normal       Cervix: No cervical motion tenderness, discharge or friability  Adnexa:         Right: No mass, tenderness or fullness  Left: No mass, tenderness or fullness

## 2021-09-08 ENCOUNTER — LAB (OUTPATIENT)
Dept: LAB | Facility: HOSPITAL | Age: 26
End: 2021-09-08
Attending: INTERNAL MEDICINE
Payer: COMMERCIAL

## 2021-09-08 ENCOUNTER — OFFICE VISIT (OUTPATIENT)
Dept: FAMILY MEDICINE CLINIC | Facility: HOSPITAL | Age: 26
End: 2021-09-08
Payer: COMMERCIAL

## 2021-09-08 VITALS
OXYGEN SATURATION: 97 % | HEART RATE: 85 BPM | WEIGHT: 285.6 LBS | BODY MASS INDEX: 44.83 KG/M2 | DIASTOLIC BLOOD PRESSURE: 68 MMHG | SYSTOLIC BLOOD PRESSURE: 112 MMHG | HEIGHT: 67 IN

## 2021-09-08 DIAGNOSIS — G43.109 MIGRAINE WITH AURA AND WITHOUT STATUS MIGRAINOSUS, NOT INTRACTABLE: ICD-10-CM

## 2021-09-08 DIAGNOSIS — J45.30 MILD PERSISTENT ASTHMA WITHOUT COMPLICATION: ICD-10-CM

## 2021-09-08 DIAGNOSIS — E66.01 CLASS 3 SEVERE OBESITY DUE TO EXCESS CALORIES WITHOUT SERIOUS COMORBIDITY WITH BODY MASS INDEX (BMI) OF 45.0 TO 49.9 IN ADULT (HCC): ICD-10-CM

## 2021-09-08 DIAGNOSIS — J45.20 MILD INTERMITTENT ASTHMA WITHOUT COMPLICATION: ICD-10-CM

## 2021-09-08 DIAGNOSIS — F98.8 ATTENTION DEFICIT DISORDER (ADD) WITHOUT HYPERACTIVITY: Chronic | ICD-10-CM

## 2021-09-08 DIAGNOSIS — F51.01 PRIMARY INSOMNIA: ICD-10-CM

## 2021-09-08 DIAGNOSIS — Z00.00 ANNUAL PHYSICAL EXAM: Primary | ICD-10-CM

## 2021-09-08 LAB
ALBUMIN SERPL BCP-MCNC: 3.7 G/DL (ref 3.5–5)
ALP SERPL-CCNC: 104 U/L (ref 46–116)
ALT SERPL W P-5'-P-CCNC: 24 U/L (ref 12–78)
ANION GAP SERPL CALCULATED.3IONS-SCNC: 8 MMOL/L (ref 4–13)
AST SERPL W P-5'-P-CCNC: 15 U/L (ref 5–45)
BASOPHILS # BLD AUTO: 0.02 THOUSANDS/ΜL (ref 0–0.1)
BASOPHILS NFR BLD AUTO: 0 % (ref 0–1)
BILIRUB SERPL-MCNC: 0.68 MG/DL (ref 0.2–1)
BUN SERPL-MCNC: 13 MG/DL (ref 5–25)
CALCIUM SERPL-MCNC: 9 MG/DL (ref 8.3–10.1)
CHLORIDE SERPL-SCNC: 106 MMOL/L (ref 100–108)
CHOLEST SERPL-MCNC: 158 MG/DL (ref 50–200)
CO2 SERPL-SCNC: 25 MMOL/L (ref 21–32)
CREAT SERPL-MCNC: 0.73 MG/DL (ref 0.6–1.3)
EOSINOPHIL # BLD AUTO: 0.17 THOUSAND/ΜL (ref 0–0.61)
EOSINOPHIL NFR BLD AUTO: 2 % (ref 0–6)
ERYTHROCYTE [DISTWIDTH] IN BLOOD BY AUTOMATED COUNT: 13.4 % (ref 11.6–15.1)
GFR SERPL CREATININE-BSD FRML MDRD: 116 ML/MIN/1.73SQ M
GLUCOSE P FAST SERPL-MCNC: 85 MG/DL (ref 65–99)
HCT VFR BLD AUTO: 42.5 % (ref 34.8–46.1)
HDLC SERPL-MCNC: 58 MG/DL
HGB BLD-MCNC: 13.4 G/DL (ref 11.5–15.4)
IMM GRANULOCYTES # BLD AUTO: 0.03 THOUSAND/UL (ref 0–0.2)
IMM GRANULOCYTES NFR BLD AUTO: 0 % (ref 0–2)
LDLC SERPL CALC-MCNC: 90 MG/DL (ref 0–100)
LYMPHOCYTES # BLD AUTO: 2.33 THOUSANDS/ΜL (ref 0.6–4.47)
LYMPHOCYTES NFR BLD AUTO: 26 % (ref 14–44)
MCH RBC QN AUTO: 28.3 PG (ref 26.8–34.3)
MCHC RBC AUTO-ENTMCNC: 31.5 G/DL (ref 31.4–37.4)
MCV RBC AUTO: 90 FL (ref 82–98)
MONOCYTES # BLD AUTO: 0.93 THOUSAND/ΜL (ref 0.17–1.22)
MONOCYTES NFR BLD AUTO: 10 % (ref 4–12)
NEUTROPHILS # BLD AUTO: 5.45 THOUSANDS/ΜL (ref 1.85–7.62)
NEUTS SEG NFR BLD AUTO: 62 % (ref 43–75)
NRBC BLD AUTO-RTO: 0 /100 WBCS
PLATELET # BLD AUTO: 368 THOUSANDS/UL (ref 149–390)
PMV BLD AUTO: 9.1 FL (ref 8.9–12.7)
POTASSIUM SERPL-SCNC: 4 MMOL/L (ref 3.5–5.3)
PROT SERPL-MCNC: 7.4 G/DL (ref 6.4–8.2)
RBC # BLD AUTO: 4.74 MILLION/UL (ref 3.81–5.12)
SODIUM SERPL-SCNC: 139 MMOL/L (ref 136–145)
TRIGL SERPL-MCNC: 52 MG/DL
TSH SERPL DL<=0.05 MIU/L-ACNC: 1.84 UIU/ML (ref 0.36–3.74)
WBC # BLD AUTO: 8.93 THOUSAND/UL (ref 4.31–10.16)

## 2021-09-08 PROCEDURE — 84443 ASSAY THYROID STIM HORMONE: CPT

## 2021-09-08 PROCEDURE — 80053 COMPREHEN METABOLIC PANEL: CPT

## 2021-09-08 PROCEDURE — 99395 PREV VISIT EST AGE 18-39: CPT | Performed by: INTERNAL MEDICINE

## 2021-09-08 PROCEDURE — 85025 COMPLETE CBC W/AUTO DIFF WBC: CPT

## 2021-09-08 PROCEDURE — 80061 LIPID PANEL: CPT

## 2021-09-08 PROCEDURE — 36415 COLL VENOUS BLD VENIPUNCTURE: CPT

## 2021-09-08 RX ORDER — ZOLPIDEM TARTRATE 5 MG/1
5 TABLET ORAL
Qty: 30 TABLET | Refills: 0 | Status: CANCELLED | OUTPATIENT
Start: 2021-09-08

## 2021-09-08 RX ORDER — LEVALBUTEROL TARTRATE 45 UG/1
2 AEROSOL, METERED ORAL EVERY 6 HOURS PRN
Qty: 15 G | Refills: 4 | Status: SHIPPED | OUTPATIENT
Start: 2021-09-08

## 2021-09-08 RX ORDER — ZOLPIDEM TARTRATE 5 MG/1
5 TABLET ORAL
Qty: 30 TABLET | Refills: 0 | Status: SHIPPED | OUTPATIENT
Start: 2021-09-08 | End: 2022-03-15 | Stop reason: SDUPTHER

## 2021-09-08 NOTE — PROGRESS NOTES
[facility] [address]    NAME: Keri Urbano  AGE: 23 y o  SEX: female  : 1995     DATE: 2021     Assessment and Plan:     Problem List Items Addressed This Visit        Respiratory    Mild persistent asthma without complication     Using brio and xopenex   last attack had empty inhaler- using rescue rarely         Relevant Medications    levalbuterol (Xopenex HFA) 45 mcg/act inhaler       Cardiovascular and Mediastinum    Migraine with aura and without status migrainosus, not intractable     Was seen by gyn- stopped her bcp and went away completely since off those- was getting daily headaches for 2 weeks- now  Gets only occasionally with menses         Relevant Orders    CBC and differential (Completed)    Comprehensive metabolic panel (Completed)       Other    ADD (attention deficit disorder) (Chronic)    Relevant Medications    zolpidem (AMBIEN) 5 mg tablet    Class 3 severe obesity in [facility])     Saw nutritionist   stopped the phentermine         Relevant Orders    CBC and differential (Completed)    Comprehensive metabolic panel (Completed)    Lipid Panel with Direct LDL reflex (Completed)    TSH, 3rd generation with Free T4 reflex (Completed)    Primary insomnia     Uses prn ambien 5 mg about 1-2 x a week         Relevant Medications    zolpidem (AMBIEN) 5 mg tablet      Other Visit Diagnoses     Annual physical exam    -  Primary    Mild intermittent asthma without complication        Relevant Medications    levalbuterol (Xopenex HFA) 45 mcg/act inhaler    Other Relevant Orders    CBC and differential (Completed)          Immunizations and preventive care screenings were discussed with patient today  Appropriate education was printed on patient's after visit summary  Counseling:  · Exercise: the importance of regular exercise/physical activity was discussed   Recommend exercise 3-5 times per week for at least 30 minutes  Return in about 6 months (around 3/8/2022)  Chief Complaint:     Chief Complaint   Patient presents with    Annual Exam      History of Present Illness:     Adult Annual Physical   Patient here for a comprehensive physical exam  The patient reports problems - see porch- asthma   Diet and Physical Activity  · Diet/Nutrition: well balanced diet  · Exercise: walking  Depression Screening  PHQ-9 Depression Screening    PHQ-9:   Frequency of the following problems over the past two weeks:      Little interest or pleasure in doing things: 0 - not at all  Feeling down, depressed, or hopeless: 0 - not at all  PHQ-2 Score: 0       General Health  · Sleep: sleeps poorly  · Hearing: normal - bilateral   · Vision: no vision problems  · Dental: regular dental visits  /GYN Health  · Last menstrual period: regular  · Contraceptive method: none now  · History of STDs?: no      Review of Systems:     Review of Systems   Psychiatric/Behavioral:        Requesting a spychologist   All other systems reviewed and are negative  Past Medical History:     Past Medical History:   Diagnosis Date    Attention and concentration deficit     last assessed: Nov 2, 2016    Status asthmaticus       Past Surgical History:     Past Surgical History:   Procedure Laterality Date    APPENDECTOMY      TONSILLECTOMY      TOOTH EXTRACTION        Social History:     Social History     Socioeconomic History    Marital status: Single     Spouse name: None    Number of children: None    Years of education: None    Highest education level: None   Occupational History    None   Tobacco Use    Smoking status: Never Smoker    Smokeless tobacco: Never Used   Vaping Use    Vaping Use: Never used   Substance and Sexual Activity    Alcohol use:  Yes    Drug use: No    Sexual activity: Not Currently     Partners: Male     Birth control/protection: Condom, OCP   Other Topics Concern    None Social History Narrative    Always uses seat belt     Caffeine use     Regular dental care    Exercise habits    Lives with mom    Feels safe at home    No living will     Social Determinants of Health     Financial Resource Strain:     Difficulty of Paying Living Expenses:    Food Insecurity:     Worried About Running Out of Food in the Last Year:     [address] in the Last Year:    Transportation Needs:     Lack of Transportation (Medical):  Lack of Transportation (Non-Medical):    Physical Activity:     Days of Exercise per Week:     Minutes of Exercise per Session:    Stress:     Feeling of Stress :    Social Connections:     Frequency of Communication with Friends and Family:     Frequency of Social Gatherings with Friends and Family:     Attends Jew Services:     Active Member of Clubs or Organizations:     Attends Club or Organization Meetings:     Marital Status:    Intimate Partner Violence:     Fear of Current or Ex-Partner:     Emotionally Abused:     Physically Abused:     Sexually Abused:       Family History:     Family History   Problem Relation Age of Onset    Anxiety disorder Father     Anxiety disorder Paternal Grandfather     Thyroid disease Paternal Grandfather     Throat cancer Paternal Grandfather     No Known Problems Mother     No Known Problems Brother     Breast cancer Maternal Grandmother     No Known Problems Maternal Grandfather     No Known Problems Paternal Grandmother     No Known Problems Maternal Aunt     No Known Problems Paternal Aunt     Substance Abuse Neg Hx     Mental illness Neg Hx       Current Medications:     Current Outpatient Medications   Medication Sig Dispense Refill    ALPRAZolam (XANAX) 0 25 mg tablet Take 1 tablet (0 25 mg total) by mouth 2 (two) times a day as needed for anxiety (panic attacks) 20 tablet 0    fluticasone-vilanterol (BREO ELLIPTA) 100-25 mcg/inh inhaler Inhale 1 puff daily Rinse mouth after use   3 Inhaler 1    levalbuterol (Xopenex HFA) 45 mcg/act inhaler Inhale 2 puffs every 6 (six) hours as needed for wheezing Intolerant to albuterol 15 g 4    methylphenidate (METADATE CD) 30 MG CR capsule Take 1 capsule (30 mg total) by mouth dailyMax Daily Amount: 30 mg 30 capsule 0    ondansetron (ZOFRAN) 4 mg tablet Take 1 tablet (4 mg total) by mouth every 8 (eight) hours as needed for nausea or vomiting for up to 4 days Send to [address] [place] [address] 12 tablet 0    rizatriptan (MAXALT) 10 MG tablet Take 1 tablet (10 mg total) by mouth once as needed for migraine for up to 9 doses May repeat in 2 hours if needed 9 tablet 0    zolpidem (AMBIEN) 5 mg tablet Take 1 tablet (5 mg total) by mouth daily at bedtime as needed for sleep 30 tablet 0     No current facility-administered medications for this visit  Allergies:     No Known Allergies   Physical Exam:     /68   Pulse 85   Ht 5' 6 5" (1 689 m)   Wt 130 kg (285 lb 9 6 oz)   SpO2 97%   BMI 45 41 kg/m²     Physical Exam  Vitals and nursing note reviewed  Constitutional:       Appearance: She is obese  HENT:      Right Ear: Tympanic membrane normal       Left Ear: Tympanic membrane normal       Mouth/Throat:      Mouth: Mucous membranes are dry  Pharynx: No posterior oropharyngeal erythema  Eyes:      General: No scleral icterus  Right eye: No discharge  Left eye: No discharge  Extraocular Movements: Extraocular movements intact  Pupils: Pupils are equal, round, and reactive to light  Cardiovascular:      Rate and Rhythm: Normal rate and regular rhythm  Heart sounds: No murmur heard  Pulmonary:      Effort: Pulmonary effort is normal       Breath sounds: Normal breath sounds  No stridor  No wheezing  Abdominal:      General: Bowel sounds are normal  There is no distension  Palpations: Abdomen is soft  Musculoskeletal:         General: No swelling or deformity  Cervical back: No rigidity  Skin:     Coloration: Skin is not jaundiced  Findings: No erythema  Neurological:      General: No focal deficit present  Mental Status: She is alert and oriented to person, place, and time  Psychiatric:         Mood and Affect: Mood normal          Thought Content:  Thought content normal          Judgment: Judgment normal           DO MARGARITO [facility] [address]

## 2021-09-08 NOTE — ASSESSMENT & PLAN NOTE
Was seen by gyn- stopped her bcp and went away completely since off those- was getting daily headaches for 2 weeks- now  Gets only occasionally with menses

## 2021-09-08 NOTE — PATIENT INSTRUCTIONS
Give number for our counselor Lee Pierre to schedule- needs to check if she participates with her [employer] insurance   call to schedule breast us- order in [facility]      Wellness Visit for Adults   AMBULATORY CARE:   A wellness visit  is when you see your healthcare provider to get screened for health problems  Your healthcare provider will also give you advice on how to stay healthy  Write down your questions so you remember to ask them  Ask your healthcare provider how often you should have a wellness visit  What happens at a wellness visit:  Your healthcare provider will ask about your health, and your family history of health problems  This includes high blood pressure, heart disease, and cancer  He or she will ask if you have symptoms that concern you, if you smoke, and about your mood  You may also be asked about your intake of medicines, supplements, food, and alcohol  Any of the following may be done:  · Your weight  will be checked  Your height may also be checked so your body mass index (BMI) can be calculated  Your BMI shows if you are at a healthy weight  · Your blood pressure  and heart rate will be checked  Your temperature may also be checked  · Blood and urine tests  may be done  Blood tests may be done to check your cholesterol levels  Abnormal cholesterol levels increase your risk for heart disease and stroke  You may also need a blood or urine test to check for diabetes if you are at increased risk  Urine tests may be done to look for signs of an infection or kidney disease  · A physical exam  includes checking your heartbeat and lungs with a stethoscope  Your healthcare provider may also check your skin to look for sun damage  · Screening tests  may be recommended  A screening test is done to check for diseases that may not cause symptoms  The screening tests you may need depend on your age, gender, family history, and lifestyle habits   For example, colorectal screening may be recommended if you are 48years old or older  Screening tests you need if you are a woman:   · A Pap smear  is used to screen for cervical cancer  Pap smears are usually done every 3 to 5 years depending on your age  You may need them more often if you have had abnormal Pap smear test results in the past  Ask your healthcare provider how often you should have a Pap smear  · A mammogram  is an x-ray of your breasts to screen for breast cancer  Experts recommend mammograms every 2 years starting at age 48 years  You may need a mammogram at age 52 years or younger if you have an increased risk for breast cancer  Talk to your healthcare provider about when you should start having mammograms and how often you need them  Vaccines you may need:   · Get an influenza vaccine  every year  The influenza vaccine protects you from the flu  Several types of viruses cause the flu  The viruses change over time, so new vaccines are made each year  · Get a tetanus-diphtheria (Td) booster vaccine  every 10 years  This vaccine protects you against tetanus and diphtheria  Tetanus is a severe infection that may cause painful muscle spasms and lockjaw  Diphtheria is a severe bacterial infection that causes a thick covering in the back of your mouth and throat  · Get a human papillomavirus (HPV) vaccine  if you are female and aged 23 to 32 or male 23 to 24 and never received it  This vaccine protects you from HPV infection  HPV is the most common infection spread by sexual contact  HPV may also cause vaginal, penile, and anal cancers  · Get a pneumococcal vaccine  if you are aged 72 years or older  The pneumococcal vaccine is an injection given to protect you from pneumococcal disease  Pneumococcal disease is an infection caused by pneumococcal bacteria  The infection may cause pneumonia, meningitis, or an ear infection  · Get a shingles vaccine  if you are 60 or older, even if you have had shingles before   The shingles vaccine is an injection to protect you from the varicella-zoster virus  This is the same virus that causes chickenpox  Shingles is a painful rash that develops in people who had chickenpox or have been exposed to the virus  How to eat healthy:  My Plate is a model for planning healthy meals  It shows the types and amounts of foods that should go on your plate  Fruits and vegetables make up about half of your plate, and grains and protein make up the other half  A serving of dairy is included on the side of your plate  The amount of calories and serving sizes you need depends on your age, gender, weight, and height  Examples of healthy foods are listed below:  · Eat a variety of vegetables  such as dark green, red, and orange vegetables  You can also include canned vegetables low in sodium (salt) and frozen vegetables without added butter or sauces  · Eat a variety of fresh fruits , canned fruit in 100% juice, frozen fruit, and dried fruit  · Include whole grains  At least half of the grains you eat should be whole grains  Examples include whole-wheat bread, wheat pasta, brown rice, and whole-grain cereals such as oatmeal     · Eat a variety of protein foods such as seafood (fish and shellfish), lean meat, and poultry without skin (turkey and chicken)  Examples of lean meats include pork leg, shoulder, or tenderloin, and beef round, sirloin, tenderloin, and extra lean ground beef  Other protein foods include eggs and egg substitutes, beans, peas, soy products, nuts, and seeds  · Choose low-fat dairy products such as skim or 1% milk or low-fat yogurt, cheese, and cottage cheese  · Limit unhealthy fats  such as butter, hard margarine, and shortening  Exercise:  Exercise at least 30 minutes per day on most days of the week  Some examples of exercise include walking, biking, dancing, and swimming   You can also fit in more physical activity by taking the stairs instead of the elevator or parking farther away from stores  Include muscle strengthening activities 2 days each week  Regular exercise provides many health benefits  It helps you manage your weight, and decreases your risk for type 2 diabetes, heart disease, stroke, and high blood pressure  Exercise can also help improve your mood  Ask your healthcare provider about the best exercise plan for you  General health and safety guidelines:   · Do not smoke  Nicotine and other chemicals in cigarettes and cigars can cause lung damage  Ask your healthcare provider for information if you currently smoke and need help to quit  E-cigarettes or smokeless tobacco still contain nicotine  Talk to your healthcare provider before you use these products  · Limit alcohol  A drink of alcohol is 12 ounces of beer, 5 ounces of wine, or 1½ ounces of liquor  · Lose weight, if needed  Being overweight increases your risk of certain health conditions  These include heart disease, high blood pressure, type 2 diabetes, and certain types of cancer  · Protect your skin  Do not sunbathe or use tanning beds  Use sunscreen with a SPF 15 or higher  Apply sunscreen at least 15 minutes before you go outside  Reapply sunscreen every 2 hours  Wear protective clothing, hats, and sunglasses when you are outside  · Drive safely  Always wear your seatbelt  Make sure everyone in your car wears a seatbelt  A seatbelt can save your life if you are in an accident  Do not use your cell phone when you are driving  This could distract you and cause an accident  Pull over if you need to make a call or send a text message  · Practice safe sex  Use latex condoms if are sexually active and have more than one partner  Your healthcare provider may recommend screening tests for sexually transmitted infections (STIs)  · Wear helmets, lifejackets, and protective gear    Always wear a helmet when you ride a bike or motorcycle, go skiing, or play sports that could cause a head injury  Wear protective equipment when you play sports  Wear a lifejacket when you are on a boat or doing water sports  © Copyright [employer] 2021 Information is for End User's use only and may not be sold, redistributed or otherwise used for commercial purposes  All illustrations and images included in CareNotes® are the copyrighted property of [employer]  or [facility] [place]   The above information is an  only  It is not intended as medical advice for individual conditions or treatments  Talk to your doctor, nurse or pharmacist before following any medical regimen to see if it is safe and effective for you

## 2021-09-11 PROBLEM — F41.9 ANXIETY: Status: ACTIVE | Noted: 2021-09-11

## 2021-09-17 ENCOUNTER — APPOINTMENT (OUTPATIENT)
Dept: RADIOLOGY | Facility: CLINIC | Age: 26
End: 2021-09-17
Payer: COMMERCIAL

## 2021-09-17 ENCOUNTER — OFFICE VISIT (OUTPATIENT)
Dept: URGENT CARE | Facility: CLINIC | Age: 26
End: 2021-09-17
Payer: COMMERCIAL

## 2021-09-17 VITALS
OXYGEN SATURATION: 97 % | WEIGHT: 287.2 LBS | SYSTOLIC BLOOD PRESSURE: 120 MMHG | HEIGHT: 67 IN | HEART RATE: 73 BPM | DIASTOLIC BLOOD PRESSURE: 69 MMHG | BODY MASS INDEX: 45.08 KG/M2 | TEMPERATURE: 98.4 F | RESPIRATION RATE: 16 BRPM

## 2021-09-17 DIAGNOSIS — S99.911A RIGHT ANKLE INJURY, INITIAL ENCOUNTER: ICD-10-CM

## 2021-09-17 DIAGNOSIS — IMO0001 FIRST DEGREE ANKLE SPRAIN, UNSPECIFIED LATERALITY, INITIAL ENCOUNTER: ICD-10-CM

## 2021-09-17 DIAGNOSIS — S99.911A RIGHT ANKLE INJURY, INITIAL ENCOUNTER: Primary | ICD-10-CM

## 2021-09-17 PROCEDURE — 73610 X-RAY EXAM OF ANKLE: CPT

## 2021-09-17 PROCEDURE — G0382 LEV 3 HOSP TYPE B ED VISIT: HCPCS | Performed by: FAMILY MEDICINE

## 2021-09-17 NOTE — PROGRESS NOTES
[facility] [facility]        NAME: Kiki Valencia is a 23 y o  female  : 1995    MRN: [id number]  DATE: 2021  TIME: 11:23 AM    Assessment and Plan   Right ankle injury, initial encounter [[id number] [id number]]  1  Right ankle injury, initial encounter  XR ankle 3+ vw right   2  First degree ankle sprain, unspecified laterality, initial encounter           Patient Instructions       Follow up with PCP in 3-5 days  Proceed to  ER if symptoms worsen  Chief Complaint     Chief Complaint   Patient presents with    Ankle Injury     Pt stepped into a whole in the ground, heard her R ankle pop  Lateral malleolous swelling, pain, 4/10 @ rest, 10/10 with palpation  Pain worsens with any ROM, weight bearing, walking  Injury occured last night         History of Present Illness       61-year-old female presenting with right ankle injury  She reports willing her ankle last evening after stepping in a hole  Review of Systems   Review of Systems   Constitutional: Negative  HENT: Negative  Eyes: Negative  Respiratory: Negative  Cardiovascular: Negative  Gastrointestinal: Negative  Endocrine: Negative  Genitourinary: Negative  Musculoskeletal: Positive for arthralgias and myalgias  Skin: Negative  Allergic/Immunologic: Negative  Neurological: Negative  Hematological: Negative  Psychiatric/Behavioral: Negative            Current Medications       Current Outpatient Medications:     ALPRAZolam (XANAX) 0 25 mg tablet, Take 1 tablet (0 25 mg total) by mouth 2 (two) times a day as needed for anxiety (panic attacks) (Patient not taking: Reported on 2021), Disp: 20 tablet, Rfl: 0    fluticasone-vilanterol (BREO ELLIPTA) 100-25 mcg/inh inhaler, Inhale 1 puff daily Rinse mouth after use , Disp: 3 Inhaler, Rfl: 1    levalbuterol (Xopenex HFA) 45 mcg/act inhaler, Inhale 2 puffs every 6 (six) hours as needed for wheezing Intolerant to albuterol, Disp: 15 g, Rfl: 4   methylphenidate (METADATE CD) 30 MG CR capsule, Take 1 capsule (30 mg total) by mouth dailyMax Daily Amount: 30 mg, Disp: 30 capsule, Rfl: 0    ondansetron (ZOFRAN) 4 mg tablet, Take 1 tablet (4 mg total) by mouth every 8 (eight) hours as needed for nausea or vomiting for up to 4 days Send to [address] [place] [address], Disp: 12 tablet, Rfl: 0    rizatriptan (MAXALT) 10 MG tablet, Take 1 tablet (10 mg total) by mouth once as needed for migraine for up to 9 doses May repeat in 2 hours if needed, Disp: 9 tablet, Rfl: 0    zolpidem (AMBIEN) 5 mg tablet, Take 1 tablet (5 mg total) by mouth daily at bedtime as needed for sleep, Disp: 30 tablet, Rfl: 0    Current Allergies     Allergies as of 09/17/2021    (No Known Allergies)            The following portions of the patient's history were reviewed and updated as appropriate: allergies, current medications, past family history, past medical history, past social history, past surgical history and problem list      Past Medical History:   Diagnosis Date    ADHD (attention deficit hyperactivity disorder)     Asthma     Attention and concentration deficit     last assessed: Nov 2, 2016    Status asthmaticus        Past Surgical History:   Procedure Laterality Date    TONSILLECTOMY      TOOTH EXTRACTION         Family History   Problem Relation Age of Onset    Anxiety disorder Father     Anxiety disorder Paternal Grandfather     Thyroid disease Paternal Grandfather     Throat cancer Paternal Grandfather     No Known Problems Mother     No Known Problems Brother     Breast cancer Maternal Grandmother     No Known Problems Maternal Grandfather     No Known Problems Paternal Grandmother     No Known Problems Maternal Aunt     No Known Problems Paternal Aunt     Substance Abuse Neg Hx     Mental illness Neg Hx          Medications have been verified          Objective   /69   Pulse 73   Temp 98 4 °F (36 9 °C)   Resp 16   Ht 5' 6 5" (1 689 m)   [employer] 130 kg (287 lb 3 2 oz)   SpO2 97%   BMI 45 66 kg/m²   No LMP recorded  Physical Exam     Physical Exam  Vitals and nursing note reviewed  Constitutional:       Appearance: She is well-developed  HENT:      Head: Normocephalic  Nose: Nose normal  No congestion  Eyes:      Pupils: Pupils are equal, round, and reactive to light  Cardiovascular:      Rate and Rhythm: Normal rate and regular rhythm  Pulmonary:      Effort: Pulmonary effort is normal    Musculoskeletal:      Right ankle: Swelling present  Tenderness present over the lateral malleolus and ATF ligament  Decreased range of motion  Skin:     General: Skin is warm and dry  Neurological:      Mental Status: She is alert and oriented to person, place, and time

## 2021-11-16 ENCOUNTER — TELEMEDICINE (OUTPATIENT)
Dept: FAMILY MEDICINE CLINIC | Facility: HOSPITAL | Age: 26
End: 2021-11-16
Payer: COMMERCIAL

## 2021-11-16 VITALS — HEIGHT: 67 IN | BODY MASS INDEX: 43.95 KG/M2 | WEIGHT: 280 LBS

## 2021-11-16 DIAGNOSIS — B34.9 VIRAL INFECTION, UNSPECIFIED: Primary | ICD-10-CM

## 2021-11-16 PROCEDURE — U0005 INFEC AGEN DETEC AMPLI PROBE: HCPCS | Performed by: NURSE PRACTITIONER

## 2021-11-16 PROCEDURE — 99213 OFFICE O/P EST LOW 20 MIN: CPT | Performed by: NURSE PRACTITIONER

## 2021-11-16 PROCEDURE — U0003 INFECTIOUS AGENT DETECTION BY NUCLEIC ACID (DNA OR RNA); SEVERE ACUTE RESPIRATORY SYNDROME CORONAVIRUS 2 (SARS-COV-2) (CORONAVIRUS DISEASE [COVID-19]), AMPLIFIED PROBE TECHNIQUE, MAKING USE OF HIGH THROUGHPUT TECHNOLOGIES AS DESCRIBED BY CMS-2020-01-R: HCPCS | Performed by: NURSE PRACTITIONER

## 2022-01-03 ENCOUNTER — CLINICAL SUPPORT (OUTPATIENT)
Dept: FAMILY MEDICINE CLINIC | Facility: HOSPITAL | Age: 27
End: 2022-01-03

## 2022-01-03 ENCOUNTER — TELEPHONE (OUTPATIENT)
Dept: FAMILY MEDICINE CLINIC | Facility: HOSPITAL | Age: 27
End: 2022-01-03

## 2022-01-03 DIAGNOSIS — U07.1 COVID: Primary | ICD-10-CM

## 2022-01-03 PROCEDURE — U0005 INFEC AGEN DETEC AMPLI PROBE: HCPCS | Performed by: INTERNAL MEDICINE

## 2022-01-03 PROCEDURE — U0003 INFECTIOUS AGENT DETECTION BY NUCLEIC ACID (DNA OR RNA); SEVERE ACUTE RESPIRATORY SYNDROME CORONAVIRUS 2 (SARS-COV-2) (CORONAVIRUS DISEASE [COVID-19]), AMPLIFIED PROBE TECHNIQUE, MAKING USE OF HIGH THROUGHPUT TECHNOLOGIES AS DESCRIBED BY CMS-2020-01-R: HCPCS | Performed by: INTERNAL MEDICINE

## 2022-01-03 NOTE — TELEPHONE ENCOUNTER
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

## 2022-01-05 LAB — SARS-COV-2 RNA RESP QL NAA+PROBE: POSITIVE

## 2022-03-15 ENCOUNTER — OFFICE VISIT (OUTPATIENT)
Dept: FAMILY MEDICINE CLINIC | Facility: HOSPITAL | Age: 27
End: 2022-03-15
Payer: COMMERCIAL

## 2022-03-15 VITALS
BODY MASS INDEX: 45.99 KG/M2 | HEIGHT: 67 IN | DIASTOLIC BLOOD PRESSURE: 70 MMHG | HEART RATE: 89 BPM | SYSTOLIC BLOOD PRESSURE: 110 MMHG | WEIGHT: 293 LBS | OXYGEN SATURATION: 98 %

## 2022-03-15 DIAGNOSIS — K64.9 BLEEDING HEMORRHOIDS: ICD-10-CM

## 2022-03-15 DIAGNOSIS — N76.1 SUBACUTE VAGINITIS: ICD-10-CM

## 2022-03-15 DIAGNOSIS — F51.01 PRIMARY INSOMNIA: ICD-10-CM

## 2022-03-15 DIAGNOSIS — U07.1 COVID-19: ICD-10-CM

## 2022-03-15 DIAGNOSIS — J45.30 MILD PERSISTENT ASTHMA WITHOUT COMPLICATION: Primary | ICD-10-CM

## 2022-03-15 DIAGNOSIS — F98.8 ATTENTION DEFICIT DISORDER (ADD) WITHOUT HYPERACTIVITY: Chronic | ICD-10-CM

## 2022-03-15 DIAGNOSIS — E66.01 CLASS 3 SEVERE OBESITY DUE TO EXCESS CALORIES WITHOUT SERIOUS COMORBIDITY WITH BODY MASS INDEX (BMI) OF 45.0 TO 49.9 IN ADULT (HCC): ICD-10-CM

## 2022-03-15 PROCEDURE — 99214 OFFICE O/P EST MOD 30 MIN: CPT | Performed by: INTERNAL MEDICINE

## 2022-03-15 RX ORDER — ZOLPIDEM TARTRATE 5 MG/1
5 TABLET ORAL
Qty: 90 TABLET | Refills: 0 | Status: SHIPPED | OUTPATIENT
Start: 2022-03-15

## 2022-03-15 RX ORDER — HYDROCORTISONE ACETATE 25 MG/1
25 SUPPOSITORY RECTAL 2 TIMES DAILY
Qty: 12 SUPPOSITORY | Refills: 3 | Status: SHIPPED | OUTPATIENT
Start: 2022-03-15

## 2022-03-15 NOTE — ASSESSMENT & PLAN NOTE
Using inhaler before exercise - no recent symptoms   in past uses brio after covid    will get 2nd shot after April 17- tested positive in January 17

## 2022-03-15 NOTE — PROGRESS NOTES
Assessment/Plan:             Problem List Items Addressed This Visit        Respiratory    Mild persistent asthma without complication - Primary     Using inhaler before exercise - no recent symptoms   in past uses brio after covid    will get 2nd shot after April 17- tested positive in January 17            Other    ADD (attention deficit disorder) (Chronic)     Needed metadate during college - not taking currenlty but is applying for teaching jobs         Class 3 severe obesity in [facility])    COVID-19     Had in mid January- needed [facility] for about a month then had gi bug about  2 weeks later                 Subjective:      Patient ID: Blanca Jovel is a 27 y o  female    See porch   also has vaginal itching - tried otc products- will refer to dr Chikis Acosta her gyn for cx-   Also has hemorrhoids- recent flare with some bleeding      The following portions of the patient's history were reviewed and updated as appropriate: allergies, current medications and problem list      Review of Systems   Constitutional: Negative for fatigue and fever  Respiratory: Negative for cough  Cardiovascular: Negative for chest pain and palpitations  Genitourinary: Negative for vaginal discharge  Puritis   All other systems reviewed and are negative          Objective:      Current Outpatient Medications:     levalbuterol (Xopenex HFA) 45 mcg/act inhaler, Inhale 2 puffs every 6 (six) hours as needed for wheezing Intolerant to albuterol, Disp: 15 g, Rfl: 4    ondansetron (ZOFRAN) 4 mg tablet, Take 1 tablet (4 mg total) by mouth every 8 (eight) hours as needed for nausea or vomiting for up to 4 days Send to [address] [place] [address], Disp: 12 tablet, Rfl: 0    rizatriptan (MAXALT) 10 MG tablet, Take 1 tablet (10 mg total) by mouth once as needed for migraine for up to 9 doses May repeat in 2 hours if needed, Disp: 9 tablet, Rfl: 0    zolpidem (AMBIEN) 5 mg tablet, Take 1 tablet (5 mg total) by mouth daily at bedtime as needed for sleep, Disp: 30 tablet, Rfl: 0    ALPRAZolam (XANAX) 0 25 mg tablet, Take 1 tablet (0 25 mg total) by mouth 2 (two) times a day as needed for anxiety (panic attacks) (Patient not taking: Reported on 9/17/2021), Disp: 20 tablet, Rfl: 0    Blood pressure 110/70, pulse 89, height 5' 6 5" (1 689 m), weight 135 kg (297 lb), SpO2 98 %  Physical Exam  Vitals and nursing note reviewed  Constitutional:       General: She is not in acute distress  HENT:      Right Ear: There is no impacted cerumen  Left Ear: There is no impacted cerumen  Mouth/Throat:      Pharynx: No oropharyngeal exudate or posterior oropharyngeal erythema  Eyes:      General: No scleral icterus  Right eye: No discharge  Left eye: No discharge  Cardiovascular:      Rate and Rhythm: Normal rate and regular rhythm  Heart sounds: No murmur heard  Pulmonary:      Breath sounds: No wheezing or rhonchi  Abdominal:      General: Abdomen is flat  Palpations: Abdomen is soft  Tenderness: There is no abdominal tenderness  Hernia: No hernia is present  Musculoskeletal:         General: No swelling or tenderness  Cervical back: No rigidity or tenderness  Skin:     Findings: No rash  Neurological:      Mental Status: She is oriented to person, place, and time  Motor: No weakness  Psychiatric:         Mood and Affect: Mood normal          Thought Content:  Thought content normal          Judgment: Judgment normal

## 2022-08-09 ENCOUNTER — OFFICE VISIT (OUTPATIENT)
Dept: OBGYN CLINIC | Facility: CLINIC | Age: 27
End: 2022-08-09
Payer: COMMERCIAL

## 2022-08-09 VITALS
HEIGHT: 67 IN | DIASTOLIC BLOOD PRESSURE: 76 MMHG | WEIGHT: 293 LBS | BODY MASS INDEX: 45.99 KG/M2 | SYSTOLIC BLOOD PRESSURE: 116 MMHG

## 2022-08-09 DIAGNOSIS — Z01.419 ENCOUNTER FOR ANNUAL ROUTINE GYNECOLOGICAL EXAMINATION: Primary | ICD-10-CM

## 2022-08-09 DIAGNOSIS — N76.3 SUBACUTE VULVITIS: ICD-10-CM

## 2022-08-09 DIAGNOSIS — N91.4 SECONDARY OLIGOMENORRHEA: ICD-10-CM

## 2022-08-09 PROCEDURE — S0610 ANNUAL GYNECOLOGICAL EXAMINA: HCPCS | Performed by: NURSE PRACTITIONER

## 2022-08-09 RX ORDER — CLOTRIMAZOLE AND BETAMETHASONE DIPROPIONATE 10; .64 MG/G; MG/G
CREAM TOPICAL 2 TIMES DAILY
Qty: 45 G | Refills: 1 | Status: SHIPPED | OUTPATIENT
Start: 2022-08-09 | End: 2022-09-29 | Stop reason: ALTCHOICE

## 2022-08-09 NOTE — PROGRESS NOTES
Assessment/Plan:  Pap every 3 years if normal, STI testing as indicated, exercise most days of week, obtain appropriate diet and hydration, Calcium 1000mg + 600 vit D daily,  Annual mammogram starting at age 36, monthly breast self exam       Discussed secondary amenorrhea (3 full months w/o period) vs oligomenorrhea ( menstrual periods > 35 days, with 4-9 periods per year) Reviewed possible causes including pregnancy, PCOS, thyroid, pituitary disorders, functional hypothylamic amenorrhea, Will check labs to dx cause  LMP today to call if longer than 3 months without a period Currently NSA so does not need BC Options Provera x 1 week vs Progesterone IUD/mini pill    Vulvitis Lotrisone twice a day for 2 weeks call if no improvement        Diagnoses and all orders for this visit:    Encounter for annual routine gynecological examination    Secondary oligomenorrhea  -     Testosterone, free, total; Future  -     TSH, 3rd generation with Free T4 reflex; Future  -     Prolactin; Future  -     US pelvis complete w transvaginal; Future  -     Estradiol; Future  -     Progesterone; Future  -     Follicle stimulating hormone; Future  -     Luteinizing hormone; Future    Subacute vulvitis  -     clotrimazole-betamethasone (LOTRISONE) 1-0 05 % cream; Apply topically 2 (two) times a day for 14 days          Subjective:      Patient ID: Krishna Cuellar is a 27 y o  female  Here for annual gyn Pev on OCP Stopped 2 years ago as not SA and dx Migraines with [facility]  The past year periods have spaced out to every 3 months Prior to this and even prior to [facility] which she was on since age 14 periods monthly She did actually start with period this AM prior period was 1 month ago 7/10/2022 She has been under stress just moved to [address] [place] [address]  with her mom Her dad and brother are down here  She graduated from [employer] and is looking for [employer] job  She has had no recent change in her weight Thyroid last checked 1 year ago  Denies abdominal or pelvic pain  Bowel and bladder are normal No unusual discharge or vaginal odor Is c/o itching and irritation perineum between vagina and anus  Did use monistat without relief  PAP 2020 neg Has not been SA this year  The following portions of the patient's history were reviewed and updated as appropriate:     Review of Systems   Constitutional: Negative for fatigue and unexpected weight change  Gastrointestinal: Negative for abdominal distention, abdominal pain, constipation and diarrhea  Genitourinary: Positive for menstrual problem  Negative for difficulty urinating, dyspareunia, dysuria, frequency, genital sores, pelvic pain, urgency, vaginal bleeding, vaginal discharge and vaginal pain  Vulvar itching   Neurological: Negative for headaches  Hematological: Negative for adenopathy  Psychiatric/Behavioral: Negative  Negative for dysphoric mood  The patient is not nervous/anxious  Objective:      /76 (BP Location: Left arm, Patient Position: Sitting, Cuff Size: Standard)   Ht 5' 6 5" (1 689 m)   Wt (!) 137 kg (302 lb)   LMP 07/10/2022   BMI 48 01 kg/m²          Physical Exam  Vitals and nursing note reviewed  Constitutional:       General: She is not in acute distress  Appearance: Normal appearance  HENT:      Head: Normocephalic and atraumatic  Chest:   Breasts: Breasts are symmetrical       Right: Normal  No mass, nipple discharge, skin change, tenderness or axillary adenopathy  Left: Normal  No mass, nipple discharge, skin change, tenderness or axillary adenopathy  Abdominal:      General: There is no distension  Palpations: Abdomen is soft  Tenderness: There is no abdominal tenderness  There is no guarding or rebound  Genitourinary:     General: Normal vulva  Exam position: Lithotomy position  Labia:         Right: No rash, tenderness, lesion or injury  Left: No rash, tenderness, lesion or injury  Urethra: No prolapse, urethral pain, urethral swelling or urethral lesion  Vagina: Normal  No erythema or lesions  Cervix: No cervical motion tenderness, discharge, lesion or cervical bleeding  Uterus: Normal        Adnexa: Right adnexa normal and left adnexa normal         Right: No mass or tenderness  Left: No mass or tenderness  Rectum: No mass or external hemorrhoid  Comments: + menses No vulvar rashes   Musculoskeletal:         General: Normal range of motion  Lymphadenopathy:      Upper Body:      Right upper body: No axillary adenopathy  Left upper body: No axillary adenopathy  Lower Body: No right inguinal adenopathy  No left inguinal adenopathy  Skin:     General: Skin is warm and dry  Neurological:      Mental Status: She is alert and oriented to person, place, and time  Psychiatric:         Mood and Affect: Mood normal          Behavior: Behavior normal          Thought Content:  Thought content normal          Judgment: Judgment normal

## 2022-08-09 NOTE — PATIENT INSTRUCTIONS
Pap every 3 years if normal, STI testing as indicated, exercise most days of week, obtain appropriate diet and hydration, Calcium 1000mg + 600 vit D daily,  Annual mammogram starting at age 36, monthly breast self exam       Discussed secondary amenorrhea (3 full months w/o period) vs oligomenorrhea ( menstrual periods > 35 days, with 4-9 periods per year) Reviewed possible causes including pregnancy, PCOS, thyroid, pituitary disorders, functional hypothylamic amenorrhea  Will check labs to dx cause     LMP today to call if longer than 3 months without a period Currently NSA so does not need BC Options Provera x 1 week vs Progesterone IUD/mini pill    Vulvitis Lotrisone twice a day for 2 weeks call if no improvement

## 2022-08-10 ENCOUNTER — APPOINTMENT (OUTPATIENT)
Dept: LAB | Facility: HOSPITAL | Age: 27
End: 2022-08-10
Payer: COMMERCIAL

## 2022-08-10 DIAGNOSIS — N91.4 SECONDARY OLIGOMENORRHEA: ICD-10-CM

## 2022-08-10 LAB
ESTRADIOL SERPL-MCNC: 45 PG/ML
FSH SERPL-ACNC: 6.2 MIU/ML
LH SERPL-ACNC: 4.2 MIU/ML
PROGEST SERPL-MCNC: 1.3 NG/ML
PROLACTIN SERPL-MCNC: 11.6 NG/ML
TSH SERPL DL<=0.05 MIU/L-ACNC: 1.67 UIU/ML (ref 0.45–4.5)

## 2022-08-10 PROCEDURE — 84403 ASSAY OF TOTAL TESTOSTERONE: CPT

## 2022-08-10 PROCEDURE — 82670 ASSAY OF TOTAL ESTRADIOL: CPT

## 2022-08-10 PROCEDURE — 83002 ASSAY OF GONADOTROPIN (LH): CPT

## 2022-08-10 PROCEDURE — 84144 ASSAY OF PROGESTERONE: CPT

## 2022-08-10 PROCEDURE — 83001 ASSAY OF GONADOTROPIN (FSH): CPT

## 2022-08-10 PROCEDURE — 36415 COLL VENOUS BLD VENIPUNCTURE: CPT

## 2022-08-10 PROCEDURE — 84443 ASSAY THYROID STIM HORMONE: CPT

## 2022-08-10 PROCEDURE — 84146 ASSAY OF PROLACTIN: CPT

## 2022-08-10 PROCEDURE — 84402 ASSAY OF FREE TESTOSTERONE: CPT

## 2022-08-11 LAB
TESTOST FREE SERPL-MCNC: 6.1 PG/ML (ref 0–4.2)
TESTOST SERPL-MCNC: 37 NG/DL (ref 13–71)

## 2022-09-29 ENCOUNTER — OFFICE VISIT (OUTPATIENT)
Dept: FAMILY MEDICINE CLINIC | Facility: HOSPITAL | Age: 27
End: 2022-09-29
Payer: COMMERCIAL

## 2022-09-29 VITALS
HEIGHT: 67 IN | SYSTOLIC BLOOD PRESSURE: 120 MMHG | OXYGEN SATURATION: 97 % | WEIGHT: 293 LBS | DIASTOLIC BLOOD PRESSURE: 70 MMHG | BODY MASS INDEX: 45.99 KG/M2 | HEART RATE: 86 BPM

## 2022-09-29 DIAGNOSIS — F98.8 ATTENTION DEFICIT DISORDER (ADD) WITHOUT HYPERACTIVITY: Chronic | ICD-10-CM

## 2022-09-29 DIAGNOSIS — J45.30 MILD PERSISTENT ASTHMA WITHOUT COMPLICATION: ICD-10-CM

## 2022-09-29 DIAGNOSIS — Z23 NEED FOR IMMUNIZATION AGAINST INFLUENZA: Primary | ICD-10-CM

## 2022-09-29 DIAGNOSIS — G43.109 MIGRAINE WITH AURA AND WITHOUT STATUS MIGRAINOSUS, NOT INTRACTABLE: ICD-10-CM

## 2022-09-29 DIAGNOSIS — F41.0 PANIC DISORDER: ICD-10-CM

## 2022-09-29 PROBLEM — U07.1 COVID-19: Status: RESOLVED | Noted: 2022-03-15 | Resolved: 2022-09-29

## 2022-09-29 PROBLEM — IMO0001: Status: RESOLVED | Noted: 2021-09-17 | Resolved: 2022-09-29

## 2022-09-29 PROCEDURE — 99214 OFFICE O/P EST MOD 30 MIN: CPT | Performed by: INTERNAL MEDICINE

## 2022-09-29 PROCEDURE — 90471 IMMUNIZATION ADMIN: CPT

## 2022-09-29 PROCEDURE — 90686 IIV4 VACC NO PRSV 0.5 ML IM: CPT

## 2022-09-29 NOTE — PROGRESS NOTES
Assessment/Plan:     Diagnosis ICD-10-CM Associated Orders   1  Need for immunization against influenza  Z23 influenza vaccine, quadrivalent, 0 5 mL, preservative-free, for adult and pediatric patients 6 mos+ (AFLURIA, FLUARIX, FLULAVAL, FLUZONE)   2  Mild persistent asthma without complication  Z02 88    3  Migraine with aura and without status migrainosus, not intractable  G43 109    4  Attention deficit disorder (ADD) without hyperactivity  F98 8    5  Panic disorder  F41 0        Problem List Items Addressed This Visit        Respiratory    Mild persistent asthma without complication     Encouraged to exercise            Cardiovascular and Mediastinum    Migraine with aura and without status migrainosus, not intractable     Now off bcp- had improved once off the progesterone bcp            Other    ADD (attention deficit disorder) (Chronic)     Used meds in school- not needing any now         Panic disorder     Uses meds before the dentist           Other Visit Diagnoses     Need for immunization against influenza    -  Primary    Relevant Orders    influenza vaccine, quadrivalent, 0 5 mL, preservative-free, for adult and pediatric patients 6 mos+ (AFLURIA, FLUARIX, FLULAVAL, FLUZONE) (Completed)            No follow-ups on file  Subjective:    Patient ID: Jayna Sanders is a 27 y o  female    1  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  2  Depression- some adhd isues with trouble concentrating- 2 years since graduation- needs to get certification- has to repply- [facility]- will refer to psychology  3  Insomnia- not tired until 3 am and then other times sleeps for hours   encouraged to restart exercise- discussed  Using [employer] again to reset sleep cycles      The following portions of the patient's history were reviewed and updated as appropriate: allergies, current medications and problem list      Review of Systems   HENT: Negative for congestion      Respiratory: Negative for shortness of breath and wheezing  Cardiovascular: Negative for chest pain and palpitations  Psychiatric/Behavioral: Positive for decreased concentration and dysphoric mood  All other systems reviewed and are negative  Objective:      Current Outpatient Medications:     zolpidem (AMBIEN) 5 mg tablet, Take 1 tablet (5 mg total) by mouth daily at bedtime as needed for sleep, Disp: 90 tablet, Rfl: 0    levalbuterol (Xopenex HFA) 45 mcg/act inhaler, Inhale 2 puffs every 6 (six) hours as needed for wheezing Intolerant to albuterol, Disp: 15 g, Rfl: 4    Blood pressure 120/70, pulse 86, height 5' 6 5" (1 689 m), weight (!) 136 kg (300 lb 12 8 oz), SpO2 97 %  Physical Exam  Vitals and nursing note reviewed  Constitutional:       General: She is not in acute distress  Appearance: She is obese  HENT:      Right Ear: There is no impacted cerumen  Left Ear: There is no impacted cerumen  Mouth/Throat:      Pharynx: No oropharyngeal exudate or posterior oropharyngeal erythema  Eyes:      General: No scleral icterus  Right eye: No discharge  Left eye: No discharge  Cardiovascular:      Rate and Rhythm: Normal rate and regular rhythm  Heart sounds: No murmur heard  Pulmonary:      Breath sounds: No wheezing or rhonchi  Abdominal:      General: Abdomen is flat  Palpations: Abdomen is soft  Tenderness: There is no abdominal tenderness  Hernia: No hernia is present  Musculoskeletal:         General: No swelling or tenderness  Cervical back: No rigidity or tenderness  Skin:     Findings: No rash  Neurological:      Mental Status: She is alert and oriented to person, place, and time  Motor: No weakness     Psychiatric:         Behavior: Behavior normal          Judgment: Judgment normal

## 2023-03-29 ENCOUNTER — OFFICE VISIT (OUTPATIENT)
Dept: FAMILY MEDICINE CLINIC | Facility: HOSPITAL | Age: 28
End: 2023-03-29

## 2023-03-29 VITALS
OXYGEN SATURATION: 98 % | HEART RATE: 90 BPM | DIASTOLIC BLOOD PRESSURE: 70 MMHG | HEIGHT: 67 IN | BODY MASS INDEX: 45.99 KG/M2 | SYSTOLIC BLOOD PRESSURE: 118 MMHG | WEIGHT: 293 LBS

## 2023-03-29 DIAGNOSIS — E66.01 CLASS 3 SEVERE OBESITY DUE TO EXCESS CALORIES WITHOUT SERIOUS COMORBIDITY WITH BODY MASS INDEX (BMI) OF 45.0 TO 49.9 IN ADULT (HCC): ICD-10-CM

## 2023-03-29 DIAGNOSIS — J45.30 MILD PERSISTENT ASTHMA WITHOUT COMPLICATION: Primary | ICD-10-CM

## 2023-03-29 DIAGNOSIS — F98.8 ATTENTION DEFICIT DISORDER (ADD) WITHOUT HYPERACTIVITY: Chronic | ICD-10-CM

## 2023-03-29 DIAGNOSIS — J45.20 MILD INTERMITTENT ASTHMA WITHOUT COMPLICATION: ICD-10-CM

## 2023-03-29 RX ORDER — LEVALBUTEROL TARTRATE 45 UG/1
2 AEROSOL, METERED ORAL EVERY 6 HOURS PRN
Qty: 45 G | Refills: 1 | Status: SHIPPED | OUTPATIENT
Start: 2023-03-29

## 2023-03-29 NOTE — ASSESSMENT & PLAN NOTE
Ran out of levalbuteroll inhaler- will refill through mail order- has shaky issues with albuterol   last falre was random

## 2023-03-29 NOTE — PROGRESS NOTES
Assessment/Plan:     Diagnosis ICD-10-CM Associated Orders   1  Mild persistent asthma without complication  N34 17       2  Attention deficit disorder (ADD) without hyperactivity  F98 8       3  Class 3 severe obesity due to excess calories without serious comorbidity with body mass index (BMI) of 45 0 to 49 9 in adult [facility])  E66 01 Ambulatory Referral to Weight Management    Z68 42       4  Mild intermittent asthma without complication  P42 83 levalbuterol (Xopenex HFA) 45 mcg/act inhaler          Problem List Items Addressed This Visit        Respiratory    Mild persistent asthma without complication - Primary      Ran out of levalbuteroll inhaler- will refill through mail order- has shaky issues with albuterol   last falre was random         Relevant Medications    levalbuterol (Xopenex HFA) 45 mcg/act inhaler       Other    ADD (attention deficit disorder) (Chronic)     Has not noticed much trouble now- caring for 4 children          Class 3 severe obesity in [facility])     Weight loss program offered- if not working will consider ozempic or other covered meds         Relevant Orders    Ambulatory Referral to Weight Management   Other Visit Diagnoses     Mild intermittent asthma without complication        Relevant Medications    levalbuterol (Xopenex HFA) 45 mcg/act inhaler            Return in about 2 months (around 5/29/2023) for Recheck  Subjective:    Patient ID: Ruthie Almendarez is a 24 y o  female    Here for followup  Seen by gyn in September       The following portions of the patient's history were reviewed and updated as appropriate: allergies, current medications and problem list      Review of Systems   HENT: Positive for congestion  Respiratory: Positive for cough  Cardiovascular: Negative for chest pain and palpitations  Gastrointestinal: Negative for constipation and diarrhea  Neurological: Negative for dizziness     Psychiatric/Behavioral:        Denies concentration problems "currently wi- had been on meds while in school in past   All other systems reviewed and are negative  Objective:      Current Outpatient Medications:   •  levalbuterol (Xopenex HFA) 45 mcg/act inhaler, Inhale 2 puffs every 6 (six) hours as needed for wheezing Intolerant to albuterol, Disp: 45 g, Rfl: 1  •  zolpidem (AMBIEN) 5 mg tablet, Take 1 tablet (5 mg total) by mouth daily at bedtime as needed for sleep, Disp: 90 tablet, Rfl: 0    Blood pressure 118/70, pulse 90, height 5' 6 5\" (1 689 m), weight (!) 142 kg (312 lb 3 2 oz), SpO2 98 %  Physical Exam  Vitals and nursing note reviewed  Constitutional:       General: She is not in acute distress  Appearance: She is obese  HENT:      Head: Normocephalic  Right Ear: Tympanic membrane normal  There is no impacted cerumen  Left Ear: Tympanic membrane normal  There is no impacted cerumen  Nose: No congestion  Mouth/Throat:      Pharynx: No oropharyngeal exudate or posterior oropharyngeal erythema  Eyes:      General: No scleral icterus  Right eye: No discharge  Left eye: No discharge  Cardiovascular:      Rate and Rhythm: Normal rate and regular rhythm  Heart sounds: No murmur heard  Pulmonary:      Breath sounds: No wheezing or rhonchi  Abdominal:      General: Abdomen is flat  Palpations: Abdomen is soft  Tenderness: There is no abdominal tenderness  Hernia: No hernia is present  Musculoskeletal:         General: No swelling or tenderness  Cervical back: No rigidity or tenderness  Skin:     Coloration: Skin is not jaundiced  Findings: No rash  Neurological:      Mental Status: She is alert and oriented to person, place, and time  Motor: No weakness     Psychiatric:         Behavior: Behavior normal          Judgment: Judgment normal         "

## 2023-05-30 ENCOUNTER — OFFICE VISIT (OUTPATIENT)
Dept: FAMILY MEDICINE CLINIC | Facility: HOSPITAL | Age: 28
End: 2023-05-30

## 2023-05-30 VITALS
BODY MASS INDEX: 45.99 KG/M2 | DIASTOLIC BLOOD PRESSURE: 70 MMHG | WEIGHT: 293 LBS | HEART RATE: 88 BPM | OXYGEN SATURATION: 98 % | HEIGHT: 67 IN | SYSTOLIC BLOOD PRESSURE: 110 MMHG

## 2023-05-30 DIAGNOSIS — E66.01 CLASS 3 SEVERE OBESITY DUE TO EXCESS CALORIES WITHOUT SERIOUS COMORBIDITY WITH BODY MASS INDEX (BMI) OF 45.0 TO 49.9 IN ADULT (HCC): Primary | ICD-10-CM

## 2023-05-30 DIAGNOSIS — F51.01 PRIMARY INSOMNIA: ICD-10-CM

## 2023-05-30 DIAGNOSIS — F41.0 PANIC DISORDER: ICD-10-CM

## 2023-05-30 DIAGNOSIS — E66.01 CLASS 3 SEVERE OBESITY DUE TO EXCESS CALORIES WITHOUT SERIOUS COMORBIDITY WITH BODY MASS INDEX (BMI) OF 45.0 TO 49.9 IN ADULT (HCC): ICD-10-CM

## 2023-05-30 DIAGNOSIS — J45.30 MILD PERSISTENT ASTHMA WITHOUT COMPLICATION: ICD-10-CM

## 2023-05-30 DIAGNOSIS — J45.20 MILD INTERMITTENT ASTHMA WITHOUT COMPLICATION: ICD-10-CM

## 2023-05-30 DIAGNOSIS — G43.109 MIGRAINE WITH AURA AND WITHOUT STATUS MIGRAINOSUS, NOT INTRACTABLE: ICD-10-CM

## 2023-05-30 RX ORDER — FLUTICASONE FUROATE AND VILANTEROL 100; 25 UG/1; UG/1
1 POWDER RESPIRATORY (INHALATION) DAILY
Qty: 3 EACH | Refills: 3 | Status: SHIPPED | OUTPATIENT
Start: 2023-05-30

## 2023-05-30 RX ORDER — ALBUTEROL SULFATE 90 UG/1
2 AEROSOL, METERED RESPIRATORY (INHALATION) EVERY 6 HOURS PRN
Qty: 6.7 G | Refills: 5 | Status: SHIPPED | OUTPATIENT
Start: 2023-05-30

## 2023-05-30 RX ORDER — ALPRAZOLAM 0.25 MG/1
0.25 TABLET ORAL 2 TIMES DAILY PRN
Qty: 20 TABLET | Refills: 0 | Status: SHIPPED | OUTPATIENT
Start: 2023-05-30

## 2023-05-30 RX ORDER — ONDANSETRON 4 MG/1
4 TABLET, FILM COATED ORAL EVERY 8 HOURS PRN
Qty: 30 TABLET | Refills: 0 | Status: SHIPPED | OUTPATIENT
Start: 2023-05-30

## 2023-05-30 RX ORDER — ZOLPIDEM TARTRATE 5 MG/1
5 TABLET ORAL
Qty: 90 TABLET | Refills: 0 | Status: SHIPPED | OUTPATIENT
Start: 2023-05-30

## 2023-05-30 RX ORDER — RIZATRIPTAN BENZOATE 10 MG/1
10 TABLET ORAL ONCE AS NEEDED
Qty: 10 TABLET | Refills: 5 | Status: SHIPPED | OUTPATIENT
Start: 2023-05-30

## 2023-05-30 NOTE — PROGRESS NOTES
Assessment/Plan:     Diagnosis ICD-10-CM Associated Orders   1  Class 3 severe obesity due to excess calories without serious comorbidity with body mass index (BMI) of 45 0 to 49 9 in adult ([facility])  E66 01 semaglutide, 0 25 or 0 5 mg/dose, (Ozempic, 0 25 or 0 5 MG/DOSE,) 2 mg/3 mL injection pen    Z68 42       2  Mild persistent asthma without complication  G88 18       3  Mild intermittent asthma without complication  K22 60 Fluticasone Furoate-Vilanterol (Breo Ellipta) 100-25 mcg/actuation inhaler     albuterol (Proventil HFA) 90 mcg/act inhaler      4  Panic disorder  F41 0 ALPRAZolam (XANAX) 0 25 mg tablet      5  Primary insomnia  F51 01 zolpidem (AMBIEN) 5 mg tablet      6   Migraine with aura and without status migrainosus, not intractable  G43 109 ondansetron (ZOFRAN) 4 mg tablet     rizatriptan (MAXALT) 10 mg tablet          Problem List Items Addressed This Visit        Respiratory    Mild persistent asthma without complication     Had cold symptoms  Before vacation- did not get her inhalers from [facility]         Relevant Medications    Fluticasone Furoate-Vilanterol (Breo Ellipta) 100-25 mcg/actuation inhaler    albuterol (Proventil HFA) 90 mcg/act inhaler       Cardiovascular and Mediastinum    Migraine with aura and without status migrainosus, not intractable    Relevant Medications    ondansetron (ZOFRAN) 4 mg tablet    rizatriptan (MAXALT) 10 mg tablet       Other    Panic disorder    Relevant Medications    ALPRAZolam (XANAX) 0 25 mg tablet    zolpidem (AMBIEN) 5 mg tablet    Class 3 severe obesity in adult [facility]) - Primary    Relevant Medications    semaglutide, 0 25 or 0 5 mg/dose, (Ozempic, 0 25 or 0 5 MG/DOSE,) 2 mg/3 mL injection pen    Primary insomnia    Relevant Medications    zolpidem (AMBIEN) 5 mg tablet   Other Visit Diagnoses     Mild intermittent asthma without complication        Relevant Medications    Fluticasone Furoate-Vilanterol (Breo Ellipta) 100-25 mcg/actuation inhaler albuterol (Proventil HFA) 90 mcg/act inhaler            No follow-ups on file  Subjective:    Patient ID: Rukhsana Reno is a 24 y o  female    Frustrated  with weight issues- has been exercising -walking  [place] on [place] in [place]   Is doing the bariatrics appot  Next month has  Had gyn and pelvic us- no evidence of pcos    will try ozempic and start at low dose  Migraines- gets  Around menses- had less frequnet now off the  BCP-  Uses prn maxal t and zofran- will place orders      The following portions of the patient's history were reviewed and updated as appropriate: allergies, current medications and problem list      Review of Systems   HENT: Negative for congestion and ear pain  Gastrointestinal: Negative for blood in stool  Musculoskeletal: Negative for arthralgias and joint swelling  Ongoing knee paina dn back pain issues   having some plantar fasciitis    Skin: Positive for rash  In  abdomnal folds and under breaset and aaxillary regions   All other systems reviewed and are negative          Objective:      Current Outpatient Medications:   •  albuterol (Proventil HFA) 90 mcg/act inhaler, Inhale 2 puffs every 6 (six) hours as needed for wheezing, Disp: 6 7 g, Rfl: 5  •  ALPRAZolam (XANAX) 0 25 mg tablet, Take 1 tablet (0 25 mg total) by mouth 2 (two) times a day as needed for anxiety (panic attacks), Disp: 20 tablet, Rfl: 0  •  Fluticasone Furoate-Vilanterol (Breo Ellipta) 100-25 mcg/actuation inhaler, Inhale 1 puff daily Rinse mouth after use , Disp: 3 each, Rfl: 3  •  levalbuterol (Xopenex HFA) 45 mcg/act inhaler, Inhale 2 puffs every 6 (six) hours as needed for wheezing Intolerant to albuterol, Disp: 45 g, Rfl: 1  •  ondansetron (ZOFRAN) 4 mg tablet, Take 1 tablet (4 mg total) by mouth every 8 (eight) hours as needed for nausea or vomiting, Disp: 30 tablet, Rfl: 0  •  rizatriptan (MAXALT) 10 mg tablet, Take 1 tablet (10 mg total) by mouth once as needed "for migraine for up to 1 dose may repeat in 2 hours if necessary, Disp: 10 tablet, Rfl: 5  •  semaglutide, 0 25 or 0 5 mg/dose, (Ozempic, 0 25 or 0 5 MG/DOSE,) 2 mg/3 mL injection pen, Inject 0 38 mL (0 2533 mg total) under the skin every 7 days for 28 days, THEN 0 75 mL (0 5 mg total) every 7 days  Send to [facility]  , Disp: 9 mL, Rfl: 0  •  zolpidem (AMBIEN) 5 mg tablet, Take 1 tablet (5 mg total) by mouth daily at bedtime as needed for sleep, Disp: 90 tablet, Rfl: 0    Blood pressure 110/70, pulse 88, height 5' 6 5\" (1 689 m), weight (!) 142 kg (313 lb 6 4 oz), SpO2 98 %  Physical Exam  Vitals and nursing note reviewed  Constitutional:       General: She is not in acute distress  Appearance: She is obese  HENT:      Head: Normocephalic  Right Ear: Tympanic membrane normal       Left Ear: Tympanic membrane and external ear normal       Nose: No congestion  Mouth/Throat:      Pharynx: No oropharyngeal exudate or posterior oropharyngeal erythema  Eyes:      General: No scleral icterus  Right eye: No discharge  Left eye: No discharge  Cardiovascular:      Rate and Rhythm: Normal rate and regular rhythm  Pulmonary:      Breath sounds: No wheezing or rhonchi  Abdominal:      General: There is no distension  Tenderness: There is no abdominal tenderness  Neurological:      Mental Status: She is alert  Motor: No weakness  Psychiatric:         Mood and Affect: Mood normal          Thought Content:  Thought content normal          Judgment: Judgment normal         "

## 2023-06-07 ENCOUNTER — OFFICE VISIT (OUTPATIENT)
Dept: BARIATRICS | Facility: CLINIC | Age: 28
End: 2023-06-07
Payer: COMMERCIAL

## 2023-06-07 VITALS
SYSTOLIC BLOOD PRESSURE: 124 MMHG | WEIGHT: 293 LBS | HEIGHT: 67 IN | DIASTOLIC BLOOD PRESSURE: 60 MMHG | HEART RATE: 96 BPM | BODY MASS INDEX: 45.99 KG/M2 | RESPIRATION RATE: 16 BRPM

## 2023-06-07 DIAGNOSIS — G43.109 MIGRAINE WITH AURA AND WITHOUT STATUS MIGRAINOSUS, NOT INTRACTABLE: ICD-10-CM

## 2023-06-07 DIAGNOSIS — J45.30 MILD PERSISTENT ASTHMA WITHOUT COMPLICATION: ICD-10-CM

## 2023-06-07 DIAGNOSIS — G47.33 OSA (OBSTRUCTIVE SLEEP APNEA): ICD-10-CM

## 2023-06-07 DIAGNOSIS — E66.01 CLASS 3 SEVERE OBESITY DUE TO EXCESS CALORIES WITHOUT SERIOUS COMORBIDITY WITH BODY MASS INDEX (BMI) OF 45.0 TO 49.9 IN ADULT (HCC): Primary | ICD-10-CM

## 2023-06-07 DIAGNOSIS — L90.6 STRIAE: ICD-10-CM

## 2023-06-07 DIAGNOSIS — R79.89 ELEVATED TESTOSTERONE LEVEL: ICD-10-CM

## 2023-06-07 DIAGNOSIS — E65 BUFFALO HUMP: ICD-10-CM

## 2023-06-07 PROCEDURE — 99214 OFFICE O/P EST MOD 30 MIN: CPT | Performed by: INTERNAL MEDICINE

## 2023-06-07 RX ORDER — SODIUM FLUORIDE1.1%, POTASSIUM NITRATE 5% 5.8; 57.5 MG/ML; MG/ML
GEL, DENTIFRICE DENTAL
COMMUNITY
Start: 2023-05-22

## 2023-06-07 RX ORDER — DEXAMETHASONE 1 MG
TABLET ORAL
Qty: 1 TABLET | Refills: 0 | Status: SHIPPED | OUTPATIENT
Start: 2023-06-07 | End: 2023-06-07

## 2023-06-07 RX ORDER — DEXAMETHASONE 1 MG
TABLET ORAL
Qty: 1 TABLET | Refills: 0 | Status: SHIPPED | OUTPATIENT
Start: 2023-06-07

## 2023-06-07 NOTE — ASSESSMENT & PLAN NOTE
- Discussed options of HealthyCORE-Intensive Lifestyle Intervention Program, Very Low Calorie Diet-VLCD, and Conservative Program and the role of weight loss medications  - Explained the importance of making lifestyle changes first before starting anti-obesity medications  - Patient should demonstrate lifestyle changes first before anti-obesity medication initiated  - Patient is interested in pursuing Conservative Program  - Initial weight loss goal of 5-10% weight loss for improved health as studies have shown this is where we see the greatest impact on improving health and decreasing risk of obesity related conditions  - Weight loss can improve patient's co-morbid conditions and/or prevent weight-related complications  - Stop Bang 2/8  - Labs reviewed: As below  General Recommendations:  Nutrition:  Eat breakfast daily  Do not skip meals  Food log (ie ) www myfitnesspal com, sparkpeople  com, loseit com, calorieking  com, etc  My Net Diary     Practice mindful eating  Be sure to set aside time to eat, eat slowly, and savor your food  Hydration: At least 64oz of water daily  No sugar sweetened beverages  No juice (eat the fruit instead)  Exercise:  Studies have shown that the ideal exercise goal is somewhere between 150 to 300 minutes of moderate intensity exercise a week  Start with exercising 10 minutes every other day and gradually increase physical activity with a goal of at least 150 minutes of moderate intensity exercise a week, divided over at least 3 days a week  An example of this would be exercising 30 minutes a day, 5 days a week  Resistance training can increase muscle mass and increase our resting metabolic rate  FULL BODY resistance training is recommended 2-3 times a week  Do not do this on consecutive days to allow for muscle recovery  Aim for a bare minimum 5000 steps, even on days you do not exercise  Monitoring:   Weigh yourself once a week    Weigh yourself the same time of the day with the same amount of clothing on  Preferably this should be done after waking up, before you eat, and with no clothing or minimal clothing on  Specific Goals:  I recommend that you are evaluated for Cushing's syndrome  Take dexamethasone 1mg at 11pm   Have cortisol level done at 8am the next morning  Follow-up with an endocrinologist     Consider the options we discussed and feel free to reach out  Meal planning with dietician is recommenced  Virtual seminar regarding bariatric surgery  Calorie goal: 2075-3698 calories (Provided with meal plan to follow)      Return visit:  3 months

## 2023-06-07 NOTE — PROGRESS NOTES
Assessment/Plan:  Nita Saldivar was seen today for consult  Diagnoses and all orders for this visit:    Class 3 severe obesity due to excess calories without serious comorbidity with body mass index (BMI) of 45 0 to 49 9 in adult [facility])  -     Ambulatory Referral to Weight Management  -     Ambulatory Referral to Endocrinology  -     Cortisol Level, AM Specimen; Future    Mild persistent asthma without complication    LEE (obstructive sleep apnea)    Migraine with aura and without status migrainosus, not intractable    [place] hump  -     Discontinue: dexamethasone (DECADRON) 1 mg tablet; Take 1 tablet by mouth  at 11pm (23:00) the night before a cortisol level is checked  -     Ambulatory Referral to Endocrinology  -     Cortisol Level, AM Specimen; Future  -     dexamethasone (DECADRON) 1 mg tablet; Take 1 tablet by mouth  at 11pm (23:00) the night before a cortisol level is checked  Striae  -     Discontinue: dexamethasone (DECADRON) 1 mg tablet; Take 1 tablet by mouth  at 11pm (23:00) the night before a cortisol level is checked  -     Ambulatory Referral to Endocrinology  -     Cortisol Level, AM Specimen; Future  -     dexamethasone (DECADRON) 1 mg tablet; Take 1 tablet by mouth  at 11pm (23:00) the night before a cortisol level is checked  Elevated testosterone level  -     Ambulatory Referral to Endocrinology       Migraine with aura and without status migrainosus, not intractable  Discussed that topiramate continues to prevent migraine headaches and may also reduce appetite and cravings  This would be considered off label use for weight loss  We discussed the common as well as rare but severe side effects and the fact that it is teratogenic  Class 3 severe obesity in adult [facility])  - Discussed options of HealthyCORE-Intensive Lifestyle Intervention Program, Very Low Calorie Diet-VLCD, and Conservative Program and the role of weight loss medications    - Explained the importance of making lifestyle changes first before starting anti-obesity medications  - Patient should demonstrate lifestyle changes first before anti-obesity medication initiated  - Patient is interested in pursuing Conservative Program  - Initial weight loss goal of 5-10% weight loss for improved health as studies have shown this is where we see the greatest impact on improving health and decreasing risk of obesity related conditions  - Weight loss can improve patient's co-morbid conditions and/or prevent weight-related complications  - Stop Bang 2/8  - Labs reviewed: As below  General Recommendations:  Nutrition:  Eat breakfast daily  Do not skip meals  Food log (ie ) www myfitnesspal com, sparkpeople  com, loseit com, calorieking  com, etc  My Net Diary     Practice mindful eating  Be sure to set aside time to eat, eat slowly, and savor your food  Hydration: At least 64oz of water daily  No sugar sweetened beverages  No juice (eat the fruit instead)  Exercise:  Studies have shown that the ideal exercise goal is somewhere between 150 to 300 minutes of moderate intensity exercise a week  Start with exercising 10 minutes every other day and gradually increase physical activity with a goal of at least 150 minutes of moderate intensity exercise a week, divided over at least 3 days a week  An example of this would be exercising 30 minutes a day, 5 days a week  Resistance training can increase muscle mass and increase our resting metabolic rate  FULL BODY resistance training is recommended 2-3 times a week  Do not do this on consecutive days to allow for muscle recovery  Aim for a bare minimum 5000 steps, even on days you do not exercise  Monitoring:   Weigh yourself once a week  Weigh yourself the same time of the day with the same amount of clothing on  Preferably this should be done after waking up, before you eat, and with no clothing or minimal clothing on      Specific Goals:  I recommend that you are evaluated for Cushing's syndrome  Take dexamethasone 1mg at 11pm   Have cortisol level done at 8am the next morning  Follow-up with an endocrinologist     Consider the options we discussed and feel free to reach out  Meal planning with dietician is recommenced  Virtual seminar regarding bariatric surgery  Calorie goal: 3986-2501 calories (Provided with meal plan to follow)  Return visit:  3 months       Total time spent reviewing chart, interviewing patient, examining patient, discussing plan, answering all questions, and documentin min        ______________________________________________________________________        Subjective:   Chief Complaint   Patient presents with   • Consult     MWM consult; waist 49in; goal wt 250; stop bang 2-8       HPI: Luci Blum  is a 24 y o  female with history of LEE, asthma, migraine headaches, anxiety, ADD, and excess weight, here to pursue weight loss management  Previous notes and records have been reviewed  HPI  Wt Readings from Last 20 Encounters:   23 (!) 140 kg (307 lb 12 8 oz)   23 (!) 142 kg (313 lb 6 4 oz)   23 (!) 142 kg (312 lb 3 2 oz)   22 (!) 136 kg (300 lb 12 8 oz)   22 (!) 137 kg (302 lb)   03/15/22 135 kg (297 lb)   21 127 kg (280 lb)   21 130 kg (287 lb 3 2 oz)   21 130 kg (285 lb 9 6 oz)   21 129 kg (285 lb)   21 128 kg (282 lb 12 8 oz)   21 127 kg (280 lb 9 6 oz)   21 127 kg (280 lb 3 2 oz)   21 127 kg (281 lb)   21 126 kg (277 lb 12 8 oz)   20 122 kg (270 lb)   20 123 kg (270 lb 12 8 oz)   20 119 kg (263 lb 3 2 oz)   20 118 kg (261 lb)   20 119 kg (261 lb 9 6 oz)     Excess Weight:  Onset:      Highest weight: 313  Current weight: 307  What has been tried:   Phentermine -  No weight loss  Met with dietician who advised her to stop taking it    Exercise: 4 days a week for 1-2 hours   Ozempic declined by "insurance    Contributing factors: Poor Food Choices and Insufficient Physical Activity (portion size may have increased)  Associated symptoms and effects: fatigue and increased joint pain    Food logging:  [employer]  Budget of 1900   1222 calories yesterday  Hunger/Cravings: No  Dining out:  Once or twice a week prior  Has not since she started calorie tracking last week  Hydration:  Water 40-80  Alcohol: 5 drinks a month on average  Smoking:  No  Exercise: Started trampoline exercise a couple of weeks ago  30 min every day  Weight Monitoring:  No  Sleep:  2/8  STOP-BANG Score: 8  Occupation:  Looking for a job teaching    Alprazolam (non in the past month) and zolpidem (twice in the past month)  Does not need to take these often  Past Medical History:   Diagnosis Date   • ADHD (attention deficit hyperactivity disorder)    • Asthma    • Attention and concentration deficit     last assessed: Nov 2, 2016   • Migraine    • Papanicolaou smear 07/23/2020    normal   • Status asthmaticus      Patient denies personal and family history of  pancreatitis, thyroid cancer, MEN-2 tumors  Denies any hx of glaucoma, seizures, kidney stones, gallstones  Denies Hx of CAD, PAD, palpitations, arrhythmia  Denies uncontrolled anxiety or depression, suicidal behavior or thinking , insomnia or sleep disturbance  Past Surgical History:   Procedure Laterality Date   • TONSILLECTOMY     • TOOTH EXTRACTION       The following portions of the patient's history were reviewed and updated as appropriate: allergies, current medications, past family history, past medical history, past social history, past surgical history, and problem list     Review Of Systems:  Review of Systems    Objective:  /60   Pulse 96   Resp 16   Ht 5' 6 5\" (1 689 m)   Wt (!) 140 kg (307 lb 12 8 oz)   BMI 48 94 kg/m²   Physical Exam    Labs and Imaging  Recent labs and imaging have been personally reviewed    Lab Results   Component Value Date " "   HCT 42 5 09/08/2021    HGB 13 4 09/08/2021    MCV 90 09/08/2021     09/08/2021    WBC 8 93 09/08/2021     Lab Results   Component Value Date    AGAP 8 09/08/2021    ALKPHOS 104 09/08/2021    ALT 24 09/08/2021    ANIONGAP 7 07/23/2015    AST 15 09/08/2021    BILITOT 0 7 07/23/2015    BUN 13 09/08/2021    CALCIUM 9 0 09/08/2021     09/08/2021    CO2 25 09/08/2021    CREATININE 0 73 09/08/2021    EGFR 116 09/08/2021    GLUF 85 09/08/2021    K 4 0 09/08/2021     07/23/2015    PROT 6 7 07/23/2015    SODIUM 139 09/08/2021    TBILI 0 68 09/08/2021    TP 7 4 09/08/2021     No results found for: \"HGBA1C\"  Lab Results   Component Value Date    IFF3WYNXMMMT 1 670 08/10/2022     Lab Results   Component Value Date    CHOLESTEROL 158 09/08/2021     Lab Results   Component Value Date    HDL 58 09/08/2021     Lab Results   Component Value Date    TRIG 52 09/08/2021     Lab Results   Component Value Date    LDLCALC 90 09/08/2021     "

## 2023-06-07 NOTE — PATIENT INSTRUCTIONS
General Recommendations:  Nutrition:  Eat breakfast daily  Do not skip meals  Food log (ie ) www myfitnesspal com, sparkpeople  com, loseit com, calorieking  com, etc  My Net Diary     Practice mindful eating  Be sure to set aside time to eat, eat slowly, and savor your food  Hydration: At least 64oz of water daily  No sugar sweetened beverages  No juice (eat the fruit instead)  Exercise:  Studies have shown that the ideal exercise goal is somewhere between 150 to 300 minutes of moderate intensity exercise a week  Start with exercising 10 minutes every other day and gradually increase physical activity with a goal of at least 150 minutes of moderate intensity exercise a week, divided over at least 3 days a week  An example of this would be exercising 30 minutes a day, 5 days a week  Resistance training can increase muscle mass and increase our resting metabolic rate  FULL BODY resistance training is recommended 2-3 times a week  Do not do this on consecutive days to allow for muscle recovery  Aim for a bare minimum 5000 steps, even on days you do not exercise  Monitoring:   Weigh yourself once a week  Weigh yourself the same time of the day with the same amount of clothing on  Preferably this should be done after waking up, before you eat, and with no clothing or minimal clothing on  Specific Goals:  I recommend that you are evaluated for Cushing's syndrome  Take dexamethasone 1mg at 11pm   Have cortisol level done at 8am the next morning  Follow-up with an endocrinologist     Consider the options we discussed and feel free to reach out  Meal planning with dietician is recommenced  Virtual seminar regarding bariatric surgery  Calorie goal: 1481-9414 calories (Provided with meal plan to follow)      Return visit:  3 months

## 2023-06-07 NOTE — ASSESSMENT & PLAN NOTE
Discussed that topiramate continues to prevent migraine headaches and may also reduce appetite and cravings  This would be considered off label use for weight loss  We discussed the common as well as rare but severe side effects and the fact that it is teratogenic

## 2023-06-08 ENCOUNTER — TELEPHONE (OUTPATIENT)
Dept: ENDOCRINOLOGY | Facility: CLINIC | Age: 28
End: 2023-06-08

## 2023-06-20 ENCOUNTER — APPOINTMENT (OUTPATIENT)
Dept: LAB | Facility: HOSPITAL | Age: 28
End: 2023-06-20
Payer: COMMERCIAL

## 2023-06-20 ENCOUNTER — TELEPHONE (OUTPATIENT)
Dept: BARIATRICS | Facility: CLINIC | Age: 28
End: 2023-06-20

## 2023-06-20 DIAGNOSIS — L90.6 STRIAE: ICD-10-CM

## 2023-06-20 DIAGNOSIS — E66.01 CLASS 3 SEVERE OBESITY DUE TO EXCESS CALORIES WITHOUT SERIOUS COMORBIDITY WITH BODY MASS INDEX (BMI) OF 45.0 TO 49.9 IN ADULT (HCC): ICD-10-CM

## 2023-06-20 DIAGNOSIS — E65 BUFFALO HUMP: ICD-10-CM

## 2023-06-20 LAB — CORTIS AM PEAK SERPL-MCNC: 0.4 UG/DL (ref 6.7–22.6)

## 2023-06-20 PROCEDURE — 36415 COLL VENOUS BLD VENIPUNCTURE: CPT

## 2023-06-20 PROCEDURE — 82533 TOTAL CORTISOL: CPT

## 2023-06-20 NOTE — TELEPHONE ENCOUNTER
----- Message from [facility],  sent at 6/20/2023  4:12 PM EDT -----  Good news    The results of her dexamethasone suppression test were normal

## 2023-07-05 ENCOUNTER — CONSULT (OUTPATIENT)
Dept: ENDOCRINOLOGY | Facility: CLINIC | Age: 28
End: 2023-07-05
Payer: COMMERCIAL

## 2023-07-05 VITALS
HEIGHT: 67 IN | SYSTOLIC BLOOD PRESSURE: 130 MMHG | BODY MASS INDEX: 45.99 KG/M2 | DIASTOLIC BLOOD PRESSURE: 80 MMHG | WEIGHT: 293 LBS | HEART RATE: 52 BPM

## 2023-07-05 DIAGNOSIS — E66.01 CLASS 3 SEVERE OBESITY DUE TO EXCESS CALORIES WITH SERIOUS COMORBIDITY AND BODY MASS INDEX (BMI) OF 40.0 TO 44.9 IN ADULT (HCC): Primary | ICD-10-CM

## 2023-07-05 DIAGNOSIS — Z13.1 SCREENING FOR DIABETES MELLITUS: ICD-10-CM

## 2023-07-05 DIAGNOSIS — N92.6 IRREGULAR MENSES: ICD-10-CM

## 2023-07-05 PROCEDURE — 99244 OFF/OP CNSLTJ NEW/EST MOD 40: CPT | Performed by: INTERNAL MEDICINE

## 2023-07-05 NOTE — PROGRESS NOTES
Chief Complaint   Patient presents with   • Abnormal Endocrine Labs      Referring Provider  Judit Galicia, Do  [address] [place] [address],  [address] [place] [address]     History of Present Illness:   Nik Moss is a 24 y.o. female with elevated testosterone and obesity seen at the request of Bariatrics. They were concerned about elevated free testosterone. Also ordered a 1mg dex suppression with appropriately low cortisol response. Ms Amena Garland is in her usual state of health  She has noted missed menses, will get her periods every few months (3-4x per year). This started in 1135-8848. Reports menses were normal every month until this change. She did take OCPs from age [age]yrs. She took this for birth control and not another reason. When she gets her menses for 2 days  There is no increase in acne, or hair loss. She did have a marked weight gain during this time. Has a hx of migraines exacerbated by OCPs. Of note, had LEE as a child and is s/p tonsillectomy/adenoidectomy at age 13. Works in childcare.    Genetic background includes: native [place], [place], and polish    Patient Active Problem List   Diagnosis   • Mild persistent asthma without complication   • ADD (attention deficit disorder)   • Panic disorder   • Weight gain   • Class 3 severe obesity in adult [facility])   • Migraine with aura and without status migrainosus, not intractable   • LEE (obstructive sleep apnea)   • Primary insomnia   • Anxiety      Past Medical History:   Diagnosis Date   • ADHD (attention deficit hyperactivity disorder)    • Asthma    • Attention and concentration deficit     last assessed: Nov 2, 2016   • Migraine    • Papanicolaou smear 07/23/2020    normal   • Status asthmaticus       Past Surgical History:   Procedure Laterality Date   • TONSILLECTOMY     • TOOTH EXTRACTION        Family History   Problem Relation Age of Onset   • Anxiety disorder Father    • Anxiety disorder Paternal Grandfather    • Thyroid disease Paternal Grandfather    • Throat cancer Paternal Grandfather    • No Known Problems Mother    • No Known Problems Brother    • Breast cancer Maternal Grandmother    • Diabetes unspecified Maternal Grandmother    • No Known Problems Maternal Grandfather    • No Known Problems Paternal Grandmother    • No Known Problems Maternal Aunt    • No Known Problems Paternal Aunt    • Substance Abuse Neg Hx    • Mental illness Neg Hx      Social History     Tobacco Use   • Smoking status: Never   • Smokeless tobacco: Never   Substance Use Topics   • Alcohol use:  Yes     Alcohol/week: 1.0 standard drink of alcohol     Types: 1 Cans of beer per week     No Known Allergies      Current Outpatient Medications:   •  albuterol (Proventil HFA) 90 mcg/act inhaler, Inhale 2 puffs every 6 (six) hours as needed for wheezing, Disp: 6.7 g, Rfl: 5  •  ALPRAZolam (XANAX) 0.25 mg tablet, Take 1 tablet (0.25 mg total) by mouth 2 (two) times a day as needed for anxiety (panic attacks), Disp: 20 tablet, Rfl: 0  •  Fluticasone Furoate-Vilanterol (Breo Ellipta) 100-25 mcg/actuation inhaler, Inhale 1 puff daily Rinse mouth after use., Disp: 3 each, Rfl: 3  •  levalbuterol (Xopenex HFA) 45 mcg/act inhaler, Inhale 2 puffs every 6 (six) hours as needed for wheezing Intolerant to albuterol, Disp: 45 g, Rfl: 1  •  Multiple Vitamins-Minerals (ONE-A-DAY WOMENS PO), Take by mouth, Disp: , Rfl:   •  ondansetron (ZOFRAN) 4 mg tablet, Take 1 tablet (4 mg total) by mouth every 8 (eight) hours as needed for nausea or vomiting, Disp: 30 tablet, Rfl: 0  •  rizatriptan (MAXALT) 10 mg tablet, Take 1 tablet (10 mg total) by mouth once as needed for migraine for up to 1 dose may repeat in 2 hours if necessary, Disp: 10 tablet, Rfl: 5  •  Sodium Fluoride 5000 Sensitive 1.1-5 % GEL, BRUSH ONCE DAILY (DO NOT RINSE FOR 30 MINUTES), Disp: , Rfl:   •  zolpidem (AMBIEN) 5 mg tablet, Take 1 tablet (5 mg total) by mouth daily at bedtime as needed for sleep, Disp: 90 tablet, Rfl: 0  Review of Systems   Constitutional: Positive for unexpected weight change. HENT: Positive for postnasal drip. Negative for trouble swallowing and voice change. Eyes: Positive for visual disturbance. Respiratory: Negative for cough and shortness of breath. Cardiovascular: Negative for chest pain and palpitations. Gastrointestinal: Negative for constipation, diarrhea and nausea. Endocrine: Positive for heat intolerance. Negative for cold intolerance, polydipsia and polyuria. Genitourinary: Positive for menstrual problem. Musculoskeletal: Negative for arthralgias, gait problem and myalgias. Skin:        See HPI   Neurological: Positive for headaches. Negative for tremors and weakness. Psychiatric/Behavioral: Positive for sleep disturbance (insomnia with menses). The patient is not nervous/anxious. All other systems reviewed and are negative. Physical Exam:  Body mass index is 49.92 kg/m².   /80 (BP Location: Left arm, Patient Position: Sitting, Cuff Size: Large)   Pulse (!) 52   Ht 5' 6.5" (1.689 m)   Wt (!) 142 kg (314 lb)   BMI 49.92 kg/m²    Wt Readings from Last 3 Encounters:   07/05/23 (!) 142 kg (314 lb)   06/07/23 (!) 140 kg (307 lb 12.8 oz)   05/30/23 (!) 142 kg (313 lb 6.4 oz)       GEN: NAD  E/n/m nl facies, hearing intact bilat, tongue midline, lips nl  Eyes: no stare or proptosis, nl lids, EOMI  Neck: trachea midline, thyroid NT to palpation, nl in size, no nodules or neck masses noted, no cervical LAD  CV; heart reg rate s1s2 nl, no m/r/g appreciated, no RADHA  Resp: CTAB, good effort  Ab+BS  Neuro: no tremor, UTO DTRs in BUE  MS: no c/c in digits, moves all 4 ext, nl muscle bulk, gait nl  Skin: warm and dry, no palmar erythema; pale, flat striae on abdomen, but no increased androgen dependent hair growth pr acne  Psych: nl mood and affect, no gross lapses in memory    DATA:  Labs:       No results found for: "TSH", "FREET4", "TSI"      Radiology    Impression:  1. Class 3 severe obesity due to excess calories with serious comorbidity and body mass index (BMI) of 40.0 to 44.9 in [facility])    2. Irregular menses    3. Screening for diabetes mellitus           Plan:    Edgar Cline was seen today for abnormal endocrine labs. Diagnoses and all orders for this visit:    Class 3 severe obesity due to excess calories with serious comorbidity and body mass index (BMI) of 40.0 to 44.9 in [facility])  -     Basic metabolic panel; Future  -     Cancel: Hemoglobin A1C; Future  -     Hemoglobin A1C; Future    Irregular menses  -     DHEA-sulfate- Lab Collect; Future  -     17-Hydroxyprogesterone- Lab Collect; Future  -     Testosterone, free, total Lab Collect; Future    Screening for diabetes mellitus  -     Basic metabolic panel; Future  -     Cancel: Hemoglobin A1C; Future  -     Hemoglobin A1C; Future        1. Elevated testosterone: Free testo is the only elevated, but she does have irregular menses. Will repeat this and check 17 OHP and DHEAs. Suspect PCOS so I will also screen for fasting glucose and A1c.     2. Obesity: Appropriate response to 1mg overnight dex suppression. No additional hypercortisolemia evaluation needed    Phone or MyChart to communicate. Discussed with the patient and all questioned fully answered. She will call me if any problems arise.         Wilfredo Morocho MD

## 2023-07-06 ENCOUNTER — APPOINTMENT (OUTPATIENT)
Dept: LAB | Facility: HOSPITAL | Age: 28
End: 2023-07-06
Payer: COMMERCIAL

## 2023-07-06 DIAGNOSIS — E66.01 CLASS 3 SEVERE OBESITY DUE TO EXCESS CALORIES WITH SERIOUS COMORBIDITY AND BODY MASS INDEX (BMI) OF 40.0 TO 44.9 IN ADULT (HCC): ICD-10-CM

## 2023-07-06 DIAGNOSIS — Z13.1 SCREENING FOR DIABETES MELLITUS: ICD-10-CM

## 2023-07-06 DIAGNOSIS — N92.6 IRREGULAR MENSES: ICD-10-CM

## 2023-07-06 LAB
ANION GAP SERPL CALCULATED.3IONS-SCNC: 5 MMOL/L
BUN SERPL-MCNC: 12 MG/DL (ref 5–25)
CALCIUM SERPL-MCNC: 9 MG/DL (ref 8.4–10.2)
CHLORIDE SERPL-SCNC: 105 MMOL/L (ref 96–108)
CO2 SERPL-SCNC: 26 MMOL/L (ref 21–32)
CREAT SERPL-MCNC: 0.67 MG/DL (ref 0.6–1.3)
GFR SERPL CREATININE-BSD FRML MDRD: 122 ML/MIN/1.73SQ M
GLUCOSE P FAST SERPL-MCNC: 91 MG/DL (ref 65–99)
POTASSIUM SERPL-SCNC: 4 MMOL/L (ref 3.5–5.3)
SODIUM SERPL-SCNC: 136 MMOL/L (ref 135–147)

## 2023-07-06 PROCEDURE — 36415 COLL VENOUS BLD VENIPUNCTURE: CPT

## 2023-07-06 PROCEDURE — 84402 ASSAY OF FREE TESTOSTERONE: CPT

## 2023-07-06 PROCEDURE — 82627 DEHYDROEPIANDROSTERONE: CPT

## 2023-07-06 PROCEDURE — 83036 HEMOGLOBIN GLYCOSYLATED A1C: CPT

## 2023-07-06 PROCEDURE — 80048 BASIC METABOLIC PNL TOTAL CA: CPT

## 2023-07-06 PROCEDURE — 84403 ASSAY OF TOTAL TESTOSTERONE: CPT

## 2023-07-06 PROCEDURE — 83498 ASY HYDROXYPROGESTERONE 17-D: CPT

## 2023-07-08 LAB
DHEA-S SERPL-MCNC: 304 UG/DL (ref 84.8–378)
EST. AVERAGE GLUCOSE BLD GHB EST-MCNC: 100 MG/DL
HBA1C MFR BLD: 5.1 %
TESTOST FREE SERPL-MCNC: 4.2 PG/ML (ref 0–4.2)
TESTOST SERPL-MCNC: 40 NG/DL (ref 13–71)

## 2023-07-10 LAB — 17OHP SERPL-MCNC: 192 NG/DL

## 2023-08-16 ENCOUNTER — TELEPHONE (OUTPATIENT)
Dept: OBGYN CLINIC | Facility: CLINIC | Age: 28
End: 2023-08-16

## 2023-08-16 NOTE — TELEPHONE ENCOUNTER
Spoke with patient and informed her that she has an outstanding pelvic ultrasound to be done. Central scheduling phone number given to pt. Patient also transferred to  after expressing need for wellness visit; last wellness was 8/9/22. Patient verbalized understanding and voiced appreciation for phone call.

## 2023-09-26 ENCOUNTER — OFFICE VISIT (OUTPATIENT)
Dept: BARIATRICS | Facility: CLINIC | Age: 28
End: 2023-09-26
Payer: COMMERCIAL

## 2023-09-26 VITALS
HEIGHT: 66 IN | HEART RATE: 101 BPM | DIASTOLIC BLOOD PRESSURE: 72 MMHG | WEIGHT: 293 LBS | RESPIRATION RATE: 16 BRPM | SYSTOLIC BLOOD PRESSURE: 114 MMHG | BODY MASS INDEX: 47.09 KG/M2

## 2023-09-26 DIAGNOSIS — J45.30 MILD PERSISTENT ASTHMA WITHOUT COMPLICATION: ICD-10-CM

## 2023-09-26 DIAGNOSIS — G47.33 OSA (OBSTRUCTIVE SLEEP APNEA): ICD-10-CM

## 2023-09-26 DIAGNOSIS — G43.109 MIGRAINE WITH AURA AND WITHOUT STATUS MIGRAINOSUS, NOT INTRACTABLE: ICD-10-CM

## 2023-09-26 DIAGNOSIS — E66.01 CLASS 3 SEVERE OBESITY DUE TO EXCESS CALORIES WITHOUT SERIOUS COMORBIDITY WITH BODY MASS INDEX (BMI) OF 50.0 TO 59.9 IN ADULT (HCC): Primary | ICD-10-CM

## 2023-09-26 PROCEDURE — 99214 OFFICE O/P EST MOD 30 MIN: CPT | Performed by: INTERNAL MEDICINE

## 2023-09-26 RX ORDER — DIAZEPAM 5 MG/1
TABLET ORAL
COMMUNITY
Start: 2023-08-27 | End: 2023-10-05 | Stop reason: ALTCHOICE

## 2023-09-26 RX ORDER — TOPIRAMATE 50 MG/1
50 TABLET, FILM COATED ORAL EVERY EVENING
Qty: 90 TABLET | Refills: 0 | Status: SHIPPED | OUTPATIENT
Start: 2023-09-26

## 2023-09-26 NOTE — PROGRESS NOTES
Assessment/Plan:  Heather Franco was seen today for follow-up. Diagnoses and all orders for this visit:    Class 3 severe obesity due to excess calories without serious comorbidity with body mass index (BMI) of 50.0 to 59.9 in adult (HCC)  -     topiramate (Topamax) 50 MG tablet; Take 1 tablet (50 mg total) by mouth every evening    LEE (obstructive sleep apnea)    Mild persistent asthma without complication    Migraine with aura and without status migrainosus, not intractable  -     topiramate (Topamax) 50 MG tablet; Take 1 tablet (50 mg total) by mouth every evening     Advised to resume calorie tracking. Advised to do this each day for 1 week then 1 or 2 days a week thereafter. She wishes to start on topiramate at this time. I discussed with her that this medication is teratogenic and she is not to get pregnant while on it. She tells me that she is abstinent and this is not a concern. Demonstrate understanding that she is to either come off the medication for being on reliable birthcontrol as advise by her gynecologist should this change. Patient was straight understanding and read and signed our medication use agreement and topiramate use agreement. General Recommendations:  Nutrition:  Eat breakfast daily. Do not skip meals. Food log (ie.) www.[employer].com, sparkpeople. com, loseit.com, calorieking. com, etc. My Net Diary      Practice mindful eating. Be sure to set aside time to eat, eat slowly, and savor your food. Hydration: At least 64oz of water daily. No sugar sweetened beverages. No juice (eat the fruit instead). Exercise:  Studies have shown that the ideal exercise goal is somewhere between 150 to 300 minutes of moderate intensity exercise a week. Start with exercising 10 minutes every other day and gradually increase physical activity with a goal of at least 150 minutes of moderate intensity exercise a week, divided over at least 3 days a week.   An example of this would be exercising 30 minutes a day, 5 days a week. Resistance training can increase muscle mass and increase our resting metabolic rate. FULL BODY resistance training is recommended 2-3 times a week. Do not do this on consecutive days to allow for muscle recovery. Aim for a bare minimum 5000 steps, even on days you do not exercise. Monitoring:   Weigh yourself once a week. Weigh yourself the same time of the day with the same amount of clothing on. Preferably this should be done after waking up, before you eat, and with no clothing or minimal clothing on. Specific Goals:    Start topiramate 1/2 tablet (25mg) by mouth each evening for 7 days, then increase to 1 tablet (50mg) by mouth each evening thereafter  Potential side effects of Topamax (topiramate) include: numbness or tingling, fatigue, depression/anxiety, changes in taste, abdominal upset/heartburn, trouble sleeping, and may reduce sweating - stay well hydrated to avoid overheating. May reduce the effectiveness of hormonal birth control - backup method such as condoms recommended to avoid pregnancy. Consider the options we discussed and feel free to reach out. Meal planning with dietician is recommenced. Virtual seminar regarding bariatric surgery. Calorie goal: 4756-0014 calories (Provided with meal plan to follow). Return visit:  3 months       ______________________________________________________________________        Subjective:   Chief Complaint   Patient presents with   • Follow-up     MWM- 3mth f/u, waist 53in     Patient here to discuss weight associated problems and nutrition goals  HPI: Saray Gracia  is a 24 y.o. female with history of Asthma , LEE not on CPAP, migraines, and excess weight. Weight loss plan:  Conservative Program.   Most recent notes and records were reviewed.     Wt Readings from Last 10 Encounters:   09/26/23 (!) 144 kg (317 lb)   07/05/23 (!) 142 kg (314 lb)   06/07/23 (!) 140 kg (307 lb 12.8 oz)   05/30/23 (!) 142 kg (313 lb 6.4 oz)   03/29/23 (!) 142 kg (312 lb 3.2 oz)   09/29/22 (!) 136 kg (300 lb 12.8 oz)   08/09/22 (!) 137 kg (302 lb)   03/15/22 135 kg (297 lb)   11/16/21 127 kg (280 lb)   09/17/21 130 kg (287 lb 3.2 oz)     Gained 10lbs  Stopped calorie tracking and has not been walking as much. Highest weight/Current weight 317    Patient denies personal and family history of  pancreatitis, thyroid cancer, MEN-2 tumors. Denies any hx of glaucoma, seizures, kidney stones, gallstones. Denies Hx of CAD, PAD, palpitations, arrhythmia. Denies uncontrolled anxiety or depression, suicidal ideation or behavior, insomnia or sleep disturbance. The following portions of the patient's history were reviewed and updated as appropriate: allergies, current medications, past family history, past medical history, past social history, past surgical history, and problem list.    Review Of Systems:  Review of Systems   Constitutional: Negative for activity change, appetite change, fatigue and fever. Respiratory: Negative for cough and shortness of breath. Cardiovascular: Negative for chest pain, palpitations and leg swelling. Gastrointestinal: Negative for abdominal pain, constipation, diarrhea, nausea and vomiting. Endocrine: Negative for cold intolerance and heat intolerance. Genitourinary: Negative for difficulty urinating and dysuria. Musculoskeletal: Negative for arthralgias, back pain and gait problem. Skin: Negative for pallor and rash. Neurological: Negative for headaches. Psychiatric/Behavioral: Negative for dysphoric mood, sleep disturbance and suicidal ideas (or HI). The patient is not nervous/anxious. Objective:  /72   Pulse 101   Resp 16   Ht 5' 6" (1.676 m)   Wt (!) 144 kg (317 lb)   BMI 51.17 kg/m²   NAD, breathing comfortably and speaking in full sentences. Normal mood and affect. Normal behavior and thought content.     Labs and Imaging  Recent labs and imaging have been personally reviewed.   Lab Results   Component Value Date    WBC 8.93 09/08/2021    HGB 13.4 09/08/2021    HCT 42.5 09/08/2021    MCV 90 09/08/2021     09/08/2021     Lab Results   Component Value Date     07/23/2015    SODIUM 136 07/06/2023    K 4.0 07/06/2023     07/06/2023    CO2 26 07/06/2023    ANIONGAP 7 07/23/2015    AGAP 5 07/06/2023    BUN 12 07/06/2023    CREATININE 0.67 07/06/2023    GLUF 91 07/06/2023    CALCIUM 9.0 07/06/2023    AST 15 09/08/2021    ALT 24 09/08/2021    ALKPHOS 104 09/08/2021    PROT 6.7 07/23/2015    TP 7.4 09/08/2021    BILITOT 0.7 07/23/2015    TBILI 0.68 09/08/2021    EGFR 122 07/06/2023     Lab Results   Component Value Date    HGBA1C 5.1 07/06/2023     Lab Results   Component Value Date    SIN1FWPQTBQJ 1.670 08/10/2022     Lab Results   Component Value Date    CHOLESTEROL 158 09/08/2021     Lab Results   Component Value Date    HDL 58 09/08/2021     Lab Results   Component Value Date    TRIG 52 09/08/2021     Lab Results   Component Value Date    LDLCALC 90 09/08/2021

## 2023-09-26 NOTE — PATIENT INSTRUCTIONS
General Recommendations:  Nutrition:  Eat breakfast daily. Do not skip meals. Food log (ie.) www.[employer].com, sparkpeople. com, loseit.com, calorieking. com, etc. My Net Diary      Practice mindful eating. Be sure to set aside time to eat, eat slowly, and savor your food. Hydration: At least 64oz of water daily. No sugar sweetened beverages. No juice (eat the fruit instead). Exercise:  Studies have shown that the ideal exercise goal is somewhere between 150 to 300 minutes of moderate intensity exercise a week. Start with exercising 10 minutes every other day and gradually increase physical activity with a goal of at least 150 minutes of moderate intensity exercise a week, divided over at least 3 days a week. An example of this would be exercising 30 minutes a day, 5 days a week. Resistance training can increase muscle mass and increase our resting metabolic rate. FULL BODY resistance training is recommended 2-3 times a week. Do not do this on consecutive days to allow for muscle recovery. Aim for a bare minimum 5000 steps, even on days you do not exercise. Monitoring:   Weigh yourself once a week. Weigh yourself the same time of the day with the same amount of clothing on. Preferably this should be done after waking up, before you eat, and with no clothing or minimal clothing on. Specific Goals:    Start topiramate 1/2 tablet (25mg) by mouth each evening for 7 days, then increase to 1 tablet (50mg) by mouth each evening thereafter  Potential side effects of Topamax (topiramate) include: numbness or tingling, fatigue, depression/anxiety, changes in taste, abdominal upset/heartburn, trouble sleeping, and may reduce sweating - stay well hydrated to avoid overheating. May reduce the effectiveness of hormonal birth control - backup method such as condoms recommended to avoid pregnancy. Consider the options we discussed and feel free to reach out.      Meal planning with dietician is recommenced. Virtual seminar regarding bariatric surgery. Calorie goal: 0229-3258 calories (Provided with meal plan to follow).      Return visit:  3 months

## 2023-10-03 ENCOUNTER — OFFICE VISIT (OUTPATIENT)
Dept: FAMILY MEDICINE CLINIC | Facility: HOSPITAL | Age: 28
End: 2023-10-03
Payer: COMMERCIAL

## 2023-10-03 VITALS
SYSTOLIC BLOOD PRESSURE: 116 MMHG | DIASTOLIC BLOOD PRESSURE: 70 MMHG | BODY MASS INDEX: 47.09 KG/M2 | HEIGHT: 66 IN | OXYGEN SATURATION: 97 % | HEART RATE: 85 BPM | WEIGHT: 293 LBS

## 2023-10-03 DIAGNOSIS — F98.8 ATTENTION DEFICIT DISORDER (ADD) WITHOUT HYPERACTIVITY: Chronic | ICD-10-CM

## 2023-10-03 DIAGNOSIS — Z23 NEED FOR IMMUNIZATION AGAINST INFLUENZA: ICD-10-CM

## 2023-10-03 DIAGNOSIS — F41.9 ANXIETY: ICD-10-CM

## 2023-10-03 DIAGNOSIS — F51.01 PRIMARY INSOMNIA: ICD-10-CM

## 2023-10-03 DIAGNOSIS — E66.01 CLASS 3 SEVERE OBESITY DUE TO EXCESS CALORIES WITHOUT SERIOUS COMORBIDITY WITH BODY MASS INDEX (BMI) OF 50.0 TO 59.9 IN ADULT (HCC): ICD-10-CM

## 2023-10-03 DIAGNOSIS — G43.109 MIGRAINE WITH AURA AND WITHOUT STATUS MIGRAINOSUS, NOT INTRACTABLE: ICD-10-CM

## 2023-10-03 DIAGNOSIS — J45.30 MILD PERSISTENT ASTHMA WITHOUT COMPLICATION: Primary | ICD-10-CM

## 2023-10-03 PROCEDURE — 90471 IMMUNIZATION ADMIN: CPT

## 2023-10-03 PROCEDURE — 99215 OFFICE O/P EST HI 40 MIN: CPT | Performed by: INTERNAL MEDICINE

## 2023-10-03 PROCEDURE — 90686 IIV4 VACC NO PRSV 0.5 ML IM: CPT

## 2023-10-03 RX ORDER — SERTRALINE HYDROCHLORIDE 25 MG/1
25 TABLET, FILM COATED ORAL DAILY
Qty: 90 TABLET | Refills: 1 | Status: SHIPPED | OUTPATIENT
Start: 2023-10-03 | End: 2024-09-27

## 2023-10-03 RX ORDER — ZOLPIDEM TARTRATE 5 MG/1
5 TABLET ORAL
Qty: 90 TABLET | Refills: 0 | Status: SHIPPED | OUTPATIENT
Start: 2023-10-03

## 2023-10-03 NOTE — PROGRESS NOTES
Assessment/Plan:     Diagnosis ICD-10-CM Associated Orders   1. Mild persistent asthma without complication  I40.01       2. Migraine with aura and without status migrainosus, not intractable  G43.109       3. Class 3 severe obesity due to excess calories without serious comorbidity with body mass index (BMI) of 50.0 to 59.9 in adult ([facility])  E66.01     Z68.43       4. Anxiety  F41.9       5. Primary insomnia  F51.01 sertraline (ZOLOFT) 25 mg tablet      6. Attention deficit disorder (ADD) without hyperactivity  F98.8           Problem List Items Addressed This Visit          Respiratory    Mild persistent asthma without complication - Primary     No recent flare of asthma- some sore throat            Cardiovascular and Mediastinum    Migraine with aura and without status migrainosus, not intractable     Had daily headache this week- uses med one day and using ibu profen- has just started topamax for weight loss and headaches-          Relevant Medications    sertraline (ZOLOFT) 25 mg tablet       Other    ADD (attention deficit disorder) (Chronic)     Was more of an issues in college- feels she is doing better now         Relevant Medications    sertraline (ZOLOFT) 25 mg tablet    Class 3 severe obesity in adult [facility])     Seeing Dr. 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 No follow-ups on file.       Subjective:    Patient ID: Shena Vallecillo is a 24 y.o. 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     Review of Systems   HENT:  Positive for congestion and sore throat. Respiratory:  Negative for cough and shortness of breath. No flare of asthma   Gastrointestinal:  Negative for constipation. All other systems reviewed and are negative.         Objective:      Current Outpatient Medications:     albuterol (Proventil HFA) 90 mcg/act inhaler, Inhale 2 puffs every 6 (six) hours as needed for wheezing, Disp: 6.7 g, Rfl: 5    diazepam (VALIUM) 5 mg tablet, TAKE 4 TABLETS BY MOUTH 1 HOUR PRIOR TO APPOINTMENT, Disp: , Rfl:     Fluticasone Furoate-Vilanterol (Breo Ellipta) 100-25 mcg/actuation inhaler, Inhale 1 puff daily Rinse mouth after use., Disp: 3 each, Rfl: 3    levalbuterol (Xopenex HFA) 45 mcg/act inhaler, Inhale 2 puffs every 6 (six) hours as needed for wheezing Intolerant to albuterol, Disp: 45 g, Rfl: 1    Multiple Vitamins-Minerals (ONE-A-DAY WOMENS PO), Take by mouth, Disp: , Rfl:     ondansetron (ZOFRAN) 4 mg tablet, Take 1 tablet (4 mg total) by mouth every 8 (eight) hours as needed for nausea or vomiting, Disp: 30 tablet, Rfl: 0    rizatriptan (MAXALT) 10 mg tablet, Take 1 tablet (10 mg total) by mouth once as needed for migraine for up to 1 dose may repeat in 2 hours if necessary, Disp: 10 tablet, Rfl: 5    sertraline (ZOLOFT) 25 mg tablet, Take 1 tablet (25 mg total) by mouth daily, Disp: 90 tablet, Rfl: 1    Sodium Fluoride 5000 Sensitive 1.1-5 % GEL, BRUSH ONCE DAILY (DO NOT RINSE FOR 30 MINUTES), Disp: , Rfl:     topiramate (Topamax) 50 MG tablet, Take 1 tablet (50 mg total) by mouth every evening, Disp: 90 tablet, Rfl: 0    zolpidem (AMBIEN) 5 mg tablet, Take 1 tablet (5 mg total) by mouth daily at bedtime as needed for sleep, Disp: 90 tablet, Rfl: 0    Blood pressure 116/70, pulse 85, height 5' 6" (1.676 m), weight (!) 142 kg (313 lb), SpO2 97 %. Physical Exam  Vitals and nursing note reviewed. Constitutional:       General: She is not in acute distress. Appearance: She is obese. HENT:      Head: Normocephalic. Right Ear: Tympanic membrane normal. There is no impacted cerumen. Left Ear: Tympanic membrane normal. There is no impacted cerumen. Nose: No congestion. Mouth/Throat:      Mouth: Mucous membranes are moist.      Pharynx: No posterior oropharyngeal erythema. Eyes:      General: No scleral icterus. Right eye: No discharge. Left eye: No discharge. Cardiovascular:      Rate and Rhythm: Normal rate and regular rhythm. Heart sounds: No murmur heard. Pulmonary:      Breath sounds: No wheezing or rhonchi. Abdominal:      General: There is no distension. Palpations: Abdomen is soft. Tenderness: There is no abdominal tenderness. Musculoskeletal:      Cervical back: No rigidity or tenderness. Right lower leg: No edema. Left lower leg: No edema. Lymphadenopathy:      Cervical: No cervical adenopathy. Skin:     Findings: No rash. Neurological:      Mental Status: She is alert. Psychiatric:         Mood and Affect: Mood normal.         Thought Content:  Thought content normal.         Judgment: Judgment normal.

## 2023-10-03 NOTE — ASSESSMENT & PLAN NOTE
Had daily headache this week- uses med one day and using ibu profen- has just started topamax for weight loss and headaches-

## 2023-10-03 NOTE — ASSESSMENT & PLAN NOTE
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

## 2023-10-03 NOTE — ASSESSMENT & PLAN NOTE
Seeing Dr. Rene Jeffrey with weight management- to start on Topamax and may add phetermine in future

## 2023-10-05 ENCOUNTER — ANNUAL EXAM (OUTPATIENT)
Dept: OBGYN CLINIC | Facility: CLINIC | Age: 28
End: 2023-10-05
Payer: COMMERCIAL

## 2023-10-05 VITALS
SYSTOLIC BLOOD PRESSURE: 114 MMHG | WEIGHT: 293 LBS | HEIGHT: 67 IN | BODY MASS INDEX: 45.99 KG/M2 | DIASTOLIC BLOOD PRESSURE: 72 MMHG

## 2023-10-05 DIAGNOSIS — Z12.4 ENCOUNTER FOR PAPANICOLAOU SMEAR FOR CERVICAL CANCER SCREENING: ICD-10-CM

## 2023-10-05 DIAGNOSIS — N91.4 SECONDARY OLIGOMENORRHEA: ICD-10-CM

## 2023-10-05 DIAGNOSIS — G43.109 MIGRAINE WITH AURA AND WITHOUT STATUS MIGRAINOSUS, NOT INTRACTABLE: ICD-10-CM

## 2023-10-05 DIAGNOSIS — Z01.419 ENCOUNTER FOR ANNUAL ROUTINE GYNECOLOGICAL EXAMINATION: Primary | ICD-10-CM

## 2023-10-05 DIAGNOSIS — Z01.411 ENCOUNTER FOR GYNECOLOGICAL EXAMINATION WITH ABNORMAL FINDING: ICD-10-CM

## 2023-10-05 PROCEDURE — G0145 SCR C/V CYTO,THINLAYER,RESCR: HCPCS | Performed by: NURSE PRACTITIONER

## 2023-10-05 PROCEDURE — S0612 ANNUAL GYNECOLOGICAL EXAMINA: HCPCS | Performed by: NURSE PRACTITIONER

## 2023-10-05 NOTE — PATIENT INSTRUCTIONS
Pap every 3 years if normal, STI testing as indicated, exercise most days of week, obtain appropriate diet and hydration, Calcium 1000mg + 600 vit D daily,  Annual mammogram starting at age 36, monthly breast self exam  Monitor cycles call if no period every 3 months for Provera

## 2023-10-05 NOTE — PROGRESS NOTES
Assessment/Plan:  Pap every 3 years if normal, STI testing as indicated, exercise most days of week, obtain appropriate diet and hydration, Calcium 1000mg + 600 vit D daily,  Annual mammogram starting at age 36, monthly breast self exam  Monitor cycles call if no period every 3 months for Provera       Diagnoses and all orders for this visit:    Encounter for annual routine gynecological examination    Secondary oligomenorrhea    Migraine with aura and without status migrainosus, not intractable    Encounter for gynecological examination with abnormal finding  -     Liquid-based pap, screening    Encounter for Papanicolaou smear for cervical cancer screening  -     Liquid-based pap, screening          Subjective:      Patient ID: Genesis Cardenas is a 24 y.o. female. Here for annual gyn Periodsir regular 30-46 days Age 14-14 monthly then OCP and monthly Came off due to migraine with aura and have been irreg since Labs last year with elevated testosterone  All others ok. Repeat with endocrine who she just saw at request of bariatrics and  Testosterone normal  Cortisol level nomal.  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    Review of Systems   Constitutional: Negative for fatigue and unexpected weight change. Gastrointestinal: Negative for abdominal distention, abdominal pain, constipation and diarrhea. Genitourinary: Positive for menstrual problem. Negative for difficulty urinating, dyspareunia, dysuria, frequency, genital sores, pelvic pain, urgency, vaginal bleeding, vaginal discharge and vaginal pain. Neurological: Negative for headaches.    Hematological: Negative for adenopathy. Psychiatric/Behavioral: Negative. Negative for dysphoric mood. The patient is not nervous/anxious. Objective:      /72 (BP Location: Left arm, Patient Position: Sitting, Cuff Size: Standard)   Ht 5' 6.75" (1.695 m)   Wt (!) 141 kg (311 lb 9.6 oz)   LMP 09/25/2023 (Exact Date)   Breastfeeding No   BMI 49.17 kg/m²          Physical Exam  Vitals and nursing note reviewed. Constitutional:       General: She is not in acute distress. Appearance: Normal appearance. HENT:      Head: Normocephalic and atraumatic. Pulmonary:      Effort: Pulmonary effort is normal.   Chest:   Breasts:     Breasts are symmetrical.      Right: Normal. No mass, nipple discharge, skin change or tenderness. Left: Normal. No mass, nipple discharge, skin change or tenderness. Abdominal:      General: There is no distension. Palpations: Abdomen is soft. Tenderness: There is no abdominal tenderness. There is no guarding or rebound. Genitourinary:     General: Normal vulva. Exam position: Lithotomy position. Labia:         Right: No rash, tenderness, lesion or injury. Left: No rash, tenderness, lesion or injury. Urethra: No prolapse, urethral pain, urethral swelling or urethral lesion. Vagina: Normal. No erythema or lesions. Cervix: No cervical motion tenderness, discharge, lesion or cervical bleeding. Uterus: Normal.       Adnexa: Right adnexa normal and left adnexa normal.        Right: No mass or tenderness. Left: No mass or tenderness. Rectum: No mass or external hemorrhoid. Comments: PAP from cervix  Musculoskeletal:         General: Normal range of motion. Lymphadenopathy:      Upper Body:      Right upper body: No axillary adenopathy. Left upper body: No axillary adenopathy. Lower Body: No right inguinal adenopathy. No left inguinal adenopathy. Skin:     General: Skin is warm and dry. Neurological:      Mental Status: She is alert and oriented to person, place, and time. Psychiatric:         Mood and Affect: Mood normal.         Behavior: Behavior normal.         Thought Content:  Thought content normal.         Judgment: Judgment normal.

## 2023-10-13 LAB
LAB AP GYN PRIMARY INTERPRETATION: NORMAL
Lab: NORMAL

## 2023-10-18 NOTE — TELEPHONE ENCOUNTER
[facility] to [facility]/New Patient Appointment  English

## 2023-10-27 ENCOUNTER — CLINICAL SUPPORT (OUTPATIENT)
Dept: FAMILY MEDICINE CLINIC | Facility: HOSPITAL | Age: 28
End: 2023-10-27
Payer: COMMERCIAL

## 2023-10-27 DIAGNOSIS — Z11.1 ENCOUNTER FOR PPD TEST: Primary | ICD-10-CM

## 2023-10-27 PROCEDURE — 86580 TB INTRADERMAL TEST: CPT

## 2023-10-30 ENCOUNTER — CLINICAL SUPPORT (OUTPATIENT)
Dept: FAMILY MEDICINE CLINIC | Facility: HOSPITAL | Age: 28
End: 2023-10-30

## 2023-10-30 DIAGNOSIS — Z11.1 ENCOUNTER FOR PPD SKIN TEST READING: Primary | ICD-10-CM

## 2023-10-30 LAB
INDURATION: 0 MM
TB SKIN TEST: NEGATIVE

## 2023-10-30 PROCEDURE — 99024 POSTOP FOLLOW-UP VISIT: CPT

## 2023-12-29 DIAGNOSIS — G43.109 MIGRAINE WITH AURA AND WITHOUT STATUS MIGRAINOSUS, NOT INTRACTABLE: ICD-10-CM

## 2023-12-29 DIAGNOSIS — E66.01 CLASS 3 SEVERE OBESITY DUE TO EXCESS CALORIES WITHOUT SERIOUS COMORBIDITY WITH BODY MASS INDEX (BMI) OF 50.0 TO 59.9 IN ADULT (HCC): ICD-10-CM

## 2023-12-29 RX ORDER — TOPIRAMATE 50 MG/1
50 TABLET, FILM COATED ORAL EVERY EVENING
Qty: 30 TABLET | Refills: 1 | Status: SHIPPED | OUTPATIENT
Start: 2023-12-29

## 2024-03-02 ENCOUNTER — OFFICE VISIT (OUTPATIENT)
Dept: URGENT CARE | Facility: CLINIC | Age: 29
End: 2024-03-02

## 2024-03-02 VITALS
DIASTOLIC BLOOD PRESSURE: 84 MMHG | SYSTOLIC BLOOD PRESSURE: 120 MMHG | HEART RATE: 85 BPM | BODY MASS INDEX: 45.99 KG/M2 | TEMPERATURE: 98 F | OXYGEN SATURATION: 98 % | HEIGHT: 67 IN | RESPIRATION RATE: 18 BRPM | WEIGHT: 293 LBS

## 2024-03-02 DIAGNOSIS — H65.03 NON-RECURRENT ACUTE SEROUS OTITIS MEDIA OF BOTH EARS: ICD-10-CM

## 2024-03-02 DIAGNOSIS — J06.9 VIRAL URI: Primary | ICD-10-CM

## 2024-03-02 LAB — S PYO AG THROAT QL: NEGATIVE

## 2024-03-02 PROCEDURE — 87880 STREP A ASSAY W/OPTIC: CPT | Performed by: EMERGENCY MEDICINE

## 2024-03-02 PROCEDURE — G0382 LEV 3 HOSP TYPE B ED VISIT: HCPCS | Performed by: EMERGENCY MEDICINE

## 2024-03-02 RX ORDER — PREDNISONE 10 MG/1
TABLET ORAL
Qty: 27 TABLET | Refills: 0 | Status: SHIPPED | OUTPATIENT
Start: 2024-03-02

## 2024-03-02 NOTE — PROGRESS NOTES
[facility]        NAME: Greer Pimentel is a 28 y.o. female  : 1995    MRN: [id number]  DATE: 2024  TIME: 3:38 PM    Assessment and Plan   Viral URI [J06.9]  1. Viral URI        2. Non-recurrent acute serous otitis media of both ears              Patient Instructions     Patient Instructions   You have been diagnosed with a Viral Upper Respiratory infection and your symptoms should resolve over the next 7 to 10 days with the treatments recommended today.  If they do not, it is possible that you have developed a bacterial infection and you should return. If you were to take an antibiotic while you are still in the viral stage, you will not get better any faster, but could kill off the good germs in your body as well as make the germs in you resistant to the antibiotic.  Take an expectorant - guaifenesin should be the only ingredient - during the day, and the cough suppressant (ex. Robitussin DM or Tessalon) if needed at night only.   Take Zinc 25 mg every 12 hours for the next week (the dose is important so do not just take a multivitamin with zinc or an over-the-counter cold med with zinc such as Airborne or Zicam, as that will not give you the sufficient dose).    You should also take Vitamin D3 5000 i.u.s per day for the next 1 week, and Vitamin-C 1 g twice daily (and again dosages are important, do not think you are getting enough vitamin C just by drinking extra orange juice).  You may use Flonase as discussed.  You may also take a decongestant like Sudafed, unless you have hypertension or cardiac disease. Avoid nasal sprays like Afrin or other vasoconstrictors.  If you are diabetic you should adhere strictly to your diabetic diet and monitor blood sugar closely while on prednisone and you should discontinue the prednisone if blood sugar becomes significantly elevated.  Avoid nonsteroidal anti-inflammatories like Advil or Aleve while on prednisone.     Upper Respiratory Infection    AMBULATORY CARE:   An upper respiratory infection  is also called a cold. Your nose, throat, ears, and sinuses may be affected. You are more likely to get a cold in the winter. Your risk of getting a cold may be increased if you smoke cigarettes or have allergies, such as hay fever.  What causes a cold?  A cold is caused by a virus. Many viruses can cause a cold, and each is contagious. This means the virus can be easily spread to another person when the sick person coughs or sneezes. The virus can also be spread if you touch an object the virus is on and then touch your eyes, mouth, or nose.  Cold symptoms  are usually worst for the first 3 to 5 days. You may have any of the following:  Runny or stuffy nose    Sneezing and coughing    Sore throat or hoarseness    Red, watery, and sore eyes    Fatigue (you feel more tired than usual)    Chills and fever    Headache, body aches, or sore muscles    Call your local emergency number (911 in the ) if:   You have chest pain or trouble breathing.      Seek care immediately if:   You have a fever over 102ºF (39ºC).      Call your doctor if:   You have a low fever.    Your sore throat gets worse or you see white or yellow spots in your throat.    Your symptoms get worse after 3 to 5 days or are not better in 14 days.    You have a rash anywhere on your skin.    You have large, tender lumps in your neck.    You have thick, green, or yellow drainage from your nose.    You cough up thick yellow, green, or bloody mucus.    You have a bad earache.    You have questions or concerns about your condition or care.    Treatment:  Colds are caused by viruses and do not get better with antibiotics. Most people get better in 7 to 14 days. You may continue to cough for 2 to 3 weeks. The following may help decrease your symptoms:  Decongestants  help reduce nasal congestion and help you breathe more easily. If you take decongestant pills, they may make you feel restless or not able to  sleep. Do not use decongestant sprays for more than a few days.    Cough suppressants  help reduce coughing. Ask your healthcare provider which type of cough medicine is best for you.     NSAIDs , such as ibuprofen, help decrease swelling, pain, and fever. NSAIDs can cause stomach bleeding or kidney problems in certain people. If you take blood thinner medicine, always ask your healthcare provider if NSAIDs are safe for you. Always read the medicine label and follow directions.    Acetaminophen  decreases pain and fever. It is available without a doctor's order. Ask how much to take and how often to take it. Follow directions. Read the labels of all other medicines you are using to see if they also contain acetaminophen, or ask your doctor or pharmacist. Acetaminophen can cause liver damage if not taken correctly. Do not use more than 4 grams (4,000 milligrams) total of acetaminophen in one day.    Manage a cold:   Rest as much as possible.  Slowly start to do more each day.    Drink more liquids as directed.  Liquids will help thin and loosen mucus so you can cough it up. Liquids will also help prevent dehydration. Liquids that help prevent dehydration include water, fruit juice, and broth. Do not drink liquids that contain caffeine. Caffeine can increase your risk for dehydration. Ask your healthcare provider how much liquid to drink each day.    Soothe a sore throat.  Gargle with warm salt water. Make salt water by dissolving ¼ teaspoon salt in 1 cup warm water. You may also suck on hard candy or throat lozenges. You may use a sore throat spray.    Use a humidifier or vaporizer.  Use a cool mist humidifier or a vaporizer to increase air moisture in your home. This may make it easier for you to breathe and help decrease your cough.    Use saline nasal drops as directed.  These help relieve congestion.    Apply petroleum-based jelly around the outside of your nostrils.  This can decrease irritation from blowing  your nose.    Do not smoke.  Nicotine and other chemicals in cigarettes and cigars can make your symptoms worse. They can also cause infections such as bronchitis or pneumonia. Ask your healthcare provider for information if you currently smoke and need help to quit. E-cigarettes or smokeless tobacco still contain nicotine. Talk to your healthcare provider before you use these products.    Prevent a cold:   Wash your hands often.  Use soap and water every time you wash your hands. Rub your soapy hands together, lacing your fingers. Use the fingers of one hand to scrub under the nails of the other hand. Wash for at least 20 seconds. Rinse with warm, running water for several seconds. Then dry your hands. Use germ-killing gel if soap and water are not available. Do not touch your eyes or mouth without washing your hands first.     Cover a sneeze or cough.  Use a tissue that covers your mouth and nose. Put the used tissue in the trash right away. Use the bend of your arm if a tissue is not available. Wash your hands well with soap and water or use a hand . Do not stand close to anyone who is sneezing or coughing.    Try to stay away from others while you are sick.  This is especially important during the first 2 to 3 days when the virus is more easily spread. Wait until a fever, cough, or other symptoms are gone before you return to work or other regular activities.    Do not share items while you are sick.  This includes food, drinks, eating utensils, and dishes.    Follow up with your doctor as directed:  Write down your questions so you remember to ask them during your visits.  © Copyright [employer] 2022 Information is for End User's use only and may not be sold, redistributed or otherwise used for commercial purposes. All illustrations and images included in CareNotes® are the copyrighted property of [employer] or [employer]  The above information is an  only. It is not  intended as medical advice for individual conditions or treatments. Talk to your doctor, nurse or pharmacist before following any medical regimen to see if it is safe and effective for you.    Fluid In The Ear (Serous Otitis Media)   WHAT YOU NEED TO KNOW:   TRUPTI is fluid trapped in the middle of your ear behind your eardrum. This condition usually develops without signs or symptoms of an ear infection. Serous otitis media may be caused by an upper respiratory infection or allergies. It is most common in the fall and early spring.       DISCHARGE INSTRUCTIONS:   Call your doctor if:   You develop severe ear pain.    The outside of your ear is red or swollen.    You have fluid coming from your ear.    You have questions or concerns about your condition or care.    How to stay healthy:   Wash your hands often throughout the day.  Use soap and water. Rub your soapy hands together, lacing your fingers, for at least 20 seconds. Rinse with warm, running water. Dry your hands with a clean towel or paper towel. Use hand  that contains alcohol if soap and water are not available. Teach children how to wash their hands and use hand .         Avoid people who are sick.  Some germs are easily and quickly spread through contact.    Follow up with your doctor as directed:  Write down your questions so you remember to ask them during your visits.   © Copyright [employer] 2023 Information is for End User's use only and may not be sold, redistributed or otherwise used for commercial purposes.  The above information is an  only. It is not intended as medical advice for individual conditions or treatments. Talk to your doctor, nurse or pharmacist before following any medical regimen to see if it is safe and effective for you.      Follow up with PCP in 3-5 days.  Proceed to  ER if symptoms worsen.    Chief Complaint     Chief Complaint   Patient presents with    Sore Throat     Has had a sore throat for  "\"a couple of weeks now\"  Some days are better some days are worse         History of Present Illness       Patient complains of waxing and waning sore throat,, ear fullness, cough, congestion, shortness of breath, headaches, for the past few weeks.    Sore Throat   This is a new problem. The current episode started 1 to 4 weeks ago. The problem has been waxing and waning. The pain is worse on the left side. There has been no fever. The fever has been present for Less than 1 day. The pain is at a severity of 5/10. Associated symptoms include congestion, coughing, drooling, ear pain, headaches, a hoarse voice, a plugged ear sensation, neck pain, shortness of breath, stridor, swollen glands and trouble swallowing. Pertinent negatives include no abdominal pain, diarrhea, ear discharge or vomiting. She has had exposure to strep. She has had no exposure to mono.       Review of Systems   Review of Systems   Constitutional:  Negative for appetite change, chills, fatigue and fever.   HENT:  Positive for congestion, drooling, ear pain, hoarse voice, rhinorrhea, sinus pressure, sore throat and trouble swallowing. Negative for ear discharge and voice change.    Respiratory:  Positive for cough, shortness of breath and stridor. Negative for chest tightness and wheezing.    Cardiovascular:  Negative for chest pain.   Gastrointestinal:  Negative for abdominal pain, diarrhea, nausea and vomiting.   Musculoskeletal:  Positive for neck pain. Negative for myalgias.   Neurological:  Positive for headaches.         Current Medications       Current Outpatient Medications:     albuterol (Proventil HFA) 90 mcg/act inhaler, Inhale 2 puffs every 6 (six) hours as needed for wheezing, Disp: 6.7 g, Rfl: 5    Fluticasone Furoate-Vilanterol (Breo Ellipta) 100-25 mcg/actuation inhaler, Inhale 1 puff daily Rinse mouth after use., Disp: 3 each, Rfl: 3    levalbuterol (Xopenex HFA) 45 mcg/act inhaler, Inhale 2 puffs every 6 (six) hours as needed " for wheezing Intolerant to albuterol, Disp: 45 g, Rfl: 1    Multiple Vitamins-Minerals (ONE-A-DAY WOMENS PO), Take by mouth, Disp: , Rfl:     ondansetron (ZOFRAN) 4 mg tablet, Take 1 tablet (4 mg total) by mouth every 8 (eight) hours as needed for nausea or vomiting, Disp: 30 tablet, Rfl: 0    rizatriptan (MAXALT) 10 mg tablet, Take 1 tablet (10 mg total) by mouth once as needed for migraine for up to 1 dose may repeat in 2 hours if necessary, Disp: 10 tablet, Rfl: 5    sertraline (ZOLOFT) 25 mg tablet, Take 1 tablet (25 mg total) by mouth daily, Disp: 90 tablet, Rfl: 1    Sodium Fluoride 5000 Sensitive 1.1-5 % GEL, BRUSH ONCE DAILY (DO NOT RINSE FOR 30 MINUTES), Disp: , Rfl:     topiramate (TOPAMAX) 50 MG tablet, TAKE 1 TABLET BY MOUTH EVERY EVENING., Disp: 30 tablet, Rfl: 1    zolpidem (AMBIEN) 5 mg tablet, Take 1 tablet (5 mg total) by mouth daily at bedtime as needed for sleep, Disp: 90 tablet, Rfl: 0    Current Allergies     Allergies as of 03/02/2024    (No Known Allergies)            The following portions of the patient's history were reviewed and updated as appropriate: allergies, current medications, past family history, past medical history, past social history, past surgical history and problem list.     Past Medical History:   Diagnosis Date    ADHD (attention deficit hyperactivity disorder)     Asthma     Attention and concentration deficit     last assessed: Nov 2, 2016    Migraine     Papanicolaou smear 07/23/2020    normal    Status asthmaticus        Past Surgical History:   Procedure Laterality Date    TONSILLECTOMY      TOOTH EXTRACTION         Family History   Problem Relation Age of Onset    Anxiety disorder Father     Anxiety disorder Paternal Grandfather     Thyroid disease Paternal Grandfather     Throat cancer Paternal Grandfather     Cancer Paternal Grandfather     Asthma Mother     No Known Problems Brother     Breast cancer Maternal Grandmother     Diabetes unspecified Maternal  "Grandmother     Cancer Maternal Grandmother     Diabetes Maternal Grandmother     Asthma Maternal Grandfather     No Known Problems Paternal Grandmother     No Known Problems Maternal Aunt     No Known Problems Paternal Aunt     Substance Abuse Neg Hx     Mental illness Neg Hx          Medications have been verified.        Objective   /84   Pulse 85   Temp 98 °F (36.7 °C)   Resp 18   Ht 5' 6.5\" (1.689 m)   Wt 136 kg (300 lb)   LMP  (LMP Unknown)   SpO2 98%   BMI 47.70 kg/m²        Physical Exam     Physical Exam  Vitals and nursing note reviewed.   Constitutional:       General: She is not in acute distress.     Appearance: She is well-developed. She is not ill-appearing or toxic-appearing.   HENT:      Head: Normocephalic and atraumatic.      Right Ear: External ear normal.      Left Ear: External ear normal.      Ears:      Comments: Clear effusions behind both TMs, no erythema of TMs.     Nose: Mucosal edema and congestion present.      Mouth/Throat:      Pharynx: Posterior oropharyngeal erythema present. No oropharyngeal exudate.      Tonsils: No tonsillar abscesses.   Cardiovascular:      Rate and Rhythm: Normal rate and regular rhythm.   Pulmonary:      Effort: Pulmonary effort is normal. No respiratory distress.      Breath sounds: No wheezing or rales.   Musculoskeletal:      Cervical back: Neck supple.   Skin:     General: Skin is warm and dry.   Neurological:      Mental Status: She is alert and oriented to person, place, and time.   Psychiatric:         Mood and Affect: Mood normal.         Behavior: Behavior normal.         Thought Content: Thought content normal.         Judgment: Judgment normal.                   "

## 2024-03-02 NOTE — PATIENT INSTRUCTIONS
You have been diagnosed with a Viral Upper Respiratory infection and your symptoms should resolve over the next 7 to 10 days with the treatments recommended today.  If they do not, it is possible that you have developed a bacterial infection and you should return. If you were to take an antibiotic while you are still in the viral stage, you will not get better any faster, but could kill off the good germs in your body as well as make the germs in you resistant to the antibiotic.  Take an expectorant - guaifenesin should be the only ingredient - during the day, and the cough suppressant (ex. Robitussin DM or Tessalon) if needed at night only.   Take Zinc 25 mg every 12 hours for the next week (the dose is important so do not just take a multivitamin with zinc or an over-the-counter cold med with zinc such as Airborne or Zicam, as that will not give you the sufficient dose).    You should also take Vitamin D3 5000 i.u.s per day for the next 1 week, and Vitamin-C 1 g twice daily (and again dosages are important, do not think you are getting enough vitamin C just by drinking extra orange juice).  You may use Flonase as discussed.  You may also take a decongestant like Sudafed, unless you have hypertension or cardiac disease. Avoid nasal sprays like Afrin or other vasoconstrictors.  If you are diabetic you should adhere strictly to your diabetic diet and monitor blood sugar closely while on prednisone and you should discontinue the prednisone if blood sugar becomes significantly elevated.  Avoid nonsteroidal anti-inflammatories like Advil or Aleve while on prednisone.     Upper Respiratory Infection   AMBULATORY CARE:   An upper respiratory infection  is also called a cold. Your nose, throat, ears, and sinuses may be affected. You are more likely to get a cold in the winter. Your risk of getting a cold may be increased if you smoke cigarettes or have allergies, such as hay fever.  What causes a cold?  A cold is caused by  a virus. Many viruses can cause a cold, and each is contagious. This means the virus can be easily spread to another person when the sick person coughs or sneezes. The virus can also be spread if you touch an object the virus is on and then touch your eyes, mouth, or nose.  Cold symptoms  are usually worst for the first 3 to 5 days. You may have any of the following:  Runny or stuffy nose    Sneezing and coughing    Sore throat or hoarseness    Red, watery, and sore eyes    Fatigue (you feel more tired than usual)    Chills and fever    Headache, body aches, or sore muscles    Call your local emergency number (911 in the [place]) if:   You have chest pain or trouble breathing.      Seek care immediately if:   You have a fever over 102ºF (39ºC).      Call your doctor if:   You have a low fever.    Your sore throat gets worse or you see white or yellow spots in your throat.    Your symptoms get worse after 3 to 5 days or are not better in 14 days.    You have a rash anywhere on your skin.    You have large, tender lumps in your neck.    You have thick, green, or yellow drainage from your nose.    You cough up thick yellow, green, or bloody mucus.    You have a bad earache.    You have questions or concerns about your condition or care.    Treatment:  Colds are caused by viruses and do not get better with antibiotics. Most people get better in 7 to 14 days. You may continue to cough for 2 to 3 weeks. The following may help decrease your symptoms:  Decongestants  help reduce nasal congestion and help you breathe more easily. If you take decongestant pills, they may make you feel restless or not able to sleep. Do not use decongestant sprays for more than a few days.    Cough suppressants  help reduce coughing. Ask your healthcare provider which type of cough medicine is best for you.     NSAIDs , such as ibuprofen, help decrease swelling, pain, and fever. NSAIDs can cause stomach bleeding or kidney problems in certain people.  If you take blood thinner medicine, always ask your healthcare provider if NSAIDs are safe for you. Always read the medicine label and follow directions.    Acetaminophen  decreases pain and fever. It is available without a doctor's order. Ask how much to take and how often to take it. Follow directions. Read the labels of all other medicines you are using to see if they also contain acetaminophen, or ask your doctor or pharmacist. Acetaminophen can cause liver damage if not taken correctly. Do not use more than 4 grams (4,000 milligrams) total of acetaminophen in one day.    Manage a cold:   Rest as much as possible.  Slowly start to do more each day.    Drink more liquids as directed.  Liquids will help thin and loosen mucus so you can cough it up. Liquids will also help prevent dehydration. Liquids that help prevent dehydration include water, fruit juice, and broth. Do not drink liquids that contain caffeine. Caffeine can increase your risk for dehydration. Ask your healthcare provider how much liquid to drink each day.    Soothe a sore throat.  Gargle with warm salt water. Make salt water by dissolving ¼ teaspoon salt in 1 cup warm water. You may also suck on hard candy or throat lozenges. You may use a sore throat spray.    Use a humidifier or vaporizer.  Use a cool mist humidifier or a vaporizer to increase air moisture in your home. This may make it easier for you to breathe and help decrease your cough.    Use saline nasal drops as directed.  These help relieve congestion.    Apply petroleum-based jelly around the outside of your nostrils.  This can decrease irritation from blowing your nose.    Do not smoke.  Nicotine and other chemicals in cigarettes and cigars can make your symptoms worse. They can also cause infections such as bronchitis or pneumonia. Ask your healthcare provider for information if you currently smoke and need help to quit. E-cigarettes or smokeless tobacco still contain nicotine. Talk  to your healthcare provider before you use these products.    Prevent a cold:   Wash your hands often.  Use soap and water every time you wash your hands. Rub your soapy hands together, lacing your fingers. Use the fingers of one hand to scrub under the nails of the other hand. Wash for at least 20 seconds. Rinse with warm, running water for several seconds. Then dry your hands. Use germ-killing gel if soap and water are not available. Do not touch your eyes or mouth without washing your hands first.     Cover a sneeze or cough.  Use a tissue that covers your mouth and nose. Put the used tissue in the trash right away. Use the bend of your arm if a tissue is not available. Wash your hands well with soap and water or use a hand . Do not stand close to anyone who is sneezing or coughing.    Try to stay away from others while you are sick.  This is especially important during the first 2 to 3 days when the virus is more easily spread. Wait until a fever, cough, or other symptoms are gone before you return to work or other regular activities.    Do not share items while you are sick.  This includes food, drinks, eating utensils, and dishes.    Follow up with your doctor as directed:  Write down your questions so you remember to ask them during your visits.  © Copyright [employer] 2022 Information is for End User's use only and may not be sold, redistributed or otherwise used for commercial purposes. All illustrations and images included in CareNotes® are the copyrighted property of [employer] or [employer]  The above information is an  only. It is not intended as medical advice for individual conditions or treatments. Talk to your doctor, nurse or pharmacist before following any medical regimen to see if it is safe and effective for you.    Fluid In The Ear (Serous Otitis Media)   WHAT YOU NEED TO KNOW:   TRUPTI is fluid trapped in the middle of your ear behind your eardrum. This  condition usually develops without signs or symptoms of an ear infection. Serous otitis media may be caused by an upper respiratory infection or allergies. It is most common in the fall and early spring.       DISCHARGE INSTRUCTIONS:   Call your doctor if:   You develop severe ear pain.    The outside of your ear is red or swollen.    You have fluid coming from your ear.    You have questions or concerns about your condition or care.    How to stay healthy:   Wash your hands often throughout the day.  Use soap and water. Rub your soapy hands together, lacing your fingers, for at least 20 seconds. Rinse with warm, running water. Dry your hands with a clean towel or paper towel. Use hand  that contains alcohol if soap and water are not available. Teach children how to wash their hands and use hand .         Avoid people who are sick.  Some germs are easily and quickly spread through contact.    Follow up with your doctor as directed:  Write down your questions so you remember to ask them during your visits.   © Copyright [employer] 2023 Information is for End User's use only and may not be sold, redistributed or otherwise used for commercial purposes.  The above information is an  only. It is not intended as medical advice for individual conditions or treatments. Talk to your doctor, nurse or pharmacist before following any medical regimen to see if it is safe and effective for you.

## 2024-03-06 ENCOUNTER — OFFICE VISIT (OUTPATIENT)
Dept: BARIATRICS | Facility: CLINIC | Age: 29
End: 2024-03-06

## 2024-03-06 VITALS
RESPIRATION RATE: 16 BRPM | HEIGHT: 65 IN | BODY MASS INDEX: 48.82 KG/M2 | SYSTOLIC BLOOD PRESSURE: 110 MMHG | TEMPERATURE: 98 F | WEIGHT: 293 LBS | HEART RATE: 96 BPM | DIASTOLIC BLOOD PRESSURE: 75 MMHG

## 2024-03-06 DIAGNOSIS — E66.01 CLASS 3 SEVERE OBESITY DUE TO EXCESS CALORIES WITHOUT SERIOUS COMORBIDITY WITH BODY MASS INDEX (BMI) OF 50.0 TO 59.9 IN ADULT (HCC): ICD-10-CM

## 2024-03-06 DIAGNOSIS — G43.109 MIGRAINE WITH AURA AND WITHOUT STATUS MIGRAINOSUS, NOT INTRACTABLE: ICD-10-CM

## 2024-03-06 DIAGNOSIS — G47.33 OSA (OBSTRUCTIVE SLEEP APNEA): ICD-10-CM

## 2024-03-06 DIAGNOSIS — E66.01 MORBID OBESITY WITH BMI OF 45.0-49.9, ADULT (HCC): Primary | ICD-10-CM

## 2024-03-06 PROCEDURE — 99213 OFFICE O/P EST LOW 20 MIN: CPT | Performed by: INTERNAL MEDICINE

## 2024-03-06 RX ORDER — PHENTERMINE HYDROCHLORIDE 15 MG/1
15 CAPSULE ORAL EVERY MORNING
Qty: 30 CAPSULE | Refills: 0 | Status: SHIPPED | OUTPATIENT
Start: 2024-03-06

## 2024-03-06 RX ORDER — TOPIRAMATE 50 MG/1
50 TABLET, FILM COATED ORAL EVERY EVENING
Qty: 30 TABLET | Refills: 1 | Status: SHIPPED | OUTPATIENT
Start: 2024-03-06 | End: 2024-03-06 | Stop reason: SDUPTHER

## 2024-03-06 RX ORDER — TOPIRAMATE 50 MG/1
50 TABLET, FILM COATED ORAL EVERY EVENING
Qty: 90 TABLET | Refills: 1 | Status: SHIPPED | OUTPATIENT
Start: 2024-03-06

## 2024-03-06 NOTE — PATIENT INSTRUCTIONS
Initial weight: 307  Last OV weight: 317  Current weight: 300  Change in weight:  -17    General Recommendations:  Nutrition:  Eat breakfast daily.  Do not skip meals.     Food log (ie.) www.myfitnesspal.com, sparkpeople.com, loseit.com, calorieking.com, etc.    Practice mindful eating.  Be sure to set aside time to eat, eat slowly, and savor your food.    Hydration:    At least 64oz of water daily.  No sugar sweetened beverages.  No juice (eat the fruit instead).    Exercise:  Studies have shown that the ideal exercise goal is somewhere between 150 to 300 minutes of moderate intensity exercise a week.  Start with exercising 10 minutes every other day and gradually increase physical activity with a goal of at least 150 minutes of moderate intensity exercise a week, divided over at least 3 days a week.  An example of this would be exercising 30 minutes a day, 5 days a week.  Resistance training can increase muscle mass and increase our resting metabolic rate.   FULL BODY resistance training is recommended 2-3 times a week.  Do not do this on consecutive days to allow for muscle recovery.    Aim for a bare minimum 5000 steps, even on days you do not exercise.    Monitoring:   Weigh yourself daily.  If this causes undue stress, then just weigh yourself once a week.  Weigh yourself the same time of the day with the same amount of clothing on.  Preferably this should be done after waking up, before you eat, and with no clothing or minimal clothing on.    Specific Goals:  Food log (ie.) www.myfitnesspal.com,sparkpeople.com,loseit.com,calorieking.com,etc.   No sugary beverages. At least 64oz of water daily.  Gradually increase physical activity to a goal of 5 days per week for 30 minutes of MODERATE intensity PLUS 2 days per week of FULL BODY resistance training    START taking phentermine 15mg each morning.  Continue with topiramate 50mg each evening.    Phentermine cannot be taken in those that have a history of coronary  artery disease, vascular disease, uncontrolled hypertension, glaucoma, dysrhythmias, stimulant medication, MAOI use, or history of drug abuse or dependence.   Topiramate may increase the likelihood of certain types of kidney stones.  Caution should be used in those that have a history of kidney stones.   Qsymia (phentermine/topiramate) should not be taken by a woman who is pregnant, may get pregnant, or is breastfeeding.     Nurse visit in 1 month.  Return visit:  3 months

## 2024-03-06 NOTE — PROGRESS NOTES
Assessment/Plan:  Greer was seen today for follow-up.    Diagnoses and all orders for this visit:    Morbid obesity with BMI of 45.0-49.9, adult (Formerly [facility])  -     phentermine 15 MG capsule; Take 1 capsule (15 mg total) by mouth every morning    Migraine with aura and without status migrainosus, not intractable  -     topiramate (TOPAMAX) 50 MG tablet; Take 1 tablet (50 mg total) by mouth every evening    LEE (obstructive sleep apnea)    Class 3 severe obesity due to excess calories without serious comorbidity with body mass index (BMI) of 50.0 to 59.9 in adult (Formerly [facility])  -     topiramate (TOPAMAX) 50 MG tablet; Take 1 tablet (50 mg total) by mouth every evening       Initial weight: 307  Last OV weight: 317  Current weight: 300  Change in weight:  -17    General Recommendations:  Nutrition:  Eat breakfast daily.  Do not skip meals.     Food log (ie.) www.myfitnesspal.com, sparkpeople.com, loseit.com, calorieking.com, etc.    Practice mindful eating.  Be sure to set aside time to eat, eat slowly, and savor your food.    Hydration:    At least 64oz of water daily.  No sugar sweetened beverages.  No juice (eat the fruit instead).    Exercise:  Studies have shown that the ideal exercise goal is somewhere between 150 to 300 minutes of moderate intensity exercise a week.  Start with exercising 10 minutes every other day and gradually increase physical activity with a goal of at least 150 minutes of moderate intensity exercise a week, divided over at least 3 days a week.  An example of this would be exercising 30 minutes a day, 5 days a week.  Resistance training can increase muscle mass and increase our resting metabolic rate.   FULL BODY resistance training is recommended 2-3 times a week.  Do not do this on consecutive days to allow for muscle recovery.    Aim for a bare minimum 5000 steps, even on days you do not exercise.    Monitoring:   Weigh yourself daily.  If this causes undue stress, then just weigh yourself once a  week.  Weigh yourself the same time of the day with the same amount of clothing on.  Preferably this should be done after waking up, before you eat, and with no clothing or minimal clothing on.    Specific Goals:  Food log (ie.) www.[employer] Gofitnesspal.com,Bleachers.com,Kidamomit.com,CrowdStar.com,etc.   No sugary beverages. At least 64oz of water daily.  Gradually increase physical activity to a goal of 5 days per week for 30 minutes of MODERATE intensity PLUS 2 days per week of FULL BODY resistance training    START taking phentermine 15mg each morning.  Continue with topiramate 50mg each evening.    Phentermine cannot be taken in those that have a history of coronary artery disease, vascular disease, uncontrolled hypertension, glaucoma, dysrhythmias, stimulant medication, MAOI use, or history of drug abuse or dependence.   Topiramate may increase the likelihood of certain types of kidney stones.  Caution should be used in those that have a history of kidney stones.   Qsymia (phentermine/topiramate) should not be taken by a woman who is pregnant, may get pregnant, or is breastfeeding.     Nurse visit in 1 month.  Return visit:  3 months    Total time spent reviewing chart, interviewing patient, examining patient, discussing plan, answering all questions, and documentin min.       ______________________________________________________________________        Subjective:   Chief Complaint   Patient presents with    Follow-up     MWM-4m F/u     Patient here to discuss weight associated problems and nutrition goals  HPI: Greer Pimentel  is a 28 y.o. female with history of migraines and excess weight.  Weight loss plan:  [facility] Program.   Most recent notes and records were reviewed.    Wt Readings from Last 10 Encounters:   24 136 kg (300 lb)   24 136 kg (300 lb)   10/05/23 (!) 141 kg (311 lb 9.6 oz)   10/03/23 (!) 142 kg (313 lb)   23 (!) 144 kg (317 lb)   23 (!) 142 kg (314 lb)   23  "(!) 140 kg (307 lb 12.8 oz)   05/30/23 (!) 142 kg (313 lb 6.4 oz)   03/29/23 (!) 142 kg (312 lb 3.2 oz)   09/29/22 (!) 136 kg (300 lb 12.8 oz)     Initial weight: 307  Last OV weight: 317  Current weight: 300  Change in weight:  -17    Topiramate helping control her appetite    Eating breakfast now.   Practicing portion control.  Step counts increased since working at a [employer] 6-8k/d    Hunger/Cravings:  Dining out:  Greatly reduce.  Hydration:  Water 60oz+ a day  Alcohol:  No  Sleep:  6 hours  Weight Monitoring:  No    Patient denies personal and family history of  pancreatitis, thyroid cancer, MEN-2 tumors.  Denies any hx of glaucoma, seizures, kidney stones, gallstones.  Denies Hx of CAD, PAD, palpitations, arrhythmia.   Denies uncontrolled anxiety or depression, suicidal ideation or behavior, insomnia or sleep disturbance.     The following portions of the patient's history were reviewed and updated as appropriate: allergies, current medications, past family history, past medical history, past social history, past surgical history, and problem list.    Review Of Systems:  Review of Systems   Constitutional:  Negative for activity change, appetite change, fatigue and fever.   Respiratory:  Negative for cough and shortness of breath.    Cardiovascular:  Negative for chest pain, palpitations and leg swelling.   Gastrointestinal:  Negative for abdominal pain, constipation, diarrhea, nausea and vomiting.   Endocrine: Negative for cold intolerance and heat intolerance.   Genitourinary:  Negative for difficulty urinating and dysuria.   Musculoskeletal:  Negative for arthralgias, back pain and gait problem.   Skin:  Negative for pallor and rash.   Neurological:  Negative for headaches.   Psychiatric/Behavioral:  Negative for dysphoric mood, sleep disturbance and suicidal ideas (or HI). The patient is not nervous/anxious.        Objective:  /75   Pulse 96   Temp 98 °F (36.7 °C)   Resp 16   Ht 5' 5\" (1.651 m)  "  Wt 136 kg (300 lb)   LMP  (LMP Unknown)   BMI 49.92 kg/m²   Physical Exam  Vitals and nursing note reviewed.   Constitutional:       General: She is not in acute distress.     Appearance: Normal appearance. She is not ill-appearing or diaphoretic.   Eyes:      General: No scleral icterus.  Cardiovascular:      Rate and Rhythm: Normal rate and regular rhythm.      Pulses: Normal pulses.      Heart sounds: No murmur heard.  Pulmonary:      Effort: Pulmonary effort is normal. No respiratory distress.      Breath sounds: Normal breath sounds. No wheezing or rhonchi.   Musculoskeletal:      Cervical back: Neck supple.      Right lower leg: No edema.      Left lower leg: No edema.   Lymphadenopathy:      Cervical: No cervical adenopathy.   Skin:     Capillary Refill: Capillary refill takes less than 2 seconds.   Neurological:      Mental Status: She is alert and oriented to person, place, and time.      Gait: Gait normal.   Psychiatric:         Mood and Affect: Mood normal.         Behavior: Behavior normal.       Labs and Imaging  Recent labs and imaging have been personally reviewed.  Lab Results   Component Value Date    WBC 8.93 09/08/2021    HGB 13.4 09/08/2021    HCT 42.5 09/08/2021    MCV 90 09/08/2021     09/08/2021     Lab Results   Component Value Date     07/23/2015    SODIUM 136 07/06/2023    K 4.0 07/06/2023     07/06/2023    CO2 26 07/06/2023    ANIONGAP 7 07/23/2015    AGAP 5 07/06/2023    BUN 12 07/06/2023    CREATININE 0.67 07/06/2023    GLUF 91 07/06/2023    CALCIUM 9.0 07/06/2023    AST 15 09/08/2021    ALT 24 09/08/2021    ALKPHOS 104 09/08/2021    PROT 6.7 07/23/2015    TP 7.4 09/08/2021    BILITOT 0.7 07/23/2015    TBILI 0.68 09/08/2021    EGFR 122 07/06/2023     Lab Results   Component Value Date    HGBA1C 5.1 07/06/2023     Lab Results   Component Value Date    EGB0CXFGCJCZ 1.670 08/10/2022     Lab Results   Component Value Date    CHOLESTEROL 158 09/08/2021     Lab Results    Component Value Date    HDL 58 09/08/2021     Lab Results   Component Value Date    TRIG 52 09/08/2021     Lab Results   Component Value Date    LDLCALC 90 09/08/2021

## 2024-04-03 ENCOUNTER — CLINICAL SUPPORT (OUTPATIENT)
Dept: BARIATRICS | Facility: CLINIC | Age: 29
End: 2024-04-03

## 2024-04-03 VITALS
BODY MASS INDEX: 45.67 KG/M2 | WEIGHT: 291 LBS | HEIGHT: 67 IN | DIASTOLIC BLOOD PRESSURE: 80 MMHG | SYSTOLIC BLOOD PRESSURE: 105 MMHG | TEMPERATURE: 98.2 F | RESPIRATION RATE: 17 BRPM | HEART RATE: 72 BPM

## 2024-04-03 DIAGNOSIS — E66.01 CLASS 3 SEVERE OBESITY DUE TO EXCESS CALORIES WITHOUT SERIOUS COMORBIDITY WITH BODY MASS INDEX (BMI) OF 50.0 TO 59.9 IN ADULT (HCC): ICD-10-CM

## 2024-04-03 DIAGNOSIS — E66.01 MORBID OBESITY WITH BMI OF 45.0-49.9, ADULT (HCC): ICD-10-CM

## 2024-04-03 DIAGNOSIS — R63.5 ABNORMAL WEIGHT GAIN: Primary | ICD-10-CM

## 2024-04-03 DIAGNOSIS — G43.109 MIGRAINE WITH AURA AND WITHOUT STATUS MIGRAINOSUS, NOT INTRACTABLE: ICD-10-CM

## 2024-04-03 PROCEDURE — RECHECK

## 2024-04-03 RX ORDER — PHENTERMINE HYDROCHLORIDE 15 MG/1
15 CAPSULE ORAL EVERY MORNING
Qty: 30 CAPSULE | Refills: 0 | Status: SHIPPED | OUTPATIENT
Start: 2024-04-03 | End: 2024-04-03 | Stop reason: SDUPTHER

## 2024-04-03 RX ORDER — PHENTERMINE HYDROCHLORIDE 15 MG/1
15 CAPSULE ORAL EVERY MORNING
Qty: 30 CAPSULE | Refills: 0 | Status: SHIPPED | OUTPATIENT
Start: 2024-04-03

## 2024-04-03 NOTE — TELEPHONE ENCOUNTER
Refill must be reviewed and completed by the office or provider. The refill is unable to be approved or denied by the medication management team.    Note: this is not a duplicate. Pt is requesting script to be resent to [facility]

## 2024-04-03 NOTE — PROGRESS NOTES
Patient last visit weight:300lb  Patient current visit weight:291lb    If you are taking phentermine or other oral weight loss medications, are you experiencing any of the following symptoms:  Headache:  No  Blurred Vision:  No  Chest Pain:  No  Palpitations: No  Insomnia: No  SPECIFY ORAL MEDICATION AND DOSAGE: Phentermine 15mg/Topamax 50mg  Only at bedtime her lips feel tingly. Refill as well on the Phentermine sent to [facility]      If you are taking an injectable medication,  are you experiencing any of the following symptoms:  Bloating:   Nausea:  Vomiting:   Constipation:   Diarrhea:  SPECIFY INJECTABLE MEDICATION AND CURRENT DOSAGE:      Vitals:    Is BP less than 100/60? No  Is BP greater than 140/90? No  Is HR greater than 100? No  **If yes to any of the above, have patient relax and repeat in 5-10 minutes**    Repeat values:    Is BP less than 100/60?  Is BP greater than 140/90?  Is HR greater than 100?  **If values remain outside of ranges above, please consult provider for next steps**

## 2024-04-15 DIAGNOSIS — F51.01 PRIMARY INSOMNIA: ICD-10-CM

## 2024-04-15 RX ORDER — SERTRALINE HYDROCHLORIDE 25 MG/1
25 TABLET, FILM COATED ORAL DAILY
Qty: 90 TABLET | Refills: 1 | Status: SHIPPED | OUTPATIENT
Start: 2024-04-15 | End: 2025-04-10

## 2024-06-03 DIAGNOSIS — F51.01 PRIMARY INSOMNIA: ICD-10-CM

## 2024-06-03 DIAGNOSIS — Z00.6 ENCOUNTER FOR EXAMINATION FOR NORMAL COMPARISON OR CONTROL IN CLINICAL RESEARCH PROGRAM: ICD-10-CM

## 2024-06-04 RX ORDER — SERTRALINE HYDROCHLORIDE 25 MG/1
25 TABLET, FILM COATED ORAL DAILY
Qty: 90 TABLET | Refills: 1 | Status: SHIPPED | OUTPATIENT
Start: 2024-06-04 | End: 2025-05-30

## 2024-06-05 DIAGNOSIS — E66.01 MORBID OBESITY WITH BMI OF 45.0-49.9, ADULT (HCC): ICD-10-CM

## 2024-06-06 RX ORDER — PHENTERMINE HYDROCHLORIDE 15 MG/1
15 CAPSULE ORAL EVERY MORNING
Qty: 30 CAPSULE | Refills: 0 | Status: SHIPPED | OUTPATIENT
Start: 2024-06-06

## 2024-06-06 NOTE — TELEPHONE ENCOUNTER
Refill must be reviewed and completed by the office or provider. The refill is unable to be approved or denied by the medication management team.

## 2024-06-19 ENCOUNTER — APPOINTMENT (OUTPATIENT)
Dept: LAB | Facility: HOSPITAL | Age: 29
End: 2024-06-19

## 2024-06-19 DIAGNOSIS — Z00.6 ENCOUNTER FOR EXAMINATION FOR NORMAL COMPARISON OR CONTROL IN CLINICAL RESEARCH PROGRAM: ICD-10-CM

## 2024-06-19 PROCEDURE — 36415 COLL VENOUS BLD VENIPUNCTURE: CPT

## 2024-07-08 LAB
APOB+LDLR+PCSK9 GENE MUT ANL BLD/T: NOT DETECTED
BRCA1+BRCA2 DEL+DUP + FULL MUT ANL BLD/T: NOT DETECTED
MLH1+MSH2+MSH6+PMS2 GN DEL+DUP+FUL M: NOT DETECTED

## 2024-07-09 ENCOUNTER — OFFICE VISIT (OUTPATIENT)
Dept: BARIATRICS | Facility: CLINIC | Age: 29
End: 2024-07-09

## 2024-07-09 VITALS
SYSTOLIC BLOOD PRESSURE: 110 MMHG | DIASTOLIC BLOOD PRESSURE: 70 MMHG | WEIGHT: 284 LBS | RESPIRATION RATE: 17 BRPM | BODY MASS INDEX: 44.57 KG/M2 | HEART RATE: 95 BPM | HEIGHT: 67 IN | TEMPERATURE: 97.3 F

## 2024-07-09 DIAGNOSIS — G47.33 OSA (OBSTRUCTIVE SLEEP APNEA): ICD-10-CM

## 2024-07-09 DIAGNOSIS — G43.109 MIGRAINE WITH AURA AND WITHOUT STATUS MIGRAINOSUS, NOT INTRACTABLE: ICD-10-CM

## 2024-07-09 DIAGNOSIS — E66.01 CLASS 3 SEVERE OBESITY DUE TO EXCESS CALORIES WITHOUT SERIOUS COMORBIDITY WITH BODY MASS INDEX (BMI) OF 50.0 TO 59.9 IN ADULT (HCC): Primary | ICD-10-CM

## 2024-07-09 PROCEDURE — 99213 OFFICE O/P EST LOW 20 MIN: CPT | Performed by: INTERNAL MEDICINE

## 2024-07-09 RX ORDER — PHENTERMINE HYDROCHLORIDE 30 MG/1
30 CAPSULE ORAL EVERY MORNING
Qty: 30 CAPSULE | Refills: 0 | Status: SHIPPED | OUTPATIENT
Start: 2024-07-09

## 2024-07-09 RX ORDER — TOPIRAMATE 50 MG/1
50 TABLET, FILM COATED ORAL 2 TIMES DAILY
Qty: 180 TABLET | Refills: 0 | Status: SHIPPED | OUTPATIENT
Start: 2024-07-09

## 2024-07-09 NOTE — PATIENT INSTRUCTIONS
Increase phentermine to 30mg daily.  Increase topiramate to 25mg (1/2 tablet) in the morning and 50mg in the evening for 6 days.  Then increase to 50mg (1 tablet) twice a day.  Calorie track at least one day a week every week  Nurse visit in 6 weeks.  Follow-up in 4 months.

## 2024-07-09 NOTE — PROGRESS NOTES
Assessment/Plan:  Greer was seen today for follow-up.    Diagnoses and all orders for this visit:    Class 3 severe obesity due to excess calories without serious comorbidity with body mass index (BMI) of 50.0 to 59.9 in adult (HCC)  -     phentermine 30 MG capsule; Take 1 capsule (30 mg total) by mouth every morning  -     topiramate (TOPAMAX) 50 MG tablet; Take 1 tablet (50 mg total) by mouth 2 (two) times a day    LEE (obstructive sleep apnea)    Migraine with aura and without status migrainosus, not intractable  -     topiramate (TOPAMAX) 50 MG tablet; Take 1 tablet (50 mg total) by mouth 2 (two) times a day    Increase phentermine to 30mg daily.  Increase topiramate to 25mg (1/2 tablet) in the morning and 50mg in the evening for 6 days.  Then increase to 50mg (1 tablet) twice a day.  Calorie track at least one day a week every week  Nurse visit in 6 weeks.  Follow-up in 4 months.    Total time spent reviewing chart, interviewing patient, examining patient, discussing plan, answering all questions, and documentin min.       ______________________________________________________________________        Subjective:   Chief Complaint   Patient presents with    Follow-up     MWM-4M F/u;Waist-45.5in     Patient here to discuss weight associated problems and nutrition goals  HPI: Greer Pimentel  is a 28 y.o. female with history of excess weight.  Most recent notes and records were reviewed.    Wt Readings from Last 10 Encounters:   24 129 kg (284 lb)   24 132 kg (291 lb)   24 136 kg (300 lb)   24 136 kg (300 lb)   10/05/23 (!) 141 kg (311 lb 9.6 oz)   10/03/23 (!) 142 kg (313 lb)   23 (!) 144 kg (317 lb)   23 (!) 142 kg (314 lb)   23 (!) 140 kg (307 lb 12.8 oz)   23 (!) 142 kg (313 lb 6.4 oz)     Initial weight: 307  Started on topiramate alone and gained 10 pounds.    Phentermine added and she lost 17 pounds.  Last OV weight: 300  Current weight: 284  Change in  "weight:  -16  Total weight loss on phentermine and topiramate combination. - 33 lbs (10.4%)      Hunger/Cravings:  Reduced  Dining out: once a week   Hydration:  Water 30-60oz  Alcohol:  No  Exercise:  Walks 30 minutes to 2 hours, 2-3 days a week  Sleep:  Sleeps about 7 hours  Weight Monitoring:  No      Patient denies personal and family history of  pancreatitis, thyroid cancer, MEN-2 tumors.  Denies any hx of glaucoma, seizures, kidney stones, gallstones.  Denies Hx of CAD, PAD, palpitations, arrhythmia.   Denies uncontrolled anxiety or depression, suicidal ideation or behavior, insomnia or sleep disturbance.     The following portions of the patient's history were reviewed and updated as appropriate: allergies, current medications, past family history, past medical history, past social history, past surgical history, and problem list.    Review Of Systems:  Review of Systems   Constitutional:  Negative for activity change, appetite change, fatigue and fever.   Respiratory:  Negative for cough and shortness of breath.    Cardiovascular:  Negative for chest pain, palpitations and leg swelling.   Gastrointestinal:  Negative for abdominal pain, constipation, diarrhea, nausea and vomiting.   Endocrine: Negative for cold intolerance and heat intolerance.   Genitourinary:  Negative for difficulty urinating and dysuria.   Musculoskeletal:  Negative for arthralgias, back pain and gait problem.   Skin:  Negative for pallor and rash.   Neurological:  Negative for headaches.   Psychiatric/Behavioral:  Negative for dysphoric mood, sleep disturbance and suicidal ideas (or HI). The patient is not nervous/anxious.        Objective:  /70   Pulse 95   Temp (!) 97.3 °F (36.3 °C)   Resp 17   Ht 5' 6.5\" (1.689 m)   Wt 129 kg (284 lb)   BMI 45.15 kg/m²   Physical Exam  Vitals and nursing note reviewed.   Constitutional:       General: She is not in acute distress.     Appearance: Normal appearance. She is not ill-appearing " or diaphoretic.   Eyes:      General: No scleral icterus.  Cardiovascular:      Rate and Rhythm: Normal rate and regular rhythm.      Pulses: Normal pulses.      Heart sounds: No murmur heard.  Pulmonary:      Effort: Pulmonary effort is normal. No respiratory distress.      Breath sounds: Normal breath sounds. No wheezing or rhonchi.   Musculoskeletal:      Cervical back: Neck supple.      Right lower leg: No edema.      Left lower leg: No edema.   Lymphadenopathy:      Cervical: No cervical adenopathy.   Skin:     Capillary Refill: Capillary refill takes less than 2 seconds.      Findings: No lesion or rash.   Neurological:      Mental Status: She is alert and oriented to person, place, and time.      Gait: Gait normal.   Psychiatric:         Mood and Affect: Mood normal.         Behavior: Behavior normal.       Labs and Imaging  Recent labs and imaging have been personally reviewed.  Lab Results   Component Value Date    WBC 8.93 09/08/2021    HGB 13.4 09/08/2021    HCT 42.5 09/08/2021    MCV 90 09/08/2021     09/08/2021     Lab Results   Component Value Date     07/23/2015    SODIUM 136 07/06/2023    K 4.0 07/06/2023     07/06/2023    CO2 26 07/06/2023    ANIONGAP 7 07/23/2015    AGAP 5 07/06/2023    BUN 12 07/06/2023    CREATININE 0.67 07/06/2023    GLUF 91 07/06/2023    CALCIUM 9.0 07/06/2023    AST 15 09/08/2021    ALT 24 09/08/2021    ALKPHOS 104 09/08/2021    PROT 6.7 07/23/2015    TP 7.4 09/08/2021    BILITOT 0.7 07/23/2015    TBILI 0.68 09/08/2021    EGFR 122 07/06/2023     Lab Results   Component Value Date    HGBA1C 5.1 07/06/2023     Lab Results   Component Value Date    NBI5OSFFWSAB 1.670 08/10/2022     Lab Results   Component Value Date    CHOLESTEROL 158 09/08/2021     Lab Results   Component Value Date    HDL 58 09/08/2021     Lab Results   Component Value Date    TRIG 52 09/08/2021     Lab Results   Component Value Date    LDLCALC 90 09/08/2021

## 2024-08-20 ENCOUNTER — CLINICAL SUPPORT (OUTPATIENT)
Dept: BARIATRICS | Facility: CLINIC | Age: 29
End: 2024-08-20

## 2024-08-20 VITALS
HEIGHT: 67 IN | TEMPERATURE: 97.4 F | RESPIRATION RATE: 17 BRPM | WEIGHT: 275.4 LBS | DIASTOLIC BLOOD PRESSURE: 79 MMHG | HEART RATE: 93 BPM | BODY MASS INDEX: 43.22 KG/M2 | SYSTOLIC BLOOD PRESSURE: 104 MMHG

## 2024-08-20 DIAGNOSIS — R63.5 ABNORMAL WEIGHT GAIN: Primary | ICD-10-CM

## 2024-08-20 PROCEDURE — RECHECK

## 2024-08-20 NOTE — PROGRESS NOTES
Patient last visit weight:284 lb  Patient current visit weight:275.4 lb    If you are taking phentermine or other oral weight loss medications, are you experiencing any of the following symptoms:  Headache:  No  Blurred Vision:  No  Chest Pain:  No  Palpitations: No  Insomnia:  No  SPECIFY ORAL MEDICATION AND DOSAGE: Phentermine 30 mg/ Topamax 50 mg    If you are taking an injectable medication,  are you experiencing any of the following symptoms:  Bloating:   Nausea:  Vomiting:   Constipation:   Diarrhea:  SPECIFY INJECTABLE MEDICATION AND CURRENT DOSAGE:      Vitals:    Is BP less than 100/60? No  Is BP greater than 140/90? NO  Is HR greater than 100? NO  **If yes to any of the above, have patient relax and repeat in 5-10 minutes**    Repeat values:    Is BP less than 100/60?  Is BP greater than 140/90?  Is HR greater than 100?  **If values remain outside of ranges above, please consult provider for next steps**

## 2024-09-09 DIAGNOSIS — E66.01 CLASS 3 SEVERE OBESITY DUE TO EXCESS CALORIES WITHOUT SERIOUS COMORBIDITY WITH BODY MASS INDEX (BMI) OF 50.0 TO 59.9 IN ADULT (HCC): ICD-10-CM

## 2024-09-09 DIAGNOSIS — E66.813 CLASS 3 SEVERE OBESITY DUE TO EXCESS CALORIES WITHOUT SERIOUS COMORBIDITY WITH BODY MASS INDEX (BMI) OF 50.0 TO 59.9 IN ADULT (HCC): ICD-10-CM

## 2024-09-09 RX ORDER — PHENTERMINE HYDROCHLORIDE 30 MG/1
30 CAPSULE ORAL EVERY MORNING
Qty: 30 CAPSULE | Refills: 0 | Status: CANCELLED | OUTPATIENT
Start: 2024-09-09

## 2024-09-11 ENCOUNTER — TELEPHONE (OUTPATIENT)
Dept: BARIATRICS | Facility: CLINIC | Age: 29
End: 2024-09-11

## 2024-09-11 NOTE — TELEPHONE ENCOUNTER
Good Day,    Spoke with patient.  She took her last Phentermine 30mg capsule this morning.    She has no more medication.  She has no Chest Pain, Heart Palpitations, Vision issues, headache or sleep disturbance.      She has a MWM visit with Shivani Leblanc PA-C, on December 30, 2024.    Any refills should be sent to:   [id number]  [address]   Phone: [phone]      Thank you.

## 2024-09-11 NOTE — TELEPHONE ENCOUNTER
Patient is not scheduled a follow-up visit at [place] but at [place].  Please see if this could be addressed by an earlier visit at their office or first available at any location.

## 2024-09-12 DIAGNOSIS — E66.01 CLASS 3 SEVERE OBESITY DUE TO EXCESS CALORIES WITHOUT SERIOUS COMORBIDITY WITH BODY MASS INDEX (BMI) OF 50.0 TO 59.9 IN ADULT (HCC): ICD-10-CM

## 2024-09-12 RX ORDER — PHENTERMINE HYDROCHLORIDE 30 MG/1
30 CAPSULE ORAL EVERY MORNING
Qty: 30 CAPSULE | Refills: 2 | Status: SHIPPED | OUTPATIENT
Start: 2024-09-12

## 2024-09-13 NOTE — TELEPHONE ENCOUNTER
Please enquire if the patient got her phentermine refilled. If she cannot be seen earlier than December in [place], she can be scheduled with me. Thanks

## 2024-09-13 NOTE — TELEPHONE ENCOUNTER
I spoke with pt the Rx was sent yesterday, I asked her about moving her appt up and seeing you, but [facility] is to far for the pt.

## 2024-11-30 DIAGNOSIS — F51.01 PRIMARY INSOMNIA: ICD-10-CM

## 2024-12-03 RX ORDER — SERTRALINE HYDROCHLORIDE 25 MG/1
25 TABLET, FILM COATED ORAL DAILY
Qty: 90 TABLET | Refills: 1 | Status: SHIPPED | OUTPATIENT
Start: 2024-12-03

## 2024-12-27 NOTE — PROGRESS NOTES
Assessment & Plan  Obesity, Class III  Refilled phentermine 30 mg and Topamax 50 twice daily.  Instructed patient to look out for common side effects such as hypertension    Current weight: 282    Recommend practicing mindful eating and drinking with frequent meals/snacks as it can potentially improve metabolism and allow for better portion control by decreasing Ghrelin (hunger hormone).      Recommend to time your carb consumption: Minimizing meals high in complex carbs close to bedtime, as they can interfere with sleep as well as potentially negatively impact your metabolism and promote weight gain. Consistently with protein intake throughout the day can help with satiety, muscle repair/growth    Recommend adequate hydration to at least half your body weight in ounces unless medically contraindicated (ie. Systolic heart failure) to help control cravings as well as nutritional support. Fluid requirements will need to be increased as it is important to consider GI losses, physical activity and medication induced fluid loss if applicable. Incorporating resistance training and/or strength training is necessary to help improve metabolic rate if able to tolerate.      Anxiety and depression  Continue Zoloft  Insomnia  Continue with Ambien  ADD (attention deficit disorder)  Previously was on Ritalin, currently not needing any medications             Return in about 3 months (around 3/30/2025) for followup .     Total time spent reviewing chart, interviewing patient, examining patient, discussing plan, answering all questions, and documentin min. Most recent notes and records were reviewed.       ______________________________________________________________________        Subjective:     Chief Complaint   Patient presents with    Follow-up     MWM F/u; Waist 45in       HPI: Greer Pimentel  is a 29 y.o. female with past medical history of sleep apnea, ADD, insomnia, migraines, class III obesity, LEE, anxiety  "depression presents to the clinic for follow-up.  Patient states she was doing well with Topamax and phentermine and dropped down to 270s but was not able to get the medication refilled since September.  She would like to continue on the current regimen.    Current Medication: was on phentermine 30 mg and Topamax 50 twice daily  Medication Side effects:  none  Weight loss plan:  Conservative Program.       Dietary regimen: greek yogurt bars, chicken and rice, chicken fish     -> Skip Breakfast and lunch   Fluid intake: 30 oz one day   Exercise: walking couple times   Occupational:         Review Of Systems:  Constitutional:  Negative for diaphoresis.   Gastrointestinal:  Negative for abdominal pain, and vomiting.   Skin:  Negative for pallor.   Psychiatric/Behavioral:  Negative for behavioral problems, confusion, dysphoric mood and hallucinations.    All other systems reviewed and are negative.     Objective:  /82 (BP Location: Left arm, Patient Position: Sitting)   Pulse 97   Temp 98.1 °F (36.7 °C) (Tympanic)   Resp 15   Ht 5' 6.5\" (1.689 m)   Wt 128 kg (282 lb 12.8 oz)   BMI 44.96 kg/m²     Wt Readings from Last 10 Encounters:   12/30/24 128 kg (282 lb 12.8 oz)   08/20/24 125 kg (275 lb 6.4 oz)   07/09/24 129 kg (284 lb)   04/03/24 132 kg (291 lb)   03/06/24 136 kg (300 lb)   03/02/24 136 kg (300 lb)   10/05/23 (!) 141 kg (311 lb 9.6 oz)   10/03/23 (!) 142 kg (313 lb)   09/26/23 (!) 144 kg (317 lb)   07/05/23 (!) 142 kg (314 lb)       Physical Exam  Constitutional:       General: No acute distress.  Well-nourished  HENT:      Head: Normocephalic and atraumatic.   Eyes:      Extraocular Movements: Extraocular movements intact.      Conjunctiva/sclera: Conjunctivae normal.      Pupils: Pupils are equal, round, and reactive to light.   Cardiovascular:      Rate and Rhythm: Normal rate.   Pulmonary:      Effort: Pulmonary effort is normal.   Neurological:      General: No focal deficit " present.  AO x 3     Mental Status: Alert and oriented to person, place, and time. Mental status is at baseline.   Psychiatric:         Mood and Affect: Mood normal.         Behavior: Behavior normal.     Labs and Imaging  Recent labs and imaging have been personally reviewed.    Lab Results   Component Value Date    WBC 8.93 09/08/2021    HGB 13.4 09/08/2021    HCT 42.5 09/08/2021    MCV 90 09/08/2021     09/08/2021       Lab Results   Component Value Date     07/23/2015    SODIUM 136 07/06/2023    K 4.0 07/06/2023     07/06/2023    CO2 26 07/06/2023    ANIONGAP 7 07/23/2015    AGAP 5 07/06/2023    BUN 12 07/06/2023    CREATININE 0.67 07/06/2023    GLUF 91 07/06/2023    CALCIUM 9.0 07/06/2023    AST 15 09/08/2021    ALT 24 09/08/2021    ALKPHOS 104 09/08/2021    PROT 6.7 07/23/2015    TP 7.4 09/08/2021    BILITOT 0.7 07/23/2015    TBILI 0.68 09/08/2021    EGFR 122 07/06/2023       Lab Results   Component Value Date    HGBA1C 5.1 07/06/2023       Lab Results   Component Value Date    RWH0SXUHFKIZ 1.670 08/10/2022       Lab Results   Component Value Date    CHOLESTEROL 158 09/08/2021       Lab Results   Component Value Date    HDL 58 09/08/2021       Lab Results   Component Value Date    TRIG 52 09/08/2021       Lab Results   Component Value Date    LDLCALC 90 09/08/2021

## 2024-12-30 ENCOUNTER — OFFICE VISIT (OUTPATIENT)
Dept: BARIATRICS | Facility: CLINIC | Age: 29
End: 2024-12-30

## 2024-12-30 VITALS
SYSTOLIC BLOOD PRESSURE: 122 MMHG | RESPIRATION RATE: 15 BRPM | WEIGHT: 282.8 LBS | BODY MASS INDEX: 44.39 KG/M2 | HEIGHT: 67 IN | DIASTOLIC BLOOD PRESSURE: 82 MMHG | HEART RATE: 97 BPM | TEMPERATURE: 98.1 F

## 2024-12-30 DIAGNOSIS — E66.01 CLASS 3 SEVERE OBESITY DUE TO EXCESS CALORIES WITHOUT SERIOUS COMORBIDITY WITH BODY MASS INDEX (BMI) OF 50.0 TO 59.9 IN ADULT (HCC): ICD-10-CM

## 2024-12-30 DIAGNOSIS — E66.813 CLASS 3 SEVERE OBESITY DUE TO EXCESS CALORIES WITHOUT SERIOUS COMORBIDITY WITH BODY MASS INDEX (BMI) OF 50.0 TO 59.9 IN ADULT (HCC): ICD-10-CM

## 2024-12-30 DIAGNOSIS — F32.A ANXIETY AND DEPRESSION: ICD-10-CM

## 2024-12-30 DIAGNOSIS — E66.01 OBESITY, CLASS III, BMI 40-49.9 (MORBID OBESITY) (HCC): Primary | ICD-10-CM

## 2024-12-30 DIAGNOSIS — F98.8 ADD (ATTENTION DEFICIT DISORDER): ICD-10-CM

## 2024-12-30 DIAGNOSIS — G47.00 INSOMNIA: ICD-10-CM

## 2024-12-30 DIAGNOSIS — G47.30 SLEEP APNEA, UNSPECIFIED TYPE: ICD-10-CM

## 2024-12-30 DIAGNOSIS — G43.109 MIGRAINE WITH AURA AND WITHOUT STATUS MIGRAINOSUS, NOT INTRACTABLE: ICD-10-CM

## 2024-12-30 DIAGNOSIS — F41.9 ANXIETY AND DEPRESSION: ICD-10-CM

## 2024-12-30 PROCEDURE — 99213 OFFICE O/P EST LOW 20 MIN: CPT | Performed by: STUDENT IN AN ORGANIZED HEALTH CARE EDUCATION/TRAINING PROGRAM

## 2024-12-30 RX ORDER — PHENTERMINE HYDROCHLORIDE 30 MG/1
30 CAPSULE ORAL EVERY MORNING
Qty: 30 CAPSULE | Refills: 3 | Status: SHIPPED | OUTPATIENT
Start: 2024-12-30

## 2024-12-30 RX ORDER — TOPIRAMATE 50 MG/1
50 TABLET, FILM COATED ORAL 2 TIMES DAILY
Qty: 180 TABLET | Refills: 2 | Status: SHIPPED | OUTPATIENT
Start: 2024-12-30

## 2025-01-10 ENCOUNTER — OFFICE VISIT (OUTPATIENT)
Dept: URGENT CARE | Facility: CLINIC | Age: 30
End: 2025-01-10
Payer: COMMERCIAL

## 2025-01-10 VITALS
WEIGHT: 280 LBS | RESPIRATION RATE: 20 BRPM | DIASTOLIC BLOOD PRESSURE: 84 MMHG | SYSTOLIC BLOOD PRESSURE: 123 MMHG | HEART RATE: 102 BPM | TEMPERATURE: 98.3 F | OXYGEN SATURATION: 97 % | HEIGHT: 67 IN | BODY MASS INDEX: 43.95 KG/M2

## 2025-01-10 DIAGNOSIS — H66.001 NON-RECURRENT ACUTE SUPPURATIVE OTITIS MEDIA OF RIGHT EAR WITHOUT SPONTANEOUS RUPTURE OF TYMPANIC MEMBRANE: Primary | ICD-10-CM

## 2025-01-10 DIAGNOSIS — J06.9 ACUTE URI: ICD-10-CM

## 2025-01-10 PROCEDURE — G0382 LEV 3 HOSP TYPE B ED VISIT: HCPCS

## 2025-01-10 RX ORDER — BENZONATATE 100 MG/1
100 CAPSULE ORAL 3 TIMES DAILY PRN
Qty: 20 CAPSULE | Refills: 0 | Status: SHIPPED | OUTPATIENT
Start: 2025-01-10

## 2025-01-10 RX ORDER — AMOXICILLIN 875 MG/1
875 TABLET, COATED ORAL 2 TIMES DAILY
Qty: 14 TABLET | Refills: 0 | Status: SHIPPED | OUTPATIENT
Start: 2025-01-10 | End: 2025-01-17

## 2025-01-10 NOTE — PATIENT INSTRUCTIONS
Medication as prescribed  Vitamin D3 2000 IU daily  Vitamin C 1000mg twice per day  Multivitamin daily  Some studies suggest that Zinc 12.5-15mg every 2 hours while awake x 5 days may shorten the duration cold symptoms by 1-2 days.   Fluids and rest  Nasal saline spray; Afrin if severe congestion (do not use for more than 3 days)  Over the counter cold medication as needed  Ibuprofen for pain/fever  Salt water gargles and chloraseptic spray  Throat Coat Tea  Warm compresses over sinuses  Steam treatment (utilize proper safety precautions when in contact with hot water/steam)  Sinus Rinses (used distilled water per instructions)

## 2025-01-10 NOTE — Clinical Note
January 10, 2025     Patient: Greer Pimentel   YOB: 1995   Date of Visit: 1/10/2025       To Whom it May Concern:    Greer Pimentel was seen in my clinic on 1/10/2025. She {Return to school/sport:[id number]}.    If you have any questions or concerns, please don't hesitate to call.         Sincerely,          Ciro Sabillon PA-C        CC: No Recipients

## 2025-01-10 NOTE — PROGRESS NOTES
[facility]        NAME: Greer Pimentel is a 29 y.o. female  : 1995    MRN: [id number]  DATE: January 10, 2025  TIME: 6:46 PM    Assessment and Plan   Non-recurrent acute suppurative otitis media of right ear without spontaneous rupture of tympanic membrane [H66.001]  1. Non-recurrent acute suppurative otitis media of right ear without spontaneous rupture of tympanic membrane  amoxicillin (AMOXIL) 875 mg tablet      2. Acute URI  benzonatate (TESSALON PERLES) 100 mg capsule            Patient Instructions     Patient Instructions   Medication as prescribed  Vitamin D3 2000 IU daily  Vitamin C 1000mg twice per day  Multivitamin daily  Some studies suggest that Zinc 12.5-15mg every 2 hours while awake x 5 days may shorten the duration cold symptoms by 1-2 days.   Fluids and rest  Nasal saline spray; Afrin if severe congestion (do not use for more than 3 days)  Over the counter cold medication as needed  Ibuprofen for pain/fever  Salt water gargles and chloraseptic spray  Throat Coat Tea  Warm compresses over sinuses  Steam treatment (utilize proper safety precautions when in contact with hot water/steam)  Sinus Rinses (used distilled water per instructions)      Follow up with PCP in 3-5 days.  Proceed to  ER if symptoms worsen.    Chief Complaint     Chief Complaint   Patient presents with    Cold Like Symptoms     Patient presents with nasal congestion, fever, productive cough, and a sore throat. Symptoms present for 1 week. Using Sudafed, Nasal Spray, and Ibuprofen with no relief.          History of Present Illness       29-year-old female presenting with mom for several days of cold-like symptoms.  Patient reports working out of  with several kids out of school sick and she saw previously.  Patient reports using Sudafed, Afrin, and ibuprofen without significant relief.  Patient reports taking Afrin for 5 days.  Patient reports drinking plenty of fluids and able to tolerate foods.   Patient reports right ear pressure but denies any ear pain.        Review of Systems   Review of Systems   Constitutional:  Positive for chills, fatigue and fever. Negative for diaphoresis.   HENT:  Positive for congestion, postnasal drip and sore throat. Negative for ear discharge, ear pain, rhinorrhea and sinus pressure.    Respiratory:  Positive for cough. Negative for choking.    Cardiovascular:  Negative for chest pain and palpitations.   Gastrointestinal:  Negative for diarrhea, nausea and vomiting.   Musculoskeletal:  Negative for arthralgias and myalgias.   Skin:  Negative for color change.   Neurological:  Positive for headaches. Negative for dizziness and light-headedness.         Current Medications       Current Outpatient Medications:     albuterol (Proventil HFA) 90 mcg/act inhaler, Inhale 2 puffs every 6 (six) hours as needed for wheezing, Disp: 6.7 g, Rfl: 5    amoxicillin (AMOXIL) 875 mg tablet, Take 1 tablet (875 mg total) by mouth 2 (two) times a day for 7 days, Disp: 14 tablet, Rfl: 0    benzonatate (TESSALON PERLES) 100 mg capsule, Take 1 capsule (100 mg total) by mouth 3 (three) times a day as needed for cough, Disp: 20 capsule, Rfl: 0    Fluticasone Furoate-Vilanterol (Breo Ellipta) 100-25 mcg/actuation inhaler, Inhale 1 puff daily Rinse mouth after use., Disp: 3 each, Rfl: 3    levalbuterol (Xopenex HFA) 45 mcg/act inhaler, Inhale 2 puffs every 6 (six) hours as needed for wheezing Intolerant to albuterol, Disp: 45 g, Rfl: 1    Multiple Vitamins-Minerals (ONE-A-DAY WOMENS PO), Take by mouth, Disp: , Rfl:     ondansetron (ZOFRAN) 4 mg tablet, Take 1 tablet (4 mg total) by mouth every 8 (eight) hours as needed for nausea or vomiting, Disp: 30 tablet, Rfl: 0    phentermine 30 MG capsule, Take 1 capsule (30 mg total) by mouth every morning, Disp: 30 capsule, Rfl: 3    rizatriptan (MAXALT) 10 mg tablet, Take 1 tablet (10 mg total) by mouth once as needed for migraine for up to 1 dose may repeat in  2 hours if necessary, Disp: 10 tablet, Rfl: 5    sertraline (ZOLOFT) 25 mg tablet, take 1 tablet by mouth daily, Disp: 90 tablet, Rfl: 1    Sodium Fluoride 5000 Sensitive 1.1-5 % GEL, BRUSH ONCE DAILY (DO NOT RINSE FOR 30 MINUTES), Disp: , Rfl:     topiramate (TOPAMAX) 50 MG tablet, Take 1 tablet (50 mg total) by mouth 2 (two) times a day, Disp: 180 tablet, Rfl: 2    zolpidem (AMBIEN) 5 mg tablet, Take 1 tablet (5 mg total) by mouth daily at bedtime as needed for sleep, Disp: 90 tablet, Rfl: 0    predniSONE 10 mg tablet, Take once daily all days pills on this schedule 6- 6- 5- 4- 3- 2- 1 (Patient not taking: Reported on 1/10/2025), Disp: 27 tablet, Rfl: 0    Current Allergies     Allergies as of 01/10/2025    (No Known Allergies)            The following portions of the patient's history were reviewed and updated as appropriate: allergies, current medications, past family history, past medical history, past social history, past surgical history and problem list.     Past Medical History:   Diagnosis Date    ADHD (attention deficit hyperactivity disorder)     Asthma     Attention and concentration deficit     last assessed: Nov 2, 2016    Migraine     Papanicolaou smear 07/23/2020    normal    Status asthmaticus        Past Surgical History:   Procedure Laterality Date    TONSILLECTOMY      TOOTH EXTRACTION         Family History   Problem Relation Age of Onset    Anxiety disorder Father     Anxiety disorder Paternal Grandfather     Thyroid disease Paternal Grandfather     Throat cancer Paternal Grandfather     Cancer Paternal Grandfather     Asthma Mother     No Known Problems Brother     Breast cancer Maternal Grandmother     Diabetes unspecified Maternal Grandmother     Cancer Maternal Grandmother     Diabetes Maternal Grandmother     Asthma Maternal Grandfather     No Known Problems Paternal Grandmother     No Known Problems Maternal Aunt     No Known Problems Paternal Aunt     Substance Abuse Neg Hx     Mental  "illness Neg Hx          Medications have been verified.        Objective   /84   Pulse 102   Temp 98.3 °F (36.8 °C) (Temporal)   Resp 20   Ht 5' 6.5\" (1.689 m)   Wt 127 kg (280 lb)   SpO2 97%   BMI 44.52 kg/m²        Physical Exam     Physical Exam  Vitals and nursing note reviewed.   Constitutional:       General: She is not in acute distress.     Appearance: She is normal weight.   HENT:      Head: Normocephalic and atraumatic.      Right Ear: Ear canal and external ear normal. Tympanic membrane is erythematous and bulging.      Left Ear: Tympanic membrane, ear canal and external ear normal.      Nose: Congestion present.      Mouth/Throat:      Mouth: Mucous membranes are moist.      Pharynx: Oropharynx is clear. No posterior oropharyngeal erythema.      Comments: Bifurcated uvula midline  Eyes:      General:         Right eye: No discharge.         Left eye: No discharge.      Conjunctiva/sclera: Conjunctivae normal.      Pupils: Pupils are equal, round, and reactive to light.   Cardiovascular:      Rate and Rhythm: Regular rhythm. Tachycardia present.      Pulses: Normal pulses.      Heart sounds: Normal heart sounds.   Pulmonary:      Effort: Pulmonary effort is normal.      Breath sounds: Normal breath sounds.   Abdominal:      General: Bowel sounds are normal.      Palpations: Abdomen is soft.      Tenderness: There is no abdominal tenderness.   Lymphadenopathy:      Cervical: No cervical adenopathy.   Skin:     General: Skin is warm and dry.      Capillary Refill: Capillary refill takes less than 2 seconds.   Neurological:      General: No focal deficit present.      Mental Status: She is alert and oriented to person, place, and time.   Psychiatric:         Mood and Affect: Mood normal.         Behavior: Behavior normal.                   "

## 2025-03-12 ENCOUNTER — OFFICE VISIT (OUTPATIENT)
Dept: BARIATRICS | Facility: CLINIC | Age: 30
End: 2025-03-12

## 2025-03-12 VITALS
SYSTOLIC BLOOD PRESSURE: 103 MMHG | HEIGHT: 67 IN | DIASTOLIC BLOOD PRESSURE: 80 MMHG | HEART RATE: 92 BPM | BODY MASS INDEX: 44.1 KG/M2 | TEMPERATURE: 97.8 F | RESPIRATION RATE: 16 BRPM | WEIGHT: 281 LBS

## 2025-03-12 DIAGNOSIS — E66.813 CLASS 3 SEVERE OBESITY DUE TO EXCESS CALORIES WITHOUT SERIOUS COMORBIDITY WITH BODY MASS INDEX (BMI) OF 50.0 TO 59.9 IN ADULT (HCC): ICD-10-CM

## 2025-03-12 DIAGNOSIS — E66.01 CLASS 3 SEVERE OBESITY DUE TO EXCESS CALORIES WITHOUT SERIOUS COMORBIDITY WITH BODY MASS INDEX (BMI) OF 50.0 TO 59.9 IN ADULT (HCC): ICD-10-CM

## 2025-03-12 DIAGNOSIS — G43.109 MIGRAINE WITH AURA AND WITHOUT STATUS MIGRAINOSUS, NOT INTRACTABLE: ICD-10-CM

## 2025-03-12 PROCEDURE — 99214 OFFICE O/P EST MOD 30 MIN: CPT | Performed by: STUDENT IN AN ORGANIZED HEALTH CARE EDUCATION/TRAINING PROGRAM

## 2025-03-12 RX ORDER — TOPIRAMATE 50 MG/1
50 TABLET, FILM COATED ORAL 2 TIMES DAILY
Qty: 180 TABLET | Refills: 2 | Status: SHIPPED | OUTPATIENT
Start: 2025-03-12

## 2025-03-12 RX ORDER — PHENTERMINE HYDROCHLORIDE 37.5 MG/1
37.5 TABLET ORAL DAILY
Qty: 90 TABLET | Refills: 1 | Status: SHIPPED | OUTPATIENT
Start: 2025-03-12

## 2025-03-12 NOTE — PROGRESS NOTES
Assessment & Plan  Class 3 Obesity    Initial weight: 282  Current weight: 281    Medication: Increase phentermine to 37.5, continue with Topamax 50 twice daily    Patient's Dietary modifications: Recommend the patient increase her fluid intake as well as follow-up with a dietitian as well as a behavioral health specialist      Dietary Recommendations:    Recommend to avoid skipping any meals. Meal frequency (3 meals 2-3 snacks in between) can potentially improve metabolism and allow for better portion control by decreasing Ghrelin (hunger hormone). Protein (ie. Chicken, eggs, yogurt, cheese, protein shakes, bars)  throughout the day can help promote satiety and potential to improve metabolism via thermogenesis. Carbohydrates are essential as it is the body's main fuel source for daily activities.      Macronutrients: (Nutrients that the body needs for energy and support vital functions)    Protein is necessary for muscle growth/repair. Increased energy expenditure is used for the processes of breaking down and rebuilding proteins within the body (thermogenesis)   Carbs: Recommend that carbohydrates be consumed mostly during the times you are more active rather than high amounts before bedtime  Fats: Essential vitamins like A, D, and E, protect your organs, support cell growth and contribute to maintaining healthy cholesterol levels      Fluid intake which is at least half your body weight in ounces is necessary to help control cravings (decreasing confusion for appetite vs water deprivation) as the human body is made up of 50-70% of Fluids. If there is a diversion for water alone, would recommend flavored water (example-splash of lemonade or ice tea) to help promote compliance. Fluids include Teas, water, flavored water, seltzer water, coffee, shakes    Metabolism:    Metabolism can also be promoted by meal frequency and increased muscle mass via exercise      Daily Calorie Needs: are individualized and will need  to take into account any fluid losses, increased activity levels which may need to be adjusted daily. Recommended to not skip any meals even if there is a lack of appetite as it can be suppressed by caffeine or any prior heavy meal due to Leptin (satiety hormone).  Calorie and fluid intake will need to be increased if there is any increased activity during the day compared to previous days.  With proper fluid and macronutrient intake throughout the day, portion control and decreased cravings will improve     Weight check: BMI and weight serve as only guidelines as it is not factor in muscle mass, fat, water. Weights can fluctuate depending on fluid shifts vs what foods are consumed prior to checking your weight      Return in about 3 months (around 6/12/2025) for followup with me, followup with the dietitian and behavioral health specialist .     Most recent notes, labs and previous medical records were reviewed.       ______________________________________________________________________        Subjective:     Chief Complaint   Patient presents with    Follow-up     MWM-3M F/u; Waist-45.5in       HPI: 29 y.o. female with  past medical history of sleep apnea, ADD, insomnia, migraines, class III obesity, LEE, anxiety depression presents to the clinic for follow-up.  Patient states she was doing well with Topamax and phentermine and dropped down to 270s but was not able to get the medication refilled since September.    Current Medication: phentermine 30 mg and Topamax 50 twice daily     Previous Regimen:    Breakfast: protein sandwhich, shakes  Lunch: Chicken, fish  Dinner: chicken, fish   Fluids:  Fluid intake: 30x 3     Physical Activity:  Exercise: walking couple times   Occupational:       Review Of Systems:  General: No pallor, no weakness   Pulmonary: Negative for shortness of breath  Chest: negative for chest pain  Gastrointestinal:  Negative for abdominal pain, diarrhea  "  Psychiatric/Behavioral:  Negative for behavioral problems, confusion, dysphoric mood and hallucinations.    All other systems reviewed and are negative.     Objective:  /80   Pulse 92   Temp 97.8 °F (36.6 °C)   Resp 16   Ht 5' 6.5\" (1.689 m)   Wt 127 kg (281 lb)   BMI 44.68 kg/m²     Wt Readings from Last 30 Encounters:   03/12/25 127 kg (281 lb)   01/10/25 127 kg (280 lb)   12/30/24 128 kg (282 lb 12.8 oz)   08/20/24 125 kg (275 lb 6.4 oz)   07/09/24 129 kg (284 lb)   04/03/24 132 kg (291 lb)   03/06/24 136 kg (300 lb)   03/02/24 136 kg (300 lb)   10/05/23 (!) 141 kg (311 lb 9.6 oz)   10/03/23 (!) 142 kg (313 lb)   09/26/23 (!) 144 kg (317 lb)   07/05/23 (!) 142 kg (314 lb)   06/07/23 (!) 140 kg (307 lb 12.8 oz)   05/30/23 (!) 142 kg (313 lb 6.4 oz)   03/29/23 (!) 142 kg (312 lb 3.2 oz)   09/29/22 (!) 136 kg (300 lb 12.8 oz)   08/09/22 (!) 137 kg (302 lb)   03/15/22 135 kg (297 lb)   11/16/21 127 kg (280 lb)   09/17/21 130 kg (287 lb 3.2 oz)   09/08/21 130 kg (285 lb 9.6 oz)   07/29/21 129 kg (285 lb)   06/22/21 128 kg (282 lb 12.8 oz)   05/19/21 127 kg (280 lb 9.6 oz)   04/09/21 127 kg (280 lb 3.2 oz)   03/01/21 127 kg (281 lb)   01/04/21 126 kg (277 lb 12.8 oz)   12/18/20 122 kg (270 lb)   11/27/20 123 kg (270 lb 12.8 oz)   08/17/20 119 kg (263 lb 3.2 oz)       Physical Exam  Constitutional:       General: No acute distress.  Well-nourished  HENT:      Head: Normocephalic and atraumatic.   Eyes:      Extraocular Movements: Extraocular movements intact.      Conjunctiva/pupils: Conjunctivae normal. Pupils are equal, round  Pulmonary:      Effort: Pulmonary effort is normal. No labored breathing   Neurological:      General: No focal deficit present.  AO x 3     Mental Status: Alert and oriented to person, place, and time. Mental status is at baseline.   Psychiatric:         Mood and Affect: Mood normal.         Behavior: Behavior normal.     Labs and Imaging  Recent labs and imaging have been " personally reviewed.

## 2025-03-27 NOTE — PROGRESS NOTES
Patient presents for behavorial health support as MWM patient, current weight ***. Currently on [facility] RX.    Eating behaviors/food choices: ***    Activity/Exercise:  ***    Sleep/Rest:  ***    Mental Health/Wellness:  *** anxiety, ADD, panic disorder     Goals:  ***    Next Appointment:  *** Menu Planning today with EDMUNDO VELAZCO f/u 6/11/25

## 2025-03-31 ENCOUNTER — CLINICAL SUPPORT (OUTPATIENT)
Dept: BARIATRICS | Facility: CLINIC | Age: 30
End: 2025-03-31

## 2025-03-31 VITALS — BODY MASS INDEX: 45.79 KG/M2 | WEIGHT: 288 LBS

## 2025-03-31 DIAGNOSIS — R63.5 ABNORMAL WEIGHT GAIN: Primary | ICD-10-CM

## 2025-03-31 DIAGNOSIS — E66.01 OBESITY, CLASS III, BMI 40-49.9 (MORBID OBESITY) (HCC): Primary | ICD-10-CM

## 2025-03-31 PROCEDURE — RECHECK

## 2025-03-31 PROCEDURE — WGHTBEHAV30

## 2025-03-31 NOTE — PROGRESS NOTES
Provider: Dr Armin Shipley Planning:  [place]  MH: Anxiety, ADD, panic disorder w/ Rx  Rx for weight loss    Works as a teacher.   Goal for weight loss is <200  Prepping her own food.   Some eating out.   Increased water.   Increased steps   Does not cook.   Mother does the cooking.

## 2025-03-31 NOTE — PROGRESS NOTES
Weight Management Medical Nutrition Assessment    Greer is here for menu planning appt and has seen social work prior to visit today. Been consistent with Topamax and phentermine since coming to the practice and switching providers. Currently seeing Dr. Felix. Because of rehire of providers, Greer was not on medication from September to late December. Reports no side effects and is helping you feel hunger during the day and increase thirst.     At this time she is consuming caloric intake that is at maintenance for weight, which will not create weight loss at this time. We will work on reducing overall foods that contain added sugars and fats, and rather increasing lean proteins, fibrous foods and provide her a feeling of fullness and satisfaction.     Goals:   1) Reduce soda to one can per day   2) Add step count whenever possible   3) Continue packing/meal prepping with mom!     Nutrition Prescription  Calories:1176-1466  Protein:85g  Fluid:70    Meal Plan (Sam/Pro/Carb)  Breakfast: 350 sam/25g PRO  Snack: 150 sam/10g PRO  Lunch: 400 sam/30g PRO  Snack: 100 calories - optional  Dinner: 400 sam/30g PRO  Snack: 150 sam/5g PRO      Patient seen by Medical Provider in past 6 months:  yes  Requested to schedule appointment with Medical Provider: Yes      Anthropometric Measurements  Start Weight (#) & Date: 285   Current Weight (#): 280lb  TBW % Change from start weight:-  Ideal Body Weight (#):130  Goal Weight (#):not reported  Highest: 315  Lowest: 175; before going away to college    Weight Loss History  Previous weight loss attempts: Self Created Diets (Portion Control, Healthy Food Choices, etc.)    Food and Nutrition Related History  Wake up: 6   Bed Time:-    Food Recall  Breakfast: protein shakes - Muscle Milk; might be a breakfast sandwich: Croissant and Pancake; egg and cheese and a meat; sometimes will order at [employer] [address] with a coffee  Snack: None   Lunch: leftovers or try to meal prep chicken or  fish (~4-5oz protein); 3/4 cup cooked; might do a premade salad; pot roast of celery peppers - does not finish meal   Snack: None  Dinner: Same; but portion size increases; sometimes pushes past fullness if its really good   Snack: Fruit and cheesecake; chips (prefers salty)     Beverages: water and coffee/tea, Esdras energy drink, 1-2 sodas per day (Dr Pepper or Mountain Dew, 1 12 oz can)   Volume of beverage intake: 90 oz    Weekends: Same  Cravings: salty  Trouble area of day:at home at the end of the day     Frequency of Eating out: every 3 days; sit down restaurant   Food restrictions:none  Cooking: self   Food Shopping: self    Physical Activity Intake  Activity:Walking  Frequency:several times per week  Physical limitations/barriers to exercise: none    Estimated Needs  Energy  Wolfgang Ramos Energy Needs: BMR : 2088   1-2# loss weekly sedentary:  8030-3906             1-2# loss weekly lightly active:8126-4473  Maintenance calories for sedentary activity level: 2479  Protein:72-90      (1.2-1.5g/kg IBW)  Fluid Requirement Calculator   Total Fluid: 71   oz  (Devin-Segar Method)  Free Fluid: 57 oz ([place]-Segar Method - 20%)    Nutrition Diagnosis  Yes;    Excessive energy intake  related to Food and nutrition related knowledge deficit concerning energy intake as evidenced by  BMI >25 for adults       Nutrition Intervention    Nutrition Prescription  Calories:8618-4591  Protein:85g  Fluid:70    Meal Plan (Gabriela/Pro/Carb)  Breakfast: 350 gabriela/25g PRO  Snack: 150 gabriela/10g PRO  Lunch: 400 gabriela/30g PRO  Snack: 100 calories - optional  Dinner: 400 gabriela/30g PRO  Snack: 150 gabriela/5g PRO    Nutrition Education:    Calorie controlled menu  Lean protein food choices  Healthy snack options  Food journaling tips      Nutrition Counseling:  Strategies: meal planning, portion sizes, healthy snack choices, hydration, fiber intake, protein intake, exercise, food journal      Monitoring and Evaluation:  Evaluation  criteria:  Energy Intake  Meet protein needs  Maintain adequate hydration  Monitor weekly weight  Meal planning/preparation  Food journal   Decreased portions at mealtimes and snacks  Physical activity     Barriers to learning:none  Readiness to change: Preparation:  (Getting ready to change)   Comprehension: very good  Expected Compliance: very good

## 2025-04-02 VITALS — WEIGHT: 288 LBS | HEIGHT: 67 IN | BODY MASS INDEX: 45.2 KG/M2

## 2025-05-31 DIAGNOSIS — F51.01 PRIMARY INSOMNIA: ICD-10-CM

## 2025-06-02 RX ORDER — SERTRALINE HYDROCHLORIDE 25 MG/1
25 TABLET, FILM COATED ORAL DAILY
Qty: 90 TABLET | Refills: 1 | Status: SHIPPED | OUTPATIENT
Start: 2025-06-02

## 2025-06-09 ENCOUNTER — CLINICAL SUPPORT (OUTPATIENT)
Dept: BARIATRICS | Facility: CLINIC | Age: 30
End: 2025-06-09

## 2025-06-09 VITALS — BODY MASS INDEX: 45.2 KG/M2 | HEIGHT: 67 IN | WEIGHT: 288 LBS

## 2025-06-09 DIAGNOSIS — R63.5 ABNORMAL WEIGHT GAIN: Primary | ICD-10-CM

## 2025-06-09 PROCEDURE — WMDI30

## 2025-06-09 PROCEDURE — RECHECK

## 2025-06-09 NOTE — PROGRESS NOTES
Weight Management Medical Nutrition Assessment    Greer is here for menu planning appt - today's weight is 288lb. She is doing well, but has stopped taking topamax - daily dosing can be difficult for her to manage and has been trying to navigate weight loss without  medication at this time. She struggles with instant gratification, which if not seen, can result in breaking good food habits. Rather, I want her to find instant gratification on NOT giving up. When she wants to skip the protein shake, SAY YES to having it. When mom doesn't make something for dinner, SAY YES to making to making a nourishing meal for herself. Looking at how instant gratification can work for herself in ways other than the scale. Simply giving herself sharon in that perhaps she just has not found what methods work best for her yet - and that is okay. Behavior change takes time, it takes something that works for the individual.     She will be transitioning to seeing PA for care, and hopes to discuss options in better detail with her wants and needs.     Focus on continuing to increase lean proteins, fibrous foods to her a feeling of fullness and satisfaction.     Goals:   1) Reduce soda to one can per day   2) Add step count whenever possible   3) Continue packing/meal prepping with mom!     Nutrition Prescription  Calories:0618-6717  Protein:85g  Fluid:70    Meal Plan (Sam/Pro/Carb)  Breakfast: 350 sam/25g PRO  Snack: 150 sam/10g PRO  Lunch: 400 sam/30g PRO  Snack: 100 calories - optional  Dinner: 400 sam/30g PRO  Snack: 150 sam/5g PRO      Patient seen by Medical Provider in past 6 months:  yes  Requested to schedule appointment with Medical Provider: Yes      Anthropometric Measurements  Start Weight (#) & Date: 285   Current Weight (#): 288#   TBW % Change from start weight:-  Ideal Body Weight (#):130  Goal Weight (#):not reported  Highest: 315  Lowest: 175; before going away to college    Weight Loss History  Previous weight loss  attempts: Self Created Diets (Portion Control, Healthy Food Choices, etc.)    Food and Nutrition Related History  Wake up: 6   Bed Time:-    Food Recall  Breakfast: protein shakes - Muscle Milk; might be a breakfast sandwich: Croissant and Pancake; egg and cheese and a meat; sometimes will order at [employer] [address] the [address] with a coffee  Snack: None   Lunch: leftovers or try to meal prep chicken or fish (~4-5oz protein); 3/4 cup cooked; might do a premade salad; pot roast of celery peppers - does not finish meal   Snack: None  Dinner: Same; but portion size increases; sometimes pushes past fullness if its really good   Snack: Fruit and cheesecake; chips (prefers salty)     Beverages: water and coffee/tea, Esdras energy drink, 1-2 sodas per day (Dr Pepper or Mountain Dew, 1 12 oz can)   Volume of beverage intake: 90 oz    Weekends: Same  Cravings: salty  Trouble area of day:at home at the end of the day     Frequency of Eating out: every 3 days; sit down restaurant   Food restrictions:none  Cooking: self   Food Shopping: self    Physical Activity Intake  Activity:Walking  Frequency:several times per week  Physical limitations/barriers to exercise: none    Estimated Needs  Energy  [employer] Energy Needs: BMR : 2088   1-2# loss weekly sedentary:  1107-9098             1-2# loss weekly lightly active:4226-1907  Maintenance calories for sedentary activity level: 2479  Protein:72-90      (1.2-1.5g/kg IBW)  Fluid Requirement Calculator   Total Fluid: 71   oz  (Dixie-Segar Method)  Free Fluid: 57 oz (Dixie-Segar Method - 20%)    Nutrition Diagnosis  Yes;    Excessive energy intake  related to Food and nutrition related knowledge deficit concerning energy intake as evidenced by  BMI >25 for adults       Nutrition Intervention    Nutrition Prescription  Calories:5137-9853  Protein:85g  Fluid:70    Meal Plan (Gabriela/Pro/Carb)  Breakfast: 350 gabriela/25g PRO  Snack: 150 gabriela/10g PRO  Lunch: 400 gabriela/30g PRO  Snack: 100 calories -  optional  Dinner: 400 gabriela/30g PRO  Snack: 150 gabriela/5g PRO    Nutrition Education:    Calorie controlled menu  Lean protein food choices  Healthy snack options  Food journaling tips      Nutrition Counseling:  Strategies: meal planning, portion sizes, healthy snack choices, hydration, fiber intake, protein intake, exercise, food journal      Monitoring and Evaluation:  Evaluation criteria:  Energy Intake  Meet protein needs  Maintain adequate hydration  Monitor weekly weight  Meal planning/preparation  Food journal   Decreased portions at mealtimes and snacks  Physical activity     Barriers to learning:none  Readiness to change: Preparation:  (Getting ready to change)   Comprehension: very good  Expected Compliance: very good

## 2025-06-11 ENCOUNTER — OFFICE VISIT (OUTPATIENT)
Dept: BARIATRICS | Facility: CLINIC | Age: 30
End: 2025-06-11

## 2025-06-11 VITALS
SYSTOLIC BLOOD PRESSURE: 100 MMHG | HEIGHT: 67 IN | WEIGHT: 289.8 LBS | HEART RATE: 89 BPM | TEMPERATURE: 97.6 F | DIASTOLIC BLOOD PRESSURE: 63 MMHG | BODY MASS INDEX: 45.48 KG/M2 | RESPIRATION RATE: 16 BRPM

## 2025-06-11 DIAGNOSIS — E66.813 CLASS 3 SEVERE OBESITY DUE TO EXCESS CALORIES WITHOUT SERIOUS COMORBIDITY WITH BODY MASS INDEX (BMI) OF 50.0 TO 59.9 IN ADULT: Primary | ICD-10-CM

## 2025-06-11 PROCEDURE — 99213 OFFICE O/P EST LOW 20 MIN: CPT | Performed by: PHYSICIAN ASSISTANT

## 2025-06-11 RX ORDER — TIRZEPATIDE 2.5 MG/.5ML
2.5 INJECTION, SOLUTION SUBCUTANEOUS WEEKLY
Qty: 2 ML | Refills: 2 | Status: SHIPPED | OUTPATIENT
Start: 2025-06-11

## 2025-06-11 NOTE — PROGRESS NOTES
Assessment & Plan  Class 3 severe obesity due to excess calories without serious comorbidity with body mass index (BMI) of 50.0 to 59.9 in adult  She has stopped taking Phentermine/Topamax a few weeks ago as she did not feel like they were effective any more.  It does look like she has had 8.79% weight loss since initially started treatment with Phentermine/Topamax and although she wasn't losing any more weight, it was likely helpful to maintain her weight loss as she has noticed increased weight/appetite since stopping medication.   She prefers not to resume these medications due to possible side effects and that she hasn't been able to lose more weight and also forgets to take pills daily.   Discussed option of injectable medication including of cash pay medications (no insurance)  Discussed that medication typically needs to be used long term to maintain weight loss so if deciding to start medication she should consider long term cost.   She is interested in Giving Zepbound 2.5mg Vial/syringe a try through [facility]  Discussed expected weight loss of approximately 20% along with lifestyle modifications  Discussed risks/side effects of medication   Advised her to call office if she needs help with vial/syringe. Instructional video sent via [employer]. Her mother is a paramedic and can help with injections.   Recommend small/low fat meals and stop eating when full to avoid side effects.  Discussed importance of adequate protein intake and strength training to reduce risk of muscle loss.   Discussed need to stop medication for at least 1 week prior to planned surgery/endoscopy  Start Zepbound  2.5mg weekly x 4 weeks  Advised to contact office in 2 weeks with update regarding tolerability and at that time will increase to 5mg dose if tolerating medication with no significant side effects.   If she is doing well with 2.5mg dose she can remain on it longer to lower cost of treatment.   Discussed need for contraception   if she becomes sexually active.   Labs normal 2023.   Discussed importance of regular exercise. Recommend  working up to 150 minutes per week including full body strength training 2x per week.     Continue to follow with Yun Wick RD as scheduled in July.              Return in about 3 months (around 9/11/2025).         Subjective:   Chief Complaint   Patient presents with   • Follow-up     MWM-3M F/u; Waist-46in       Patient ID: Greer Pimentel  is a 29 y.o. female with excess weight/obesity here for Weight Management Follow up Visit    HPI: Here for Medical Weight Management Follow Up Visit    Obesity/Excess Weight:  Current BMI: Body mass index is 46.07 kg/m².   Initial Weight: 307  9/2023 started topamax at 317  3/6/38619 started phentermine at 300mg  Last visit Weight: 281 on 3/12/2025  Current Weight: 289    Current Weight Management Plan:   Current Medication:   Has been off Phentermine/topamax for a few weeks.   Didn't feel like medication was helping any more  Medication Side effects:  was having nausea, unsure if medication   Food Logging: not logging, has not space on cell phone for an ludin  Calorie Intake:  trying to remain in the 8676-4979 RD goals.     Reviewed RD notes  Nutrition Prescription  Calories:3937-4485  Protein:85g  Fluid:70    Food Recall:  Breakfast: Protein Shake and breakfast sandwich (barnett/egg/cheese)  Lunch: Some sort of protein with a pasta or rice and veggies   Dinner: Similar to lunch-- slightly bigger  Bedtime Snack: chips or popcorn     Hydration: 1 diet soda per day   2-3 of 32 ounce water bottles per day     Lifestyle History:  Exercise: Walks about 3x per week for 30 mins, some hiking too.   Occupation: Works at     No plans for pregnancy  Not sexually active    Per prior notes-Patient denies personal and family history of pancreatitis, thyroid cancer, MEN-2 tumors.       Wt Readings from Last 20 Encounters:   06/11/25 131 kg (289 lb 12.8 oz)   06/09/25 131 kg (288  "lb)   04/02/25 131 kg (288 lb)   03/31/25 131 kg (288 lb)   03/12/25 127 kg (281 lb)   01/10/25 127 kg (280 lb)   12/30/24 128 kg (282 lb 12.8 oz)   08/20/24 125 kg (275 lb 6.4 oz)   07/09/24 129 kg (284 lb)   04/03/24 132 kg (291 lb)   03/06/24 136 kg (300 lb)   03/02/24 136 kg (300 lb)   10/05/23 (!) 141 kg (311 lb 9.6 oz)   10/03/23 (!) 142 kg (313 lb)   09/26/23 (!) 144 kg (317 lb)   07/05/23 (!) 142 kg (314 lb)   06/07/23 (!) 140 kg (307 lb 12.8 oz)   05/30/23 (!) 142 kg (313 lb 6.4 oz)   03/29/23 (!) 142 kg (312 lb 3.2 oz)   09/29/22 (!) 136 kg (300 lb 12.8 oz)        Review of Systems    Past Medical History[1]    Past Surgical History[2]     No Known Allergies     Objective:    /63   Pulse 89   Temp 97.6 °F (36.4 °C)   Resp 16   Ht 5' 6.5\" (1.689 m)   Wt 131 kg (289 lb 12.8 oz)   BMI 46.07 kg/m²     Physical Exam:  Constitutional:  she  appears well-developed and well-nourished. No distress.   HENT:   Head: Normocephalic and atraumatic.   Neck: Normal range of motion.   Pulmonary/Chest: Effort normal.   Musculoskeletal: Normal range of motion.   Neurological: she  is alert and oriented to person, place, and time.   Skin: she  is not diaphoretic.   Psychiatric: she  has a normal mood and affect. her  behavior is normal.        LABS:    Lab Results   Component Value Date    HGBA1C 5.1 07/06/2023      Lab Results   Component Value Date     07/23/2015    SODIUM 136 07/06/2023    K 4.0 07/06/2023     07/06/2023    CO2 26 07/06/2023    ANIONGAP 7 07/23/2015    AGAP 5 07/06/2023    BUN 12 07/06/2023    CREATININE 0.67 07/06/2023    GLUF 91 07/06/2023    CALCIUM 9.0 07/06/2023    AST 15 09/08/2021    ALT 24 09/08/2021    ALKPHOS 104 09/08/2021    PROT 6.7 07/23/2015    TP 7.4 09/08/2021    BILITOT 0.7 07/23/2015    TBILI 0.68 09/08/2021    EGFR 122 07/06/2023 09/08/2021   Lab Results   Component Value Date    OZS3KMQVEBKA 1.670 08/10/2022             [1]  Past Medical History:  Diagnosis " Date   • ADHD (attention deficit hyperactivity disorder)    • Asthma    • Attention and concentration deficit     last assessed: Nov 2, 2016   • Migraine    • Papanicolaou smear 07/23/2020    normal   • Status asthmaticus    [2]  Past Surgical History:  Procedure Laterality Date   • TONSILLECTOMY     • TOOTH EXTRACTION

## 2025-07-21 ENCOUNTER — CLINICAL SUPPORT (OUTPATIENT)
Dept: BARIATRICS | Facility: CLINIC | Age: 30
End: 2025-07-21

## 2025-07-21 VITALS — BODY MASS INDEX: 44.57 KG/M2 | WEIGHT: 284 LBS | HEIGHT: 67 IN

## 2025-07-21 DIAGNOSIS — R63.5 ABNORMAL WEIGHT GAIN: Primary | ICD-10-CM

## 2025-07-21 PROCEDURE — WMDI30

## 2025-07-21 PROCEDURE — RECHECK

## 2025-07-21 NOTE — PROGRESS NOTES
Weight Management Medical Nutrition Assessment    Greer is here for menu planning appt - today's weight is  284.4, loss of 4# since last appt. She is doing well, and has started taking Zepbound. She has felt its effects two weeks ago, but prior did not. Once she began feeling fullness and less hunger, the weight loss started. She is on target with losing 2# per week based on this information. Overall she is going in the right direction and also has hit her goals she set for herself last appt. Great work!!     Goals:   1) Reduce soda to one can per day   2) Add step count whenever possible   3) Continue packing/meal prepping with mom!     Nutrition Prescription  Calories:5162-9072  Protein:85g  Fluid:70    Meal Plan (Sam/Pro/Carb)  Breakfast: 350 sam/25g PRO  Snack: 150 sam/10g PRO  Lunch: 400 sam/30g PRO  Snack: 100 calories - optional  Dinner: 400 sam/30g PRO  Snack: 150 sam/5g PRO      Patient seen by Medical Provider in past 6 months:  yes  Requested to schedule appointment with Medical Provider: Yes      Anthropometric Measurements  Start Weight (#) & Date: 285   Current Weight (#): 284#   TBW % Change from start weight:-  Ideal Body Weight (#):130  Goal Weight (#):not reported  Highest: 315  Lowest: 175; before going away to college    Weight Loss History  Previous weight loss attempts: Self Created Diets (Portion Control, Healthy Food Choices, etc.)    Food and Nutrition Related History  Wake up: 6   Bed Time:-    Food Recall  Breakfast: protein shakes - Muscle Milk; might be a breakfast sandwich: Croissant and Pancake; egg and cheese and a meat; sometimes will order at [facility] on the way with a coffee  Snack: None   Lunch: leftovers or try to meal prep chicken or fish (~4-5oz protein); 3/4 cup cooked; might do a premade salad; pot roast of celery peppers - does not finish meal   Snack: None  Dinner: Same; but portion size increases; sometimes pushes past fullness if its really good   Snack: Fruit and  cheesecake; chips (prefers salty)     Beverages: water and coffee/tea, Esdras energy drink, 1-2 sodas per day (Dr Pepper or Mountain Dew, 1 12 oz can)   Volume of beverage intake: 90 oz    Weekends: Same  Cravings: salty  Trouble area of day:at home at the end of the day     Frequency of Eating out: every 3 days; sit down restaurant   Food restrictions:none  Cooking: self   Food Shopping: self    Physical Activity Intake  Activity:Walking  Frequency:several times per week  Physical limitations/barriers to exercise: none    Estimated Needs  Energy  Wolfgang Ramos Energy Needs: BMR : 2088   1-2# loss weekly sedentary:  [id number]             1-2# loss weekly lightly active:2230-9633  Maintenance calories for sedentary activity level: 2479  Protein:72-90      (1.2-1.5g/kg IBW)  Fluid Requirement Calculator   Total Fluid: 71   oz  ([place] Method)  Free Fluid: 57 oz ([place] Method - 20%)    Nutrition Diagnosis  Yes;    Excessive energy intake  related to Food and nutrition related knowledge deficit concerning energy intake as evidenced by  BMI >25 for adults       Nutrition Intervention    Nutrition Prescription  Calories:9965-6694  Protein:85g  Fluid:70    Meal Plan (Sam/Pro/Carb)  Breakfast: 350 sam/25g PRO  Snack: 150 sam/10g PRO  Lunch: 400 sam/30g PRO  Snack: 100 calories - optional  Dinner: 400 sam/30g PRO  Snack: 150 sam/5g PRO    Nutrition Education:    Calorie controlled menu  Lean protein food choices  Healthy snack options  Food journaling tips      Nutrition Counseling:  Strategies: meal planning, portion sizes, healthy snack choices, hydration, fiber intake, protein intake, exercise, food journal      Monitoring and Evaluation:  Evaluation criteria:  Energy Intake  Meet protein needs  Maintain adequate hydration  Monitor weekly weight  Meal planning/preparation  Food journal   Decreased portions at mealtimes and snacks  Physical activity     Barriers to learning:none  Readiness to change:  Preparation:  (Getting ready to change)   Comprehension: very good  Expected Compliance: very good

## 2025-08-20 DIAGNOSIS — E66.813 CLASS 3 SEVERE OBESITY DUE TO EXCESS CALORIES WITHOUT SERIOUS COMORBIDITY WITH BODY MASS INDEX (BMI) OF 50.0 TO 59.9 IN ADULT: ICD-10-CM

## 2025-08-21 DIAGNOSIS — E66.813 CLASS 3 SEVERE OBESITY DUE TO EXCESS CALORIES WITHOUT SERIOUS COMORBIDITY WITH BODY MASS INDEX (BMI) OF 50.0 TO 59.9 IN ADULT: Primary | ICD-10-CM

## 2025-08-21 RX ORDER — TIRZEPATIDE 2.5 MG/.5ML
INJECTION, SOLUTION SUBCUTANEOUS
Qty: 2 ML | Refills: 2 | OUTPATIENT
Start: 2025-08-21

## 2025-08-21 RX ORDER — TIRZEPATIDE 5 MG/.5ML
5 INJECTION, SOLUTION SUBCUTANEOUS WEEKLY
Qty: 2 ML | Refills: 2 | Status: SHIPPED | OUTPATIENT
Start: 2025-08-21